# Patient Record
Sex: MALE | Race: WHITE | Employment: OTHER | URBAN - METROPOLITAN AREA
[De-identification: names, ages, dates, MRNs, and addresses within clinical notes are randomized per-mention and may not be internally consistent; named-entity substitution may affect disease eponyms.]

---

## 2018-03-26 ENCOUNTER — HOSPITAL ENCOUNTER (OUTPATIENT)
Age: 78
Setting detail: THERAPIES SERIES
Discharge: HOME | End: 2018-03-26
Payer: MEDICARE

## 2018-04-03 ENCOUNTER — HOSPITAL ENCOUNTER (OUTPATIENT)
Age: 78
Setting detail: THERAPIES SERIES
Discharge: HOME | End: 2018-04-03

## 2018-04-03 PROCEDURE — 9900000038 HC CARDIAC REHAB PHASE 3 - MONTHLY

## 2018-04-08 ENCOUNTER — APPOINTMENT (OUTPATIENT)
Dept: GENERAL RADIOLOGY | Age: 78
End: 2018-04-08
Payer: MEDICARE

## 2018-04-08 ENCOUNTER — HOSPITAL ENCOUNTER (EMERGENCY)
Age: 78
Discharge: HOME | End: 2018-04-08
Attending: EMERGENCY MEDICINE
Payer: MEDICARE

## 2018-04-08 VITALS
WEIGHT: 167 LBS | DIASTOLIC BLOOD PRESSURE: 63 MMHG | HEIGHT: 67 IN | SYSTOLIC BLOOD PRESSURE: 116 MMHG | OXYGEN SATURATION: 94 % | HEART RATE: 93 BPM | BODY MASS INDEX: 26.21 KG/M2 | RESPIRATION RATE: 22 BRPM | TEMPERATURE: 98.1 F

## 2018-04-08 DIAGNOSIS — J11.1 INFLUENZA WITH RESPIRATORY MANIFESTATION OTHER THAN PNEUMONIA: Primary | ICD-10-CM

## 2018-04-08 LAB
ALBUMIN SERPL-MCNC: 4.2 G/DL (ref 3.5–5.2)
ALP BLD-CCNC: 36 U/L (ref 40–129)
ALT SERPL-CCNC: 51 U/L (ref 0–40)
ANION GAP SERPL CALCULATED.3IONS-SCNC: 14 MMOL/L (ref 7–16)
AST SERPL-CCNC: 57 U/L (ref 0–39)
BASOPHILS ABSOLUTE: 0 E9/L (ref 0–0.2)
BASOPHILS RELATIVE PERCENT: 0 % (ref 0–2)
BILIRUB SERPL-MCNC: 0.5 MG/DL (ref 0–1.2)
BILIRUBIN URINE: NEGATIVE
BLOOD, URINE: NEGATIVE
BUN BLDV-MCNC: 21 MG/DL (ref 8–23)
CALCIUM SERPL-MCNC: 8.6 MG/DL (ref 8.6–10.2)
CHLORIDE BLD-SCNC: 100 MMOL/L (ref 98–107)
CLARITY: CLEAR
CO2: 21 MMOL/L (ref 22–29)
COLOR: YELLOW
CREAT SERPL-MCNC: 1.2 MG/DL (ref 0.7–1.2)
EOSINOPHILS ABSOLUTE: 0.01 E9/L (ref 0.05–0.5)
EOSINOPHILS RELATIVE PERCENT: 0.2 % (ref 0–6)
GFR AFRICAN AMERICAN: >60
GFR NON-AFRICAN AMERICAN: 59 ML/MIN/1.73
GLUCOSE BLD-MCNC: 176 MG/DL (ref 74–109)
GLUCOSE URINE: NEGATIVE MG/DL
HCT VFR BLD CALC: 42.2 % (ref 37–54)
HEMOGLOBIN: 13.8 G/DL (ref 12.5–16.5)
IMMATURE GRANULOCYTES #: 0.01 E9/L
IMMATURE GRANULOCYTES %: 0.2 % (ref 0–5)
INFLUENZA A BY PCR: DETECTED
INFLUENZA B BY PCR: NOT DETECTED
KETONES, URINE: NEGATIVE MG/DL
LACTIC ACID: 1.6 MMOL/L (ref 0.5–2.2)
LEUKOCYTE ESTERASE, URINE: NEGATIVE
LIPASE: 292 U/L (ref 13–60)
LYMPHOCYTES ABSOLUTE: 1.23 E9/L (ref 1.5–4)
LYMPHOCYTES RELATIVE PERCENT: 27 % (ref 20–42)
MCH RBC QN AUTO: 29.9 PG (ref 26–35)
MCHC RBC AUTO-ENTMCNC: 32.7 % (ref 32–34.5)
MCV RBC AUTO: 91.3 FL (ref 80–99.9)
MONOCYTES ABSOLUTE: 0.75 E9/L (ref 0.1–0.95)
MONOCYTES RELATIVE PERCENT: 16.5 % (ref 2–12)
NEUTROPHILS ABSOLUTE: 2.55 E9/L (ref 1.8–7.3)
NEUTROPHILS RELATIVE PERCENT: 56.1 % (ref 43–80)
NITRITE, URINE: NEGATIVE
PDW BLD-RTO: 14 FL (ref 11.5–15)
PH UA: 5 (ref 5–9)
PLATELET # BLD: 216 E9/L (ref 130–450)
PMV BLD AUTO: 9.5 FL (ref 7–12)
POTASSIUM SERPL-SCNC: 3.7 MMOL/L (ref 3.5–5)
PROTEIN UA: NEGATIVE MG/DL
RBC # BLD: 4.62 E12/L (ref 3.8–5.8)
SODIUM BLD-SCNC: 135 MMOL/L (ref 132–146)
SPECIFIC GRAVITY UA: >=1.03 (ref 1–1.03)
TOTAL PROTEIN: 6.7 G/DL (ref 6.4–8.3)
UROBILINOGEN, URINE: 0.2 E.U./DL
WBC # BLD: 4.6 E9/L (ref 4.5–11.5)

## 2018-04-08 PROCEDURE — 87040 BLOOD CULTURE FOR BACTERIA: CPT

## 2018-04-08 PROCEDURE — 94664 DEMO&/EVAL PT USE INHALER: CPT

## 2018-04-08 PROCEDURE — 81003 URINALYSIS AUTO W/O SCOPE: CPT

## 2018-04-08 PROCEDURE — 87088 URINE BACTERIA CULTURE: CPT

## 2018-04-08 PROCEDURE — 99285 EMERGENCY DEPT VISIT HI MDM: CPT

## 2018-04-08 PROCEDURE — 80053 COMPREHEN METABOLIC PANEL: CPT

## 2018-04-08 PROCEDURE — 83605 ASSAY OF LACTIC ACID: CPT

## 2018-04-08 PROCEDURE — 87502 INFLUENZA DNA AMP PROBE: CPT

## 2018-04-08 PROCEDURE — 71046 X-RAY EXAM CHEST 2 VIEWS: CPT

## 2018-04-08 PROCEDURE — 83690 ASSAY OF LIPASE: CPT

## 2018-04-08 PROCEDURE — 6370000000 HC RX 637 (ALT 250 FOR IP): Performed by: EMERGENCY MEDICINE

## 2018-04-08 PROCEDURE — 85025 COMPLETE CBC W/AUTO DIFF WBC: CPT

## 2018-04-08 RX ORDER — IPRATROPIUM BROMIDE AND ALBUTEROL SULFATE 2.5; .5 MG/3ML; MG/3ML
1 SOLUTION RESPIRATORY (INHALATION) ONCE
Status: COMPLETED | OUTPATIENT
Start: 2018-04-08 | End: 2018-04-08

## 2018-04-08 RX ORDER — OSELTAMIVIR PHOSPHATE 75 MG/1
75 CAPSULE ORAL 2 TIMES DAILY
Qty: 10 CAPSULE | Refills: 0 | Status: SHIPPED | OUTPATIENT
Start: 2018-04-08 | End: 2018-04-13

## 2018-04-08 RX ADMIN — IPRATROPIUM BROMIDE AND ALBUTEROL SULFATE 1 AMPULE: .5; 3 SOLUTION RESPIRATORY (INHALATION) at 07:41

## 2018-04-08 ASSESSMENT — PAIN DESCRIPTION - LOCATION: LOCATION: ABDOMEN;BACK

## 2018-04-08 ASSESSMENT — PAIN SCALES - GENERAL: PAINLEVEL_OUTOF10: 8

## 2018-04-08 ASSESSMENT — PAIN DESCRIPTION - PAIN TYPE: TYPE: ACUTE PAIN

## 2018-04-10 LAB — URINE CULTURE, ROUTINE: NORMAL

## 2018-04-13 LAB
BLOOD CULTURE, ROUTINE: NORMAL
CULTURE, BLOOD 2: NORMAL

## 2018-05-07 ENCOUNTER — HOSPITAL ENCOUNTER (OUTPATIENT)
Age: 78
Discharge: HOME | End: 2018-05-09
Payer: MEDICARE

## 2018-05-07 ENCOUNTER — HOSPITAL ENCOUNTER (OUTPATIENT)
Age: 78
Setting detail: THERAPIES SERIES
Discharge: HOME | End: 2018-05-07

## 2018-05-07 ENCOUNTER — OFFICE VISIT (OUTPATIENT)
Dept: PRIMARY CARE CLINIC | Age: 78
End: 2018-05-07
Payer: MEDICARE

## 2018-05-07 VITALS
HEIGHT: 67 IN | OXYGEN SATURATION: 98 % | HEART RATE: 65 BPM | BODY MASS INDEX: 26.68 KG/M2 | SYSTOLIC BLOOD PRESSURE: 120 MMHG | DIASTOLIC BLOOD PRESSURE: 72 MMHG | TEMPERATURE: 97.2 F | WEIGHT: 170 LBS

## 2018-05-07 DIAGNOSIS — I10 ESSENTIAL HYPERTENSION: ICD-10-CM

## 2018-05-07 DIAGNOSIS — E78.2 MIXED HYPERLIPIDEMIA: ICD-10-CM

## 2018-05-07 DIAGNOSIS — E11.9 TYPE 2 DIABETES MELLITUS WITHOUT COMPLICATION, WITHOUT LONG-TERM CURRENT USE OF INSULIN (HCC): ICD-10-CM

## 2018-05-07 DIAGNOSIS — Z12.5 SCREENING PSA (PROSTATE SPECIFIC ANTIGEN): Primary | ICD-10-CM

## 2018-05-07 DIAGNOSIS — K21.9 GASTROESOPHAGEAL REFLUX DISEASE, ESOPHAGITIS PRESENCE NOT SPECIFIED: ICD-10-CM

## 2018-05-07 DIAGNOSIS — Z12.5 SCREENING PSA (PROSTATE SPECIFIC ANTIGEN): ICD-10-CM

## 2018-05-07 DIAGNOSIS — Z12.11 ENCOUNTER FOR FIT (FECAL IMMUNOCHEMICAL TEST) SCREENING: ICD-10-CM

## 2018-05-07 LAB
ALBUMIN SERPL-MCNC: 4 G/DL (ref 3.5–5.2)
ALP BLD-CCNC: 30 U/L (ref 40–129)
ALT SERPL-CCNC: 21 U/L (ref 0–40)
ANION GAP SERPL CALCULATED.3IONS-SCNC: 15 MMOL/L (ref 7–16)
AST SERPL-CCNC: 25 U/L (ref 0–39)
BASOPHILS ABSOLUTE: 0.04 E9/L (ref 0–0.2)
BASOPHILS RELATIVE PERCENT: 0.6 % (ref 0–2)
BILIRUB SERPL-MCNC: 0.6 MG/DL (ref 0–1.2)
BUN BLDV-MCNC: 15 MG/DL (ref 8–23)
CALCIUM SERPL-MCNC: 8.9 MG/DL (ref 8.6–10.2)
CHLORIDE BLD-SCNC: 103 MMOL/L (ref 98–107)
CHOLESTEROL, TOTAL: 109 MG/DL (ref 0–199)
CO2: 22 MMOL/L (ref 22–29)
CREAT SERPL-MCNC: 1.1 MG/DL (ref 0.7–1.2)
EOSINOPHILS ABSOLUTE: 0.21 E9/L (ref 0.05–0.5)
EOSINOPHILS RELATIVE PERCENT: 3.4 % (ref 0–6)
GFR AFRICAN AMERICAN: >60
GFR NON-AFRICAN AMERICAN: >60 ML/MIN/1.73
GLUCOSE BLD-MCNC: 180 MG/DL (ref 74–109)
HBA1C MFR BLD: 7.8 % (ref 4.8–5.9)
HCT VFR BLD CALC: 41.4 % (ref 37–54)
HDLC SERPL-MCNC: 25 MG/DL
HEMOGLOBIN: 13.1 G/DL (ref 12.5–16.5)
IMMATURE GRANULOCYTES #: 0.02 E9/L
IMMATURE GRANULOCYTES %: 0.3 % (ref 0–5)
LDL CHOLESTEROL CALCULATED: 63 MG/DL (ref 0–99)
LYMPHOCYTES ABSOLUTE: 1.14 E9/L (ref 1.5–4)
LYMPHOCYTES RELATIVE PERCENT: 18.2 % (ref 20–42)
MCH RBC QN AUTO: 29.6 PG (ref 26–35)
MCHC RBC AUTO-ENTMCNC: 31.6 % (ref 32–34.5)
MCV RBC AUTO: 93.7 FL (ref 80–99.9)
MONOCYTES ABSOLUTE: 0.54 E9/L (ref 0.1–0.95)
MONOCYTES RELATIVE PERCENT: 8.6 % (ref 2–12)
NEUTROPHILS ABSOLUTE: 4.31 E9/L (ref 1.8–7.3)
NEUTROPHILS RELATIVE PERCENT: 68.9 % (ref 43–80)
PDW BLD-RTO: 14.5 FL (ref 11.5–15)
PLATELET # BLD: 360 E9/L (ref 130–450)
PMV BLD AUTO: 9.4 FL (ref 7–12)
POTASSIUM SERPL-SCNC: 4.8 MMOL/L (ref 3.5–5)
PROSTATE SPECIFIC ANTIGEN: 4.42 NG/ML (ref 0–4)
RBC # BLD: 4.42 E12/L (ref 3.8–5.8)
SODIUM BLD-SCNC: 140 MMOL/L (ref 132–146)
TOTAL PROTEIN: 6.6 G/DL (ref 6.4–8.3)
TRIGL SERPL-MCNC: 107 MG/DL (ref 0–149)
VLDLC SERPL CALC-MCNC: 21 MG/DL
WBC # BLD: 6.3 E9/L (ref 4.5–11.5)

## 2018-05-07 PROCEDURE — 85025 COMPLETE CBC W/AUTO DIFF WBC: CPT

## 2018-05-07 PROCEDURE — G8598 ASA/ANTIPLAT THER USED: HCPCS | Performed by: PHYSICIAN ASSISTANT

## 2018-05-07 PROCEDURE — G0103 PSA SCREENING: HCPCS

## 2018-05-07 PROCEDURE — G8427 DOCREV CUR MEDS BY ELIG CLIN: HCPCS | Performed by: PHYSICIAN ASSISTANT

## 2018-05-07 PROCEDURE — G8417 CALC BMI ABV UP PARAM F/U: HCPCS | Performed by: PHYSICIAN ASSISTANT

## 2018-05-07 PROCEDURE — 99214 OFFICE O/P EST MOD 30 MIN: CPT | Performed by: PHYSICIAN ASSISTANT

## 2018-05-07 PROCEDURE — 9900000038 HC CARDIAC REHAB PHASE 3 - MONTHLY

## 2018-05-07 PROCEDURE — 80061 LIPID PANEL: CPT

## 2018-05-07 PROCEDURE — 83036 HEMOGLOBIN GLYCOSYLATED A1C: CPT

## 2018-05-07 PROCEDURE — 80053 COMPREHEN METABOLIC PANEL: CPT

## 2018-05-07 PROCEDURE — 1123F ACP DISCUSS/DSCN MKR DOCD: CPT | Performed by: PHYSICIAN ASSISTANT

## 2018-05-07 PROCEDURE — 4040F PNEUMOC VAC/ADMIN/RCVD: CPT | Performed by: PHYSICIAN ASSISTANT

## 2018-05-07 PROCEDURE — 1036F TOBACCO NON-USER: CPT | Performed by: PHYSICIAN ASSISTANT

## 2018-05-08 DIAGNOSIS — R97.20 PSA ELEVATION: ICD-10-CM

## 2018-05-08 DIAGNOSIS — Z12.11 ENCOUNTER FOR FIT (FECAL IMMUNOCHEMICAL TEST) SCREENING: ICD-10-CM

## 2018-05-08 DIAGNOSIS — E11.9 TYPE 2 DIABETES MELLITUS WITHOUT COMPLICATION, WITHOUT LONG-TERM CURRENT USE OF INSULIN (HCC): Primary | ICD-10-CM

## 2018-05-08 LAB
CONTROL: ABNORMAL
HEMOCCULT STL QL: POSITIVE

## 2018-05-08 PROCEDURE — 82274 ASSAY TEST FOR BLOOD FECAL: CPT | Performed by: PHYSICIAN ASSISTANT

## 2018-05-09 DIAGNOSIS — R19.5 POSITIVE FIT (FECAL IMMUNOCHEMICAL TEST): Primary | ICD-10-CM

## 2018-06-04 ENCOUNTER — HOSPITAL ENCOUNTER (OUTPATIENT)
Age: 78
Discharge: HOME | End: 2018-06-04

## 2018-06-04 PROCEDURE — 9900000038 HC CARDIAC REHAB PHASE 3 - MONTHLY

## 2018-06-05 DIAGNOSIS — I10 ESSENTIAL HYPERTENSION: ICD-10-CM

## 2018-06-05 RX ORDER — LOSARTAN POTASSIUM 100 MG/1
100 TABLET ORAL DAILY
Qty: 90 TABLET | Refills: 3 | Status: SHIPPED | OUTPATIENT
Start: 2018-06-05 | End: 2019-03-06 | Stop reason: SDUPTHER

## 2018-06-07 ENCOUNTER — TELEPHONE (OUTPATIENT)
Dept: SURGERY | Age: 78
End: 2018-06-07

## 2018-06-08 ENCOUNTER — HOSPITAL ENCOUNTER (OUTPATIENT)
Age: 78
Discharge: HOME | End: 2018-06-10
Payer: MEDICARE

## 2018-06-08 ENCOUNTER — TELEPHONE (OUTPATIENT)
Dept: SURGERY | Age: 78
End: 2018-06-08

## 2018-06-08 LAB — PROSTATE SPECIFIC ANTIGEN: 3.95 NG/ML (ref 0–4)

## 2018-06-08 PROCEDURE — 84153 ASSAY OF PSA TOTAL: CPT

## 2018-06-19 ENCOUNTER — OFFICE VISIT (OUTPATIENT)
Dept: CARDIOLOGY CLINIC | Age: 78
End: 2018-06-19
Payer: MEDICARE

## 2018-06-19 VITALS
HEART RATE: 73 BPM | SYSTOLIC BLOOD PRESSURE: 136 MMHG | DIASTOLIC BLOOD PRESSURE: 78 MMHG | WEIGHT: 167.7 LBS | RESPIRATION RATE: 16 BRPM | HEIGHT: 67 IN | BODY MASS INDEX: 26.32 KG/M2

## 2018-06-19 DIAGNOSIS — I25.119 CORONARY ARTERY DISEASE INVOLVING NATIVE CORONARY ARTERY OF NATIVE HEART WITH ANGINA PECTORIS (HCC): Primary | ICD-10-CM

## 2018-06-19 PROCEDURE — 1123F ACP DISCUSS/DSCN MKR DOCD: CPT | Performed by: INTERNAL MEDICINE

## 2018-06-19 PROCEDURE — 99213 OFFICE O/P EST LOW 20 MIN: CPT | Performed by: INTERNAL MEDICINE

## 2018-06-19 PROCEDURE — 93000 ELECTROCARDIOGRAM COMPLETE: CPT | Performed by: INTERNAL MEDICINE

## 2018-06-19 PROCEDURE — G8598 ASA/ANTIPLAT THER USED: HCPCS | Performed by: INTERNAL MEDICINE

## 2018-06-19 PROCEDURE — 4040F PNEUMOC VAC/ADMIN/RCVD: CPT | Performed by: INTERNAL MEDICINE

## 2018-06-19 PROCEDURE — G8417 CALC BMI ABV UP PARAM F/U: HCPCS | Performed by: INTERNAL MEDICINE

## 2018-06-19 PROCEDURE — 1036F TOBACCO NON-USER: CPT | Performed by: INTERNAL MEDICINE

## 2018-06-19 PROCEDURE — G8427 DOCREV CUR MEDS BY ELIG CLIN: HCPCS | Performed by: INTERNAL MEDICINE

## 2018-07-02 ENCOUNTER — HOSPITAL ENCOUNTER (OUTPATIENT)
Age: 78
Discharge: HOME | End: 2018-07-02

## 2018-07-02 PROCEDURE — 9900000038 HC CARDIAC REHAB PHASE 3 - MONTHLY

## 2018-07-18 ENCOUNTER — OFFICE VISIT (OUTPATIENT)
Dept: SURGERY | Age: 78
End: 2018-07-18

## 2018-07-18 VITALS
DIASTOLIC BLOOD PRESSURE: 82 MMHG | OXYGEN SATURATION: 98 % | BODY MASS INDEX: 26.21 KG/M2 | HEIGHT: 67 IN | SYSTOLIC BLOOD PRESSURE: 128 MMHG | RESPIRATION RATE: 14 BRPM | WEIGHT: 167 LBS | HEART RATE: 88 BPM | TEMPERATURE: 97.9 F

## 2018-07-18 DIAGNOSIS — Z86.010 HX OF COLONIC POLYP: Primary | ICD-10-CM

## 2018-07-18 DIAGNOSIS — Z91.89 AT RISK FOR COLON CANCER: ICD-10-CM

## 2018-07-18 PROCEDURE — 99999 PR OFFICE/OUTPT VISIT,PROCEDURE ONLY: CPT | Performed by: SURGERY

## 2018-07-18 ASSESSMENT — ENCOUNTER SYMPTOMS
DIARRHEA: 0
BLOOD IN STOOL: 0
CONSTIPATION: 0
VOMITING: 0
EYES NEGATIVE: 1
NAUSEA: 0
COLOR CHANGE: 0
SHORTNESS OF BREATH: 0
BACK PAIN: 0
WHEEZING: 0
CHOKING: 0
COUGH: 0
ANAL BLEEDING: 0
ABDOMINAL PAIN: 0
ABDOMINAL DISTENTION: 0
CHEST TIGHTNESS: 0

## 2018-07-30 ENCOUNTER — APPOINTMENT (OUTPATIENT)
Dept: CT IMAGING | Age: 78
End: 2018-07-30
Payer: MEDICARE

## 2018-07-30 ENCOUNTER — APPOINTMENT (OUTPATIENT)
Dept: GENERAL RADIOLOGY | Age: 78
End: 2018-07-30
Payer: MEDICARE

## 2018-07-30 ENCOUNTER — HOSPITAL ENCOUNTER (EMERGENCY)
Age: 78
Discharge: ANOTHER HOSPITAL | End: 2018-07-31
Attending: EMERGENCY MEDICINE
Payer: MEDICARE

## 2018-07-30 DIAGNOSIS — A41.9 SEPSIS, DUE TO UNSPECIFIED ORGANISM: ICD-10-CM

## 2018-07-30 DIAGNOSIS — K86.89 PANCREATIC DUCT STONES: Primary | ICD-10-CM

## 2018-07-30 DIAGNOSIS — K85.80 OTHER ACUTE PANCREATITIS, UNSPECIFIED COMPLICATION STATUS: ICD-10-CM

## 2018-07-30 LAB
ALBUMIN SERPL-MCNC: 3.6 G/DL (ref 3.5–5.2)
ALP BLD-CCNC: 33 U/L (ref 40–129)
ALT SERPL-CCNC: 16 U/L (ref 0–40)
ANION GAP SERPL CALCULATED.3IONS-SCNC: 18 MMOL/L (ref 7–16)
AST SERPL-CCNC: 20 U/L (ref 0–39)
BACTERIA: ABNORMAL /HPF
BASOPHILS ABSOLUTE: 0.03 E9/L (ref 0–0.2)
BASOPHILS RELATIVE PERCENT: 0.2 % (ref 0–2)
BILIRUB SERPL-MCNC: 1.1 MG/DL (ref 0–1.2)
BILIRUBIN URINE: ABNORMAL
BLOOD, URINE: ABNORMAL
BUN BLDV-MCNC: 30 MG/DL (ref 8–23)
CALCIUM SERPL-MCNC: 7.4 MG/DL (ref 8.6–10.2)
CASTS: ABNORMAL /LPF
CHLORIDE BLD-SCNC: 103 MMOL/L (ref 98–107)
CLARITY: ABNORMAL
CO2: 17 MMOL/L (ref 22–29)
COLOR: YELLOW
CREAT SERPL-MCNC: 1.5 MG/DL (ref 0.7–1.2)
EOSINOPHILS ABSOLUTE: 0.02 E9/L (ref 0.05–0.5)
EOSINOPHILS RELATIVE PERCENT: 0.1 % (ref 0–6)
GFR AFRICAN AMERICAN: 55
GFR NON-AFRICAN AMERICAN: 45 ML/MIN/1.73
GLUCOSE BLD-MCNC: 182 MG/DL (ref 74–109)
GLUCOSE URINE: NEGATIVE MG/DL
HCT VFR BLD CALC: 45.4 % (ref 37–54)
HEMOGLOBIN: 14.9 G/DL (ref 12.5–16.5)
IMMATURE GRANULOCYTES #: 0.06 E9/L
IMMATURE GRANULOCYTES %: 0.4 % (ref 0–5)
KETONES, URINE: NEGATIVE MG/DL
LACTIC ACID: 2.3 MMOL/L (ref 0.5–2.2)
LEUKOCYTE ESTERASE, URINE: NEGATIVE
LIPASE: 155 U/L (ref 13–60)
LYMPHOCYTES ABSOLUTE: 0.79 E9/L (ref 1.5–4)
LYMPHOCYTES RELATIVE PERCENT: 5.8 % (ref 20–42)
MCH RBC QN AUTO: 29.3 PG (ref 26–35)
MCHC RBC AUTO-ENTMCNC: 32.8 % (ref 32–34.5)
MCV RBC AUTO: 89.2 FL (ref 80–99.9)
MONOCYTES ABSOLUTE: 0.86 E9/L (ref 0.1–0.95)
MONOCYTES RELATIVE PERCENT: 6.3 % (ref 2–12)
NEUTROPHILS ABSOLUTE: 11.92 E9/L (ref 1.8–7.3)
NEUTROPHILS RELATIVE PERCENT: 87.2 % (ref 43–80)
NITRITE, URINE: NEGATIVE
PDW BLD-RTO: 15.8 FL (ref 11.5–15)
PH UA: 5.5 (ref 5–9)
PLATELET # BLD: 262 E9/L (ref 130–450)
PMV BLD AUTO: 10 FL (ref 7–12)
POTASSIUM SERPL-SCNC: 4.1 MMOL/L (ref 3.5–5)
PROTEIN UA: 100 MG/DL
RBC # BLD: 5.09 E12/L (ref 3.8–5.8)
RBC UA: ABNORMAL /HPF (ref 0–2)
SODIUM BLD-SCNC: 138 MMOL/L (ref 132–146)
SPECIFIC GRAVITY UA: >=1.03 (ref 1–1.03)
TOTAL PROTEIN: 6.4 G/DL (ref 6.4–8.3)
UROBILINOGEN, URINE: 0.2 E.U./DL
WBC # BLD: 13.7 E9/L (ref 4.5–11.5)
WBC UA: ABNORMAL /HPF (ref 0–5)

## 2018-07-30 PROCEDURE — 81001 URINALYSIS AUTO W/SCOPE: CPT

## 2018-07-30 PROCEDURE — 96375 TX/PRO/DX INJ NEW DRUG ADDON: CPT

## 2018-07-30 PROCEDURE — 6360000002 HC RX W HCPCS: Performed by: EMERGENCY MEDICINE

## 2018-07-30 PROCEDURE — 99284 EMERGENCY DEPT VISIT MOD MDM: CPT

## 2018-07-30 PROCEDURE — 74176 CT ABD & PELVIS W/O CONTRAST: CPT

## 2018-07-30 PROCEDURE — 85025 COMPLETE CBC W/AUTO DIFF WBC: CPT

## 2018-07-30 PROCEDURE — 83605 ASSAY OF LACTIC ACID: CPT

## 2018-07-30 PROCEDURE — 74018 RADEX ABDOMEN 1 VIEW: CPT

## 2018-07-30 PROCEDURE — 2580000003 HC RX 258: Performed by: EMERGENCY MEDICINE

## 2018-07-30 PROCEDURE — 96374 THER/PROPH/DIAG INJ IV PUSH: CPT

## 2018-07-30 PROCEDURE — 83690 ASSAY OF LIPASE: CPT

## 2018-07-30 PROCEDURE — 80053 COMPREHEN METABOLIC PANEL: CPT

## 2018-07-30 RX ORDER — SODIUM CHLORIDE 9 MG/ML
INJECTION, SOLUTION INTRAVENOUS CONTINUOUS
Status: DISCONTINUED | OUTPATIENT
Start: 2018-07-30 | End: 2018-07-31 | Stop reason: HOSPADM

## 2018-07-30 RX ORDER — ONDANSETRON 2 MG/ML
4 INJECTION INTRAMUSCULAR; INTRAVENOUS ONCE
Status: COMPLETED | OUTPATIENT
Start: 2018-07-30 | End: 2018-07-30

## 2018-07-30 RX ORDER — 0.9 % SODIUM CHLORIDE 0.9 %
500 INTRAVENOUS SOLUTION INTRAVENOUS ONCE
Status: COMPLETED | OUTPATIENT
Start: 2018-07-30 | End: 2018-07-31

## 2018-07-30 RX ORDER — 0.9 % SODIUM CHLORIDE 0.9 %
1000 INTRAVENOUS SOLUTION INTRAVENOUS ONCE
Status: COMPLETED | OUTPATIENT
Start: 2018-07-30 | End: 2018-07-30

## 2018-07-30 RX ADMIN — HYDROMORPHONE HYDROCHLORIDE 0.5 MG: 1 INJECTION, SOLUTION INTRAMUSCULAR; INTRAVENOUS; SUBCUTANEOUS at 18:45

## 2018-07-30 RX ADMIN — SODIUM CHLORIDE 1000 ML: 9 INJECTION, SOLUTION INTRAVENOUS at 18:14

## 2018-07-30 RX ADMIN — SODIUM CHLORIDE 500 ML: 9 INJECTION, SOLUTION INTRAVENOUS at 22:41

## 2018-07-30 RX ADMIN — ONDANSETRON HYDROCHLORIDE 4 MG: 2 INJECTION, SOLUTION INTRAMUSCULAR; INTRAVENOUS at 18:45

## 2018-07-30 ASSESSMENT — PAIN DESCRIPTION - LOCATION
LOCATION: ABDOMEN
LOCATION: ABDOMEN

## 2018-07-30 ASSESSMENT — PAIN DESCRIPTION - PAIN TYPE
TYPE: ACUTE PAIN
TYPE: ACUTE PAIN

## 2018-07-30 ASSESSMENT — PAIN SCALES - GENERAL
PAINLEVEL_OUTOF10: 8
PAINLEVEL_OUTOF10: 10
PAINLEVEL_OUTOF10: 2

## 2018-07-30 ASSESSMENT — PAIN DESCRIPTION - PROGRESSION: CLINICAL_PROGRESSION: GRADUALLY IMPROVING

## 2018-07-30 ASSESSMENT — PAIN DESCRIPTION - FREQUENCY: FREQUENCY: CONTINUOUS

## 2018-07-30 NOTE — ED PROVIDER NOTES
and time. He appears well-developed and well-nourished. HENT:   Head: Normocephalic and atraumatic. Eyes: Conjunctivae are normal.   Neck: Normal range of motion. Neck supple. Cardiovascular: Normal rate, regular rhythm and normal heart sounds. No murmur heard. Pulmonary/Chest: Effort normal and breath sounds normal. No respiratory distress. He has no wheezes. He has no rales. Abdominal: Soft. Bowel sounds are normal. He exhibits distension. There is tenderness. There is no rebound and no guarding. Genitourinary:   Genitourinary Comments: Right-sided pain along the inguinal canal. No bulging cervical contusions were noted with bearing down. No varicocele was noted on palpation of the testicles. No transverse lie of the testicles. Cremasteric reflexes are intact bilaterally. Musculoskeletal: He exhibits no edema, tenderness or deformity. Neurological: He is alert and oriented to person, place, and time. No cranial nerve deficit. Coordination normal.   Skin: Skin is warm and dry. Nursing note and vitals reviewed. Procedures    MDM  Number of Diagnoses or Management Options  Other acute pancreatitis, unspecified complication status:   Pancreatic duct stones:   Sepsis, due to unspecified organism [facility]):   Diagnosis management comments: 81-year-old male presenting with nausea, vomiting, abdominal pain and distention starting on Sunday and progressively worsening. Patient was found to have pancreatic duct stones with dilatation of the pancreatic duct and what appears to be acute pancreatitis secondary to this. Patient was hemodynamically stable course of his emergency room stay and responded well to antiemetics and parenteral analgesia. Did the patient's stones, ERCPs indicated. However, we do not currently have ERCP capabilities of this facility the patient would benefit from transfer to a tertiary care center where ERCPs available.  Patient will be transferred to [facility] 7/30/2018  5:08 PM  ED Bed Assignment:  01/01    The nursing notes within the ED encounter and vital signs as below have been reviewed. Patient Vitals for the past 24 hrs:   BP Temp Temp src Pulse Resp SpO2 Height   07/30/18 2115 (!) 163/88 - - - - - -   07/30/18 2100 (!) 168/84 - - - - - -   07/30/18 2045 (!) 160/91 - - - - - -   07/30/18 2030 (!) 156/84 - - - - - -   07/30/18 2015 (!) 171/89 - - - - - -   07/30/18 1956 (!) 164/80 - - 94 14 98 % -   07/30/18 1716 (!) 166/93 98.2 °F (36.8 °C) Oral 99 18 96 % 5' 8\" (1.727 m)       Oxygen Saturation Interpretation: Normal      ------------------------------------------ PROGRESS NOTES ------------------------------------------  Re-evaluation(s):  Time: 1946. Patients symptoms has improved  Repeat physical examination is not changed  CT scan noted. There appear to be 2 stones in the pancreatic duct, saying some pink-red dilatation as well as pancreatitis. This was discussed with the patient. GI consult pending    1952: Discussed case with GI. Patient likely requires ERCP. However, there are no initial providers to provide this service here. Patient will require higher level care. Discusses patient recommended they be transferred to a tertiary facility. Per patient preference, [address] will be contacted for transfer and likely ERCP. Time: 0030. Patients symptoms are worsening  Repeat physical examination is not changed  Patient has recurring pain. Dilaudid has been redosed. Patient is pending transportation to [facility] [place]. I have spoken with the patient and discussed todays results, in addition to providing specific details for the plan of care and counseling regarding the diagnosis and prognosis. Their questions are answered at this time and they are agreeable with the plan. I have discussed the risks and benefits of transfer and they wish to proceed with the transfer.       --------------------------------- ADDITIONAL PROVIDER NOTES ---------------------------------  Consultations:  Spoke with Dr. Wali Tamayo (Med[facility], [facility]). Discussed case. They will admit this patient. .    Reason for transfer: Pancreatic stones w/ obstruction; pancreatitis. This patient's ED course included: a personal history and physicial examination, re-evaluation prior to disposition, multiple bedside re-evaluations, IV medications and complex medical decision making and emergency management    This patient has remained hemodynamically stable during their ED course. Clinical Impression  1. Pancreatic duct stones    2. Other acute pancreatitis, unspecified complication status    3. Sepsis, due to unspecified organism [facility])          Disposition  Patient's disposition: Transfer to [facility]. Transferred by: EMS. Patient's condition is stable.          Jordan Garrett  Resident  07/31/18 0127

## 2018-07-31 VITALS
SYSTOLIC BLOOD PRESSURE: 162 MMHG | RESPIRATION RATE: 16 BRPM | HEART RATE: 86 BPM | OXYGEN SATURATION: 94 % | TEMPERATURE: 98.5 F | DIASTOLIC BLOOD PRESSURE: 73 MMHG | HEIGHT: 68 IN

## 2018-07-31 PROCEDURE — 96376 TX/PRO/DX INJ SAME DRUG ADON: CPT

## 2018-07-31 PROCEDURE — 6360000002 HC RX W HCPCS: Performed by: EMERGENCY MEDICINE

## 2018-07-31 PROCEDURE — 2580000003 HC RX 258: Performed by: EMERGENCY MEDICINE

## 2018-07-31 RX ADMIN — SODIUM CHLORIDE: 9 INJECTION, SOLUTION INTRAVENOUS at 00:36

## 2018-07-31 RX ADMIN — HYDROMORPHONE HYDROCHLORIDE 0.5 MG: 1 INJECTION, SOLUTION INTRAMUSCULAR; INTRAVENOUS; SUBCUTANEOUS at 00:36

## 2018-07-31 ASSESSMENT — ENCOUNTER SYMPTOMS
SHORTNESS OF BREATH: 0
COUGH: 0
EYE DISCHARGE: 0
SINUS PRESSURE: 0
SORE THROAT: 0
VOMITING: 1
WHEEZING: 0
EYE REDNESS: 0
DIARRHEA: 0
EYE PAIN: 0
BACK PAIN: 0
ABDOMINAL PAIN: 1
NAUSEA: 1

## 2018-07-31 ASSESSMENT — PAIN SCALES - GENERAL: PAINLEVEL_OUTOF10: 3

## 2018-07-31 NOTE — PROGRESS NOTES
2158 Access center called pt going to [facility] room 706 accepting DrReny Is Dr. Juan Collazo, nurse to nurse [phone]

## 2018-07-31 NOTE — PROGRESS NOTES
2209 Access center called and said their ETA was 2hrs. approx 0009. At approx 0100 we called [facility] and were told that it would be another 90min. Approx. 0130.  0235 called community [facility] and was told that someone said patient was already gone. Don't know where they got this info patient was never off our board. [facility] will be here now at approx. [phone] [facility] [place] [facility]

## 2018-07-31 NOTE — ED NOTES
Nurse to nurse report called to [facility]. [phone]  Pt going to room 706.           Adrianna Calhoun RN  07/30/18 8665

## 2018-08-01 ENCOUNTER — HOSPITAL ENCOUNTER (OUTPATIENT)
Age: 78
Discharge: HOME | End: 2018-08-01

## 2018-08-06 ENCOUNTER — HOSPITAL ENCOUNTER (OUTPATIENT)
Age: 78
Discharge: HOME | End: 2018-08-08
Payer: MEDICARE

## 2018-08-06 ENCOUNTER — OFFICE VISIT (OUTPATIENT)
Dept: PRIMARY CARE CLINIC | Age: 78
End: 2018-08-06
Payer: MEDICARE

## 2018-08-06 VITALS
SYSTOLIC BLOOD PRESSURE: 142 MMHG | HEART RATE: 91 BPM | TEMPERATURE: 98.1 F | WEIGHT: 166 LBS | BODY MASS INDEX: 25.24 KG/M2 | OXYGEN SATURATION: 97 % | DIASTOLIC BLOOD PRESSURE: 86 MMHG

## 2018-08-06 DIAGNOSIS — K85.90 ACUTE ON CHRONIC PANCREATITIS (HCC): ICD-10-CM

## 2018-08-06 DIAGNOSIS — K86.1 ACUTE ON CHRONIC PANCREATITIS (HCC): ICD-10-CM

## 2018-08-06 DIAGNOSIS — K86.1 CHRONIC PANCREATITIS, UNSPECIFIED PANCREATITIS TYPE (HCC): ICD-10-CM

## 2018-08-06 DIAGNOSIS — K86.89 PANCREATIC DUCT STONES: ICD-10-CM

## 2018-08-06 DIAGNOSIS — E11.9 TYPE 2 DIABETES MELLITUS WITHOUT COMPLICATION, WITHOUT LONG-TERM CURRENT USE OF INSULIN (HCC): ICD-10-CM

## 2018-08-06 DIAGNOSIS — K86.89 PANCREATIC DUCT STONES: Primary | ICD-10-CM

## 2018-08-06 LAB
ALBUMIN SERPL-MCNC: 3 G/DL (ref 3.5–5.2)
ALP BLD-CCNC: 65 U/L (ref 40–129)
ALT SERPL-CCNC: 27 U/L (ref 0–40)
ANION GAP SERPL CALCULATED.3IONS-SCNC: 21 MMOL/L (ref 7–16)
AST SERPL-CCNC: 29 U/L (ref 0–39)
BASOPHILS ABSOLUTE: 0.02 E9/L (ref 0–0.2)
BASOPHILS RELATIVE PERCENT: 0.2 % (ref 0–2)
BILIRUB SERPL-MCNC: 1 MG/DL (ref 0–1.2)
BUN BLDV-MCNC: 20 MG/DL (ref 8–23)
CALCIUM SERPL-MCNC: 8.9 MG/DL (ref 8.6–10.2)
CHLORIDE BLD-SCNC: 100 MMOL/L (ref 98–107)
CO2: 19 MMOL/L (ref 22–29)
CREAT SERPL-MCNC: 1 MG/DL (ref 0.7–1.2)
EOSINOPHILS ABSOLUTE: 0.11 E9/L (ref 0.05–0.5)
EOSINOPHILS RELATIVE PERCENT: 0.9 % (ref 0–6)
GFR AFRICAN AMERICAN: >60
GFR NON-AFRICAN AMERICAN: >60 ML/MIN/1.73
GLUCOSE BLD-MCNC: 145 MG/DL
GLUCOSE BLD-MCNC: 152 MG/DL (ref 74–109)
HBA1C MFR BLD: 7.1 % (ref 4–5.6)
HCT VFR BLD CALC: 38.6 % (ref 37–54)
HEMOGLOBIN: 12.6 G/DL (ref 12.5–16.5)
IMMATURE GRANULOCYTES #: 0.08 E9/L
IMMATURE GRANULOCYTES %: 0.7 % (ref 0–5)
LIPASE: 112 U/L (ref 13–60)
LYMPHOCYTES ABSOLUTE: 0.79 E9/L (ref 1.5–4)
LYMPHOCYTES RELATIVE PERCENT: 6.7 % (ref 20–42)
MCH RBC QN AUTO: 29.6 PG (ref 26–35)
MCHC RBC AUTO-ENTMCNC: 32.6 % (ref 32–34.5)
MCV RBC AUTO: 90.8 FL (ref 80–99.9)
MONOCYTES ABSOLUTE: 0.73 E9/L (ref 0.1–0.95)
MONOCYTES RELATIVE PERCENT: 6.2 % (ref 2–12)
NEUTROPHILS ABSOLUTE: 10.11 E9/L (ref 1.8–7.3)
NEUTROPHILS RELATIVE PERCENT: 85.3 % (ref 43–80)
PDW BLD-RTO: 15.9 FL (ref 11.5–15)
PLATELET # BLD: 391 E9/L (ref 130–450)
PMV BLD AUTO: 10.2 FL (ref 7–12)
POTASSIUM SERPL-SCNC: 4.3 MMOL/L (ref 3.5–5)
RBC # BLD: 4.25 E12/L (ref 3.8–5.8)
SODIUM BLD-SCNC: 140 MMOL/L (ref 132–146)
TOTAL PROTEIN: 6.2 G/DL (ref 6.4–8.3)
WBC # BLD: 11.8 E9/L (ref 4.5–11.5)

## 2018-08-06 PROCEDURE — G8417 CALC BMI ABV UP PARAM F/U: HCPCS | Performed by: PHYSICIAN ASSISTANT

## 2018-08-06 PROCEDURE — G8598 ASA/ANTIPLAT THER USED: HCPCS | Performed by: PHYSICIAN ASSISTANT

## 2018-08-06 PROCEDURE — 1111F DSCHRG MED/CURRENT MED MERGE: CPT | Performed by: PHYSICIAN ASSISTANT

## 2018-08-06 PROCEDURE — 80053 COMPREHEN METABOLIC PANEL: CPT

## 2018-08-06 PROCEDURE — 83036 HEMOGLOBIN GLYCOSYLATED A1C: CPT

## 2018-08-06 PROCEDURE — 85025 COMPLETE CBC W/AUTO DIFF WBC: CPT

## 2018-08-06 PROCEDURE — 99214 OFFICE O/P EST MOD 30 MIN: CPT | Performed by: PHYSICIAN ASSISTANT

## 2018-08-06 PROCEDURE — G8427 DOCREV CUR MEDS BY ELIG CLIN: HCPCS | Performed by: PHYSICIAN ASSISTANT

## 2018-08-06 PROCEDURE — 1036F TOBACCO NON-USER: CPT | Performed by: PHYSICIAN ASSISTANT

## 2018-08-06 PROCEDURE — 83690 ASSAY OF LIPASE: CPT

## 2018-08-06 PROCEDURE — 1101F PT FALLS ASSESS-DOCD LE1/YR: CPT | Performed by: PHYSICIAN ASSISTANT

## 2018-08-06 PROCEDURE — 1123F ACP DISCUSS/DSCN MKR DOCD: CPT | Performed by: PHYSICIAN ASSISTANT

## 2018-08-06 PROCEDURE — 82962 GLUCOSE BLOOD TEST: CPT | Performed by: PHYSICIAN ASSISTANT

## 2018-08-06 PROCEDURE — 4040F PNEUMOC VAC/ADMIN/RCVD: CPT | Performed by: PHYSICIAN ASSISTANT

## 2018-08-10 ENCOUNTER — OFFICE VISIT (OUTPATIENT)
Dept: HEMATOLOGY | Age: 78
End: 2018-08-10
Payer: MEDICARE

## 2018-08-10 VITALS
SYSTOLIC BLOOD PRESSURE: 141 MMHG | BODY MASS INDEX: 24.63 KG/M2 | DIASTOLIC BLOOD PRESSURE: 75 MMHG | HEART RATE: 87 BPM | HEIGHT: 67 IN | WEIGHT: 156.9 LBS | TEMPERATURE: 97.6 F | OXYGEN SATURATION: 98 %

## 2018-08-10 DIAGNOSIS — K80.10 CALCULUS OF GALLBLADDER WITH CHRONIC CHOLECYSTITIS WITHOUT OBSTRUCTION: ICD-10-CM

## 2018-08-10 DIAGNOSIS — K86.1 CHRONIC CALCIFIC PANCREATITIS (HCC): Primary | ICD-10-CM

## 2018-08-10 PROCEDURE — G8427 DOCREV CUR MEDS BY ELIG CLIN: HCPCS | Performed by: TRANSPLANT SURGERY

## 2018-08-10 PROCEDURE — 1123F ACP DISCUSS/DSCN MKR DOCD: CPT | Performed by: TRANSPLANT SURGERY

## 2018-08-10 PROCEDURE — G8420 CALC BMI NORM PARAMETERS: HCPCS | Performed by: TRANSPLANT SURGERY

## 2018-08-10 PROCEDURE — G8598 ASA/ANTIPLAT THER USED: HCPCS | Performed by: TRANSPLANT SURGERY

## 2018-08-10 PROCEDURE — 1036F TOBACCO NON-USER: CPT | Performed by: TRANSPLANT SURGERY

## 2018-08-10 PROCEDURE — 99205 OFFICE O/P NEW HI 60 MIN: CPT | Performed by: TRANSPLANT SURGERY

## 2018-08-10 PROCEDURE — 4040F PNEUMOC VAC/ADMIN/RCVD: CPT | Performed by: TRANSPLANT SURGERY

## 2018-08-10 PROCEDURE — 1101F PT FALLS ASSESS-DOCD LE1/YR: CPT | Performed by: TRANSPLANT SURGERY

## 2018-08-10 ASSESSMENT — ENCOUNTER SYMPTOMS
SHORTNESS OF BREATH: 0
ABDOMINAL PAIN: 1
CONSTIPATION: 0
HEMOPTYSIS: 0
BLOOD IN STOOL: 0
HEARTBURN: 0
VOMITING: 0
EYE DISCHARGE: 0
NAUSEA: 0
PHOTOPHOBIA: 0
SPUTUM PRODUCTION: 0
ORTHOPNEA: 0
EYE PAIN: 0
DOUBLE VISION: 0
DIARRHEA: 0
BACK PAIN: 0

## 2018-08-13 ENCOUNTER — TELEPHONE (OUTPATIENT)
Dept: HEMATOLOGY | Age: 78
End: 2018-08-13

## 2018-08-13 DIAGNOSIS — K85.00 IDIOPATHIC ACUTE PANCREATITIS WITHOUT INFECTION OR NECROSIS: Primary | ICD-10-CM

## 2018-08-13 NOTE — TELEPHONE ENCOUNTER
CALLED PATIENTS [employer] THE VOICE AUTOMATED MACHINE STATED THAT NO PRIOR AUTH IS NEEDED SINCE THIS IS THE PATIENTS MEDICARE SUPPLEMENT. I THEN CALLED SCHEDULING AND SPOKE WITH PRITESH. WE SCHEDULED THE PATIENT FOR 08/15/18 AT [facility]. PT TO ARRIVE AT 7:30 AM AND TEST TO START AT 8AM. PT TO BE NPO (NOTHING TO EAT OR DRINK) STARTING AT 4 AM. OK FOR PATIENT TO TAKE MEDS WITH SMALL AMOUNT OF WATER. PT IS AWARE HE HAS TO HOLD HIS METFORMIN THE DAY OF HIS CT SCAN AND TWO DAYS AFTER. PT KNOWS WHERE TO PARK AND WHERE TO REGISTER. HE VERBALLY UNDERSTOOD.  HE IS ALSO AWARE HE HAS A FOLLOW UP APPT WITH DR Dieter [address] ON 08/16/18 AT 2:30  Electronically signed by Clifton Dyson MA on 8/13/2018 at 1:39 PM

## 2018-08-15 ENCOUNTER — TELEPHONE (OUTPATIENT)
Dept: HEMATOLOGY | Age: 78
End: 2018-08-15

## 2018-08-15 ENCOUNTER — HOSPITAL ENCOUNTER (OUTPATIENT)
Dept: CT IMAGING | Age: 78
Discharge: HOME | End: 2018-08-17
Payer: MEDICARE

## 2018-08-15 DIAGNOSIS — K85.00 IDIOPATHIC ACUTE PANCREATITIS WITHOUT INFECTION OR NECROSIS: ICD-10-CM

## 2018-08-15 PROCEDURE — 6360000004 HC RX CONTRAST MEDICATION: Performed by: RADIOLOGY

## 2018-08-15 PROCEDURE — 74170 CT ABD WO CNTRST FLWD CNTRST: CPT

## 2018-08-15 PROCEDURE — 2580000003 HC RX 258: Performed by: RADIOLOGY

## 2018-08-15 RX ORDER — SODIUM CHLORIDE 0.9 % (FLUSH) 0.9 %
10 SYRINGE (ML) INJECTION
Status: COMPLETED | OUTPATIENT
Start: 2018-08-15 | End: 2018-08-15

## 2018-08-15 RX ADMIN — IOPAMIDOL 100 ML: 755 INJECTION, SOLUTION INTRAVENOUS at 07:50

## 2018-08-15 RX ADMIN — Medication 10 ML: at 07:50

## 2018-08-15 NOTE — TELEPHONE ENCOUNTER
CALLED AND LEFT MESSAGE ON PATIENTS [employer] TO REMIND HIM OF HIS APPT WITH DR Keyonna Arthur ON 08/16/18 AT 2:30 PM  Electronically signed by Wali Parker MA on 8/15/2018 at 8:33 AM

## 2018-08-16 ENCOUNTER — OFFICE VISIT (OUTPATIENT)
Dept: HEMATOLOGY | Age: 78
End: 2018-08-16
Payer: MEDICARE

## 2018-08-16 VITALS
BODY MASS INDEX: 24.11 KG/M2 | OXYGEN SATURATION: 97 % | WEIGHT: 153.6 LBS | SYSTOLIC BLOOD PRESSURE: 127 MMHG | TEMPERATURE: 98.7 F | DIASTOLIC BLOOD PRESSURE: 86 MMHG | HEART RATE: 75 BPM | HEIGHT: 67 IN

## 2018-08-16 DIAGNOSIS — K85.90 ACUTE ON CHRONIC PANCREATITIS (HCC): Primary | ICD-10-CM

## 2018-08-16 DIAGNOSIS — K86.1 ACUTE ON CHRONIC PANCREATITIS (HCC): Primary | ICD-10-CM

## 2018-08-16 PROCEDURE — 1123F ACP DISCUSS/DSCN MKR DOCD: CPT | Performed by: TRANSPLANT SURGERY

## 2018-08-16 PROCEDURE — 4040F PNEUMOC VAC/ADMIN/RCVD: CPT | Performed by: TRANSPLANT SURGERY

## 2018-08-16 PROCEDURE — 1101F PT FALLS ASSESS-DOCD LE1/YR: CPT | Performed by: TRANSPLANT SURGERY

## 2018-08-16 PROCEDURE — 99214 OFFICE O/P EST MOD 30 MIN: CPT | Performed by: TRANSPLANT SURGERY

## 2018-08-16 PROCEDURE — G8427 DOCREV CUR MEDS BY ELIG CLIN: HCPCS | Performed by: TRANSPLANT SURGERY

## 2018-08-16 PROCEDURE — G8598 ASA/ANTIPLAT THER USED: HCPCS | Performed by: TRANSPLANT SURGERY

## 2018-08-16 PROCEDURE — 1036F TOBACCO NON-USER: CPT | Performed by: TRANSPLANT SURGERY

## 2018-08-16 PROCEDURE — G8420 CALC BMI NORM PARAMETERS: HCPCS | Performed by: TRANSPLANT SURGERY

## 2018-08-17 ENCOUNTER — TELEPHONE (OUTPATIENT)
Dept: HEMATOLOGY | Age: 78
End: 2018-08-17

## 2018-08-24 RX ORDER — RANITIDINE 150 MG/1
150 TABLET ORAL NIGHTLY
COMMUNITY
End: 2021-05-25 | Stop reason: ALTCHOICE

## 2018-08-24 NOTE — PROGRESS NOTES
you can expect a call from registration    [x] You can expect a call the business day prior to procedure to notify you if your arrival time changes    [x] If you receive a survey after surgery we would greatly appreciate your comments    [] Parent/guardian of a minor must accompany their child and remain on the premises  the entire time they are under our care     [] Pediatric patients may bring favorite toy, blanket or comfort item with them    [] A caregiver or family member must remain with the patient during their stay if they are mentally handicapped, have dementia, disoriented or unable to use a call light or would be a safety concern if left unattended    [x] Please notify surgeon if you develop any illness between now and time of surgery (cold, cough, sore throat, fever, nausea, vomiting) or any signs of infections  including skin, wounds, and dental.    [] Other instructions    EDUCATIONAL MATERIALS PROVIDED:    [] PAT Preoperative Education Packet/Booklet     [] Medication List    [] Fluoroscopy Information Pamphlet    [] Transfusion bracelet applied with instructions    [] Joint replacement video reviewed    [] Shower with soap, lather and rinse well, and use CHG wipes provided the evening before surgery as instructed

## 2018-09-05 NOTE — H&P
Hepatobiliary and Pancreatic Surgery Progress Note     CC: acute necrotic collection secondary to acute on chronic pancreatitis     Subjective: Patient doing better states that his pain has resolved and he overall is feeling better. He hada triphasic CT scan performed. He had an unsuccessful ERCP secondary to an edematous papilla.       OBJECTIVE       Physical    VITALS:  /86 (Site: Right Arm, Position: Sitting, Cuff Size: Small Adult)   Pulse 75   Temp 98.7 °F (37.1 °C) (Oral)   Ht 5' 7\" (1.702 m)   Wt 153 lb 9.6 oz (69.7 kg)   SpO2 97%   BMI 24.06 kg/m²      General appearance: appears in no acute distress  Lungs:CTABL  Heart: RRR  Abdomen: soft, nondistended, nontympanic, no guarding, no peritoneal signs, normoactive bowel sounds  Extremities:no peripheral edema     ASSESSMENT: Acute necrotic collection s/p acute on chronic calcific pancreatitis     PLAN:    - we reviewed his images together today in the office  - he has a very large acute necrotic fluid collection in his pancreatic head. No vascular complications  - will plan to remove his gallbladder  - Informed Consent: Patient and family were made aware of the risks, benefits, alternatives, complications, and expected outcomes of a laparoscopic robotic cholecystectomy possible open possible cholangiogram and wishes to proceed with surgery. - he does have a pancreatic duct stone as well as a CBD stone which will require an ERCP at some point     25 Minutes of which greater than 50% was spent counseling or coordinating his care.     Thank you for the consultation and allowing me to take part in Mr. Resendez's care.        Alayna Urrutia 8/16/2018 8:31 PM

## 2018-09-06 ENCOUNTER — HOSPITAL ENCOUNTER (OUTPATIENT)
Age: 78
Setting detail: OUTPATIENT SURGERY
Discharge: HOME | End: 2018-09-06
Attending: TRANSPLANT SURGERY | Admitting: TRANSPLANT SURGERY
Payer: MEDICARE

## 2018-09-06 ENCOUNTER — ANESTHESIA EVENT (OUTPATIENT)
Dept: OPERATING ROOM | Age: 78
End: 2018-09-06
Payer: MEDICARE

## 2018-09-06 ENCOUNTER — ANESTHESIA (OUTPATIENT)
Dept: OPERATING ROOM | Age: 78
End: 2018-09-06
Payer: MEDICARE

## 2018-09-06 VITALS
BODY MASS INDEX: 23.86 KG/M2 | HEIGHT: 67 IN | OXYGEN SATURATION: 98 % | RESPIRATION RATE: 16 BRPM | HEART RATE: 58 BPM | WEIGHT: 152 LBS | DIASTOLIC BLOOD PRESSURE: 72 MMHG | TEMPERATURE: 98 F | SYSTOLIC BLOOD PRESSURE: 155 MMHG

## 2018-09-06 VITALS
DIASTOLIC BLOOD PRESSURE: 73 MMHG | SYSTOLIC BLOOD PRESSURE: 153 MMHG | TEMPERATURE: 96.6 F | OXYGEN SATURATION: 100 % | RESPIRATION RATE: 4 BRPM

## 2018-09-06 DIAGNOSIS — K85.10 GALLSTONE PANCREATITIS: Primary | ICD-10-CM

## 2018-09-06 LAB
ANION GAP SERPL CALCULATED.3IONS-SCNC: 12 MMOL/L (ref 7–16)
BUN BLDV-MCNC: 30 MG/DL (ref 8–23)
CALCIUM SERPL-MCNC: 8.8 MG/DL (ref 8.6–10.2)
CHLORIDE BLD-SCNC: 104 MMOL/L (ref 98–107)
CO2: 22 MMOL/L (ref 22–29)
CREAT SERPL-MCNC: 1.1 MG/DL (ref 0.7–1.2)
GFR AFRICAN AMERICAN: >60
GFR NON-AFRICAN AMERICAN: >60 ML/MIN/1.73
GLUCOSE BLD-MCNC: 129 MG/DL (ref 74–109)
HCT VFR BLD CALC: 39 % (ref 37–54)
HEMOGLOBIN: 12.8 G/DL (ref 12.5–16.5)
MCH RBC QN AUTO: 30.1 PG (ref 26–35)
MCHC RBC AUTO-ENTMCNC: 32.8 % (ref 32–34.5)
MCV RBC AUTO: 91.8 FL (ref 80–99.9)
METER GLUCOSE: 131 MG/DL (ref 70–110)
PDW BLD-RTO: 15.2 FL (ref 11.5–15)
PLATELET # BLD: 239 E9/L (ref 130–450)
PMV BLD AUTO: 9.4 FL (ref 7–12)
POTASSIUM SERPL-SCNC: 4.2 MMOL/L (ref 3.5–5)
RBC # BLD: 4.25 E12/L (ref 3.8–5.8)
SODIUM BLD-SCNC: 138 MMOL/L (ref 132–146)
WBC # BLD: 4.2 E9/L (ref 4.5–11.5)

## 2018-09-06 PROCEDURE — 47562 LAPAROSCOPIC CHOLECYSTECTOMY: CPT | Performed by: TRANSPLANT SURGERY

## 2018-09-06 PROCEDURE — 2500000003 HC RX 250 WO HCPCS: Performed by: NURSE ANESTHETIST, CERTIFIED REGISTERED

## 2018-09-06 PROCEDURE — 3700000000 HC ANESTHESIA ATTENDED CARE: Performed by: TRANSPLANT SURGERY

## 2018-09-06 PROCEDURE — 6370000000 HC RX 637 (ALT 250 FOR IP): Performed by: ANESTHESIOLOGY

## 2018-09-06 PROCEDURE — S2900 ROBOTIC SURGICAL SYSTEM: HCPCS | Performed by: TRANSPLANT SURGERY

## 2018-09-06 PROCEDURE — 2580000003 HC RX 258: Performed by: NURSE ANESTHETIST, CERTIFIED REGISTERED

## 2018-09-06 PROCEDURE — 88304 TISSUE EXAM BY PATHOLOGIST: CPT

## 2018-09-06 PROCEDURE — 80048 BASIC METABOLIC PNL TOTAL CA: CPT

## 2018-09-06 PROCEDURE — 85027 COMPLETE CBC AUTOMATED: CPT

## 2018-09-06 PROCEDURE — 82962 GLUCOSE BLOOD TEST: CPT

## 2018-09-06 PROCEDURE — 3700000001 HC ADD 15 MINUTES (ANESTHESIA): Performed by: TRANSPLANT SURGERY

## 2018-09-06 PROCEDURE — 7100000010 HC PHASE II RECOVERY - FIRST 15 MIN: Performed by: TRANSPLANT SURGERY

## 2018-09-06 PROCEDURE — 7100000000 HC PACU RECOVERY - FIRST 15 MIN: Performed by: TRANSPLANT SURGERY

## 2018-09-06 PROCEDURE — 2709999900 HC NON-CHARGEABLE SUPPLY: Performed by: TRANSPLANT SURGERY

## 2018-09-06 PROCEDURE — 7100000011 HC PHASE II RECOVERY - ADDTL 15 MIN: Performed by: TRANSPLANT SURGERY

## 2018-09-06 PROCEDURE — 36415 COLL VENOUS BLD VENIPUNCTURE: CPT

## 2018-09-06 PROCEDURE — 2500000003 HC RX 250 WO HCPCS: Performed by: TRANSPLANT SURGERY

## 2018-09-06 PROCEDURE — 3600000009 HC SURGERY ROBOT BASE: Performed by: TRANSPLANT SURGERY

## 2018-09-06 PROCEDURE — 3600000019 HC SURGERY ROBOT ADDTL 15MIN: Performed by: TRANSPLANT SURGERY

## 2018-09-06 PROCEDURE — 6360000002 HC RX W HCPCS: Performed by: TRANSPLANT SURGERY

## 2018-09-06 PROCEDURE — 2780000010 HC IMPLANT OTHER: Performed by: TRANSPLANT SURGERY

## 2018-09-06 PROCEDURE — C1773 RET DEV, INSERTABLE: HCPCS | Performed by: TRANSPLANT SURGERY

## 2018-09-06 PROCEDURE — 7100000001 HC PACU RECOVERY - ADDTL 15 MIN: Performed by: TRANSPLANT SURGERY

## 2018-09-06 PROCEDURE — 6360000002 HC RX W HCPCS: Performed by: NURSE ANESTHETIST, CERTIFIED REGISTERED

## 2018-09-06 RX ORDER — ONDANSETRON 2 MG/ML
4 INJECTION INTRAMUSCULAR; INTRAVENOUS
Status: DISCONTINUED | OUTPATIENT
Start: 2018-09-06 | End: 2018-09-06 | Stop reason: HOSPADM

## 2018-09-06 RX ORDER — AMOXICILLIN 250 MG
2 CAPSULE ORAL 2 TIMES DAILY
Qty: 28 TABLET | Refills: 0 | Status: SHIPPED | OUTPATIENT
Start: 2018-09-06 | End: 2018-09-13

## 2018-09-06 RX ORDER — OXYCODONE HYDROCHLORIDE 5 MG/1
5 TABLET ORAL ONCE
Status: COMPLETED | OUTPATIENT
Start: 2018-09-06 | End: 2018-09-06

## 2018-09-06 RX ORDER — PROPOFOL 10 MG/ML
INJECTION, EMULSION INTRAVENOUS CONTINUOUS PRN
Status: DISCONTINUED | OUTPATIENT
Start: 2018-09-06 | End: 2018-09-06

## 2018-09-06 RX ORDER — BUPIVACAINE HYDROCHLORIDE 5 MG/ML
INJECTION, SOLUTION EPIDURAL; INTRACAUDAL PRN
Status: DISCONTINUED | OUTPATIENT
Start: 2018-09-06 | End: 2018-09-06 | Stop reason: HOSPADM

## 2018-09-06 RX ORDER — OXYCODONE HYDROCHLORIDE 5 MG/1
5 TABLET ORAL EVERY 4 HOURS PRN
Qty: 56 TABLET | Refills: 0 | Status: SHIPPED | OUTPATIENT
Start: 2018-09-06 | End: 2018-09-13 | Stop reason: CLARIF

## 2018-09-06 RX ORDER — IBUPROFEN 800 MG/1
800 TABLET ORAL EVERY 8 HOURS
Qty: 120 TABLET | Refills: 3 | Status: SHIPPED | OUTPATIENT
Start: 2018-09-06 | End: 2019-05-30

## 2018-09-06 RX ORDER — PROPOFOL 10 MG/ML
INJECTION, EMULSION INTRAVENOUS PRN
Status: DISCONTINUED | OUTPATIENT
Start: 2018-09-06 | End: 2018-09-06 | Stop reason: SDUPTHER

## 2018-09-06 RX ORDER — SODIUM CHLORIDE 9 MG/ML
INJECTION, SOLUTION INTRAVENOUS CONTINUOUS PRN
Status: DISCONTINUED | OUTPATIENT
Start: 2018-09-06 | End: 2018-09-06 | Stop reason: SDUPTHER

## 2018-09-06 RX ORDER — SODIUM CHLORIDE 0.9 % (FLUSH) 0.9 %
10 SYRINGE (ML) INJECTION EVERY 12 HOURS SCHEDULED
Status: DISCONTINUED | OUTPATIENT
Start: 2018-09-06 | End: 2018-09-06 | Stop reason: HOSPADM

## 2018-09-06 RX ORDER — ROCURONIUM BROMIDE 10 MG/ML
INJECTION, SOLUTION INTRAVENOUS PRN
Status: DISCONTINUED | OUTPATIENT
Start: 2018-09-06 | End: 2018-09-06 | Stop reason: SDUPTHER

## 2018-09-06 RX ORDER — ACETAMINOPHEN 500 MG
500 TABLET ORAL EVERY 6 HOURS
Qty: 120 TABLET | Refills: 3 | Status: SHIPPED | OUTPATIENT
Start: 2018-09-06 | End: 2018-12-21 | Stop reason: ALTCHOICE

## 2018-09-06 RX ORDER — GLYCOPYRROLATE 0.2 MG/ML
INJECTION INTRAMUSCULAR; INTRAVENOUS PRN
Status: DISCONTINUED | OUTPATIENT
Start: 2018-09-06 | End: 2018-09-06 | Stop reason: SDUPTHER

## 2018-09-06 RX ORDER — ONDANSETRON 2 MG/ML
INJECTION INTRAMUSCULAR; INTRAVENOUS PRN
Status: DISCONTINUED | OUTPATIENT
Start: 2018-09-06 | End: 2018-09-06 | Stop reason: SDUPTHER

## 2018-09-06 RX ORDER — INDOCYANINE GREEN AND WATER 25 MG
5 KIT INJECTION
Status: COMPLETED | OUTPATIENT
Start: 2018-09-06 | End: 2018-09-06

## 2018-09-06 RX ORDER — DEXAMETHASONE SODIUM PHOSPHATE 4 MG/ML
INJECTION, SOLUTION INTRA-ARTICULAR; INTRALESIONAL; INTRAMUSCULAR; INTRAVENOUS; SOFT TISSUE PRN
Status: DISCONTINUED | OUTPATIENT
Start: 2018-09-06 | End: 2018-09-06 | Stop reason: SDUPTHER

## 2018-09-06 RX ORDER — LIDOCAINE HYDROCHLORIDE 20 MG/ML
INJECTION, SOLUTION INFILTRATION; PERINEURAL PRN
Status: DISCONTINUED | OUTPATIENT
Start: 2018-09-06 | End: 2018-09-06 | Stop reason: SDUPTHER

## 2018-09-06 RX ORDER — FENTANYL CITRATE 50 UG/ML
INJECTION, SOLUTION INTRAMUSCULAR; INTRAVENOUS PRN
Status: DISCONTINUED | OUTPATIENT
Start: 2018-09-06 | End: 2018-09-06 | Stop reason: SDUPTHER

## 2018-09-06 RX ORDER — SODIUM CHLORIDE 0.9 % (FLUSH) 0.9 %
10 SYRINGE (ML) INJECTION PRN
Status: DISCONTINUED | OUTPATIENT
Start: 2018-09-06 | End: 2018-09-06 | Stop reason: HOSPADM

## 2018-09-06 RX ADMIN — NEOSTIGMINE METHYLSULFATE 3 MG: 0.5 INJECTION, SOLUTION INTRAMUSCULAR; INTRAVENOUS; SUBCUTANEOUS at 10:38

## 2018-09-06 RX ADMIN — ONDANSETRON HYDROCHLORIDE 4 MG: 2 INJECTION, SOLUTION INTRAMUSCULAR; INTRAVENOUS at 09:55

## 2018-09-06 RX ADMIN — INDOCYANINE GREEN AND WATER 5 MG: KIT at 09:01

## 2018-09-06 RX ADMIN — DEXAMETHASONE SODIUM PHOSPHATE 8 MG: 4 INJECTION, SOLUTION INTRAMUSCULAR; INTRAVENOUS at 09:55

## 2018-09-06 RX ADMIN — FENTANYL CITRATE 100 MCG: 50 INJECTION, SOLUTION INTRAMUSCULAR; INTRAVENOUS at 10:45

## 2018-09-06 RX ADMIN — SODIUM CHLORIDE: 9 INJECTION, SOLUTION INTRAVENOUS at 09:30

## 2018-09-06 RX ADMIN — Medication 2 G: at 09:30

## 2018-09-06 RX ADMIN — PROPOFOL 150 MG: 10 INJECTION, EMULSION INTRAVENOUS at 09:48

## 2018-09-06 RX ADMIN — OXYCODONE HYDROCHLORIDE 5 MG: 5 TABLET ORAL at 12:21

## 2018-09-06 RX ADMIN — SODIUM CHLORIDE: 9 INJECTION, SOLUTION INTRAVENOUS at 10:02

## 2018-09-06 RX ADMIN — FENTANYL CITRATE 100 MCG: 50 INJECTION, SOLUTION INTRAMUSCULAR; INTRAVENOUS at 09:48

## 2018-09-06 RX ADMIN — LIDOCAINE HYDROCHLORIDE 60 MG: 20 INJECTION, SOLUTION INFILTRATION; PERINEURAL at 09:48

## 2018-09-06 RX ADMIN — ROCURONIUM BROMIDE 40 MG: 10 SOLUTION INTRAVENOUS at 09:49

## 2018-09-06 RX ADMIN — Medication 0.6 MG: at 10:38

## 2018-09-06 RX ADMIN — FENTANYL CITRATE 50 MCG: 50 INJECTION, SOLUTION INTRAMUSCULAR; INTRAVENOUS at 10:32

## 2018-09-06 ASSESSMENT — PULMONARY FUNCTION TESTS
PIF_VALUE: 17
PIF_VALUE: 1
PIF_VALUE: 11
PIF_VALUE: 25
PIF_VALUE: 27
PIF_VALUE: 10
PIF_VALUE: 2
PIF_VALUE: 24
PIF_VALUE: 16
PIF_VALUE: 6
PIF_VALUE: 14
PIF_VALUE: 30
PIF_VALUE: 1
PIF_VALUE: 11
PIF_VALUE: 25
PIF_VALUE: 22
PIF_VALUE: 26
PIF_VALUE: 11
PIF_VALUE: 26
PIF_VALUE: 26
PIF_VALUE: 17
PIF_VALUE: 27
PIF_VALUE: 1
PIF_VALUE: 25
PIF_VALUE: 23
PIF_VALUE: 1
PIF_VALUE: 13
PIF_VALUE: 10
PIF_VALUE: 25
PIF_VALUE: 25
PIF_VALUE: 11
PIF_VALUE: 18
PIF_VALUE: 1
PIF_VALUE: 23
PIF_VALUE: 22
PIF_VALUE: 1
PIF_VALUE: 25
PIF_VALUE: 23
PIF_VALUE: 25
PIF_VALUE: 22
PIF_VALUE: 1
PIF_VALUE: 2
PIF_VALUE: 26
PIF_VALUE: 18
PIF_VALUE: 17
PIF_VALUE: 26
PIF_VALUE: 19
PIF_VALUE: 17
PIF_VALUE: 25
PIF_VALUE: 10
PIF_VALUE: 18
PIF_VALUE: 24
PIF_VALUE: 18
PIF_VALUE: 1
PIF_VALUE: 25
PIF_VALUE: 24
PIF_VALUE: 10
PIF_VALUE: 25
PIF_VALUE: 18
PIF_VALUE: 26
PIF_VALUE: 25
PIF_VALUE: 21
PIF_VALUE: 1
PIF_VALUE: 25
PIF_VALUE: 1
PIF_VALUE: 26
PIF_VALUE: 26
PIF_VALUE: 11

## 2018-09-06 ASSESSMENT — PAIN SCALES - GENERAL
PAINLEVEL_OUTOF10: 2
PAINLEVEL_OUTOF10: 2
PAINLEVEL_OUTOF10: 5
PAINLEVEL_OUTOF10: 0
PAINLEVEL_OUTOF10: 2

## 2018-09-06 ASSESSMENT — PAIN DESCRIPTION - ONSET
ONSET: ON-GOING

## 2018-09-06 ASSESSMENT — PAIN DESCRIPTION - DESCRIPTORS
DESCRIPTORS: ACHING

## 2018-09-06 ASSESSMENT — PAIN DESCRIPTION - LOCATION
LOCATION: ABDOMEN

## 2018-09-06 ASSESSMENT — PAIN - FUNCTIONAL ASSESSMENT: PAIN_FUNCTIONAL_ASSESSMENT: 0-10

## 2018-09-06 ASSESSMENT — PAIN DESCRIPTION - ORIENTATION
ORIENTATION: RIGHT;LEFT;ANTERIOR
ORIENTATION: ANTERIOR;LEFT;RIGHT
ORIENTATION: RIGHT;LEFT;ANTERIOR

## 2018-09-06 ASSESSMENT — PAIN DESCRIPTION - FREQUENCY
FREQUENCY: CONTINUOUS

## 2018-09-06 ASSESSMENT — PAIN DESCRIPTION - PAIN TYPE
TYPE: SURGICAL PAIN

## 2018-09-06 NOTE — OP NOTE
[address] [place] [address]  Hepatobiliary and Pancreatic Surgery      DATE OF PROCEDURE: 9/6/2018    SURGEON: KHRIS Andre: none    PREOPERATIVE DIAGNOSIS: Gallstone Pancreatitis with pancreatic duct stone    POSTOPERATIVE DIAGNOSIS: same    OPERATION: Laparoscopic Robotic Assisted Cholecystectomy     ANESTHESIA: General endotracheal.    ESTIMATED BLOOD LOSS: Less than 10 mL. COMPLICATIONS: None. FLUIDS: Crystalloid. SPECIMEN: Gallbladder. DESCRIPTION OF PROCEDURE: This is a 69 y. o.male whom developed gallstone pancreatitis with subsequent acute necrotic collections along with a pancreatic duct stone. After being explained the risks, benefits, and alternatives of the procedure, he agreed to proceed.     He was taken to the operating room and placed supine on the operating room table, administered general anesthesia and intubated. Once the airway was secured and the patient was adequately sedated a time-out was performed to confirm the surgical site and the patient's name.     We initially made a supraumbilial incision and inserted the Veress needle. Confirmed to be in place we insufflated to 15mmHg mercury. The camera was inserted and an 8mm trocar was inserted at the umbilicus, a 7.8GQ trocar to the right of the umbilicus a 4th 5mm trocar in the right lateral abdomen.  An 8-mm incision in the left side of the abomen and a robotic port was inserted.  We then inserted a camera throught the umbilical port and docked the robot. The fluid collections could be seen elevating the duodenum and the omentum.  A prograsp graspers were used grasp the gallbladder and retract cephalad identifying the infundibulum and we dissected the peritoneal off the infundibulum identifying the cystic duct. The cystic duct and artery were skeletonized using scissors.  The critical view was obtained.  The Hem-o-Lock Weck clip applier was used to place 2 clips distally on the patient side and 1 proximally on

## 2018-09-13 ENCOUNTER — OFFICE VISIT (OUTPATIENT)
Dept: HEMATOLOGY | Age: 78
End: 2018-09-13
Payer: MEDICARE

## 2018-09-13 VITALS
HEIGHT: 67 IN | TEMPERATURE: 97.9 F | SYSTOLIC BLOOD PRESSURE: 134 MMHG | DIASTOLIC BLOOD PRESSURE: 94 MMHG | HEART RATE: 85 BPM | WEIGHT: 151.8 LBS | BODY MASS INDEX: 23.83 KG/M2 | OXYGEN SATURATION: 96 %

## 2018-09-13 DIAGNOSIS — Z90.49 STATUS POST LAPAROSCOPIC CHOLECYSTECTOMY: ICD-10-CM

## 2018-09-13 DIAGNOSIS — K86.3 PANCREATIC PSEUDOCYST: ICD-10-CM

## 2018-09-13 DIAGNOSIS — K86.89 PANCREATIC DUCT CALCULUS: Primary | ICD-10-CM

## 2018-09-13 PROCEDURE — 99024 POSTOP FOLLOW-UP VISIT: CPT | Performed by: TRANSPLANT SURGERY

## 2018-09-13 PROCEDURE — 99211 OFF/OP EST MAY X REQ PHY/QHP: CPT | Performed by: TRANSPLANT SURGERY

## 2018-09-13 NOTE — LETTER
[facility] Pancreatic Surgery  [address] [facility] [address] [place] [address]  Phone: [phone]  Fax: [phone]    Eze Menendez MD        September 13, 2018     Patient: Anival Dyson   YOB: 1940   Date of Visit: 9/13/2018       To Whom It May Concern:     Jaymie Dickson was seen in our office today for a follow up from a surgical procedure that was performed on 09/06/2018. Jackie Doss, patients son was present on both of the dates listed above. If you have any questions or concerns, please don't hesitate to call.     Sincerely,        Eze Menendez MD

## 2018-09-17 RX ORDER — SIMVASTATIN 40 MG
TABLET ORAL
Qty: 90 TABLET | Refills: 1 | Status: SHIPPED | OUTPATIENT
Start: 2018-09-17 | End: 2019-01-30 | Stop reason: SDUPTHER

## 2018-09-18 ENCOUNTER — HOSPITAL ENCOUNTER (OUTPATIENT)
Dept: CARDIAC REHAB | Age: 78
Discharge: HOME | End: 2018-09-18

## 2018-09-18 VITALS — HEIGHT: 67 IN | WEIGHT: 148 LBS | BODY MASS INDEX: 23.23 KG/M2

## 2018-09-18 NOTE — PROGRESS NOTES
Outpatient Dietitian Assessment  9/18/2018    Maria Luisa Rodriguez 69 y.o. male    PCP:   Shaun Brewster PA-C    Assessment:  Patient seen for nutritional counseling due to unintentional weight loss. Patient reports appetite is fair; Patient is drinking one Ensure or Glucerna daily. Usual body weight: 167 pounds in July 2018 per pt; weight now 148 pounds; patient lost 19 pounds over 2 months; Pertinent past medical history/surgical history: chronic severe pancreatitis , s/p cholecystectomy, pancreatic duct stone with pancreatic pseudocysts. Plan is for treatment at [facility] for stone removal. Brief 24 hour dietary recall done and is as followed:   Breakfast: 6:00 am, at home, 1 regular size bowl Wheaties with 2% milk ( no fruit, usually has fruit, usually drinks either 1% or 2% milk), 1 cup decaf coffee with milk  AM snack: none  Lunch: 12:30 pm, at home, 1 ira sandwich with low calorie ham with lettuce, tomato and provolone cheese, and 1 cup Hot Tea ( no condiments)  PM snack: none  Dinner: 5:30 pm, at home, 1/2 steak and 1 cup mashed potatoes with butter, and 1/2 cup sugar free pudding and 8 ounces sugarless ice tea  Evening snack: 1/2 cup corn chips    Ht Readings from Last 1 Encounters:   09/18/18 5' 7\" (1.702 m)     Wt Readings from Last 3 Encounters:   09/18/18 148 lb (67.1 kg)   09/13/18 151 lb 12.8 oz (68.9 kg)   09/06/18 152 lb (68.9 kg)     Body mass index is 23.18 kg/m². Diagnosis:  Unintended weight loss related to insufficient PO intake ( reported decreased PO intake since procedure) as evident by 19 pound weight loss over 2 months. Intervention:  Meet estimated needs. Gain 1 to 2 pounds weight by next review. Adequate hydration. Increased knowledge. Improved compliance. Follow individualized diet. Education provided: Medical Nutrition Therapy High Protein/High calorie diet plan. Monitoring/Evaluation:  Dietitian will monitor weight and food records as available.      The goals set by this patient are to eat 5 times a day and increase oral nutrition supplement ( ONS) to bid per pt preference and gain 1-2 pounds by next review. The patient will be followed on October 8, 2018. Mira Payton, MA, RD, LD

## 2018-10-03 ENCOUNTER — HOSPITAL ENCOUNTER (OUTPATIENT)
Age: 78
Discharge: HOME | End: 2018-10-03

## 2018-10-03 PROCEDURE — 9900000038 HC CARDIAC REHAB PHASE 3 - MONTHLY

## 2018-10-08 DIAGNOSIS — E11.9 TYPE 2 DIABETES MELLITUS WITHOUT COMPLICATION, WITHOUT LONG-TERM CURRENT USE OF INSULIN (HCC): ICD-10-CM

## 2018-10-11 ENCOUNTER — OFFICE VISIT (OUTPATIENT)
Dept: HEMATOLOGY | Age: 78
End: 2018-10-11
Payer: MEDICARE

## 2018-10-11 VITALS
HEIGHT: 67 IN | WEIGHT: 151.9 LBS | BODY MASS INDEX: 23.84 KG/M2 | SYSTOLIC BLOOD PRESSURE: 153 MMHG | HEART RATE: 66 BPM | DIASTOLIC BLOOD PRESSURE: 76 MMHG | TEMPERATURE: 98.1 F | OXYGEN SATURATION: 98 %

## 2018-10-11 DIAGNOSIS — K86.89 PANCREATIC DUCT CALCULUS: Primary | ICD-10-CM

## 2018-10-11 DIAGNOSIS — K85.10 GALLSTONE PANCREATITIS: ICD-10-CM

## 2018-10-11 PROCEDURE — 4040F PNEUMOC VAC/ADMIN/RCVD: CPT | Performed by: TRANSPLANT SURGERY

## 2018-10-11 PROCEDURE — 1036F TOBACCO NON-USER: CPT | Performed by: TRANSPLANT SURGERY

## 2018-10-11 PROCEDURE — G8427 DOCREV CUR MEDS BY ELIG CLIN: HCPCS | Performed by: TRANSPLANT SURGERY

## 2018-10-11 PROCEDURE — 1123F ACP DISCUSS/DSCN MKR DOCD: CPT | Performed by: TRANSPLANT SURGERY

## 2018-10-11 PROCEDURE — G8420 CALC BMI NORM PARAMETERS: HCPCS | Performed by: TRANSPLANT SURGERY

## 2018-10-11 PROCEDURE — 99214 OFFICE O/P EST MOD 30 MIN: CPT | Performed by: TRANSPLANT SURGERY

## 2018-10-11 PROCEDURE — 1101F PT FALLS ASSESS-DOCD LE1/YR: CPT | Performed by: TRANSPLANT SURGERY

## 2018-10-11 PROCEDURE — G8484 FLU IMMUNIZE NO ADMIN: HCPCS | Performed by: TRANSPLANT SURGERY

## 2018-10-11 PROCEDURE — 99212 OFFICE O/P EST SF 10 MIN: CPT | Performed by: TRANSPLANT SURGERY

## 2018-10-11 PROCEDURE — G8598 ASA/ANTIPLAT THER USED: HCPCS | Performed by: TRANSPLANT SURGERY

## 2018-10-11 NOTE — PROGRESS NOTES
Hepatobiliary and Pancreatic Surgery Progress Note    CC: chronic calcific pancreatitis with pancreatic duct obstruction s/p ESWL    Subjective: Patient doing well, states that he had some discomfort after his lithotripsy. He states that pizza and pasta have caused some discomfort. He is still taking his Creon    OBJECTIVE      Physical    VITALS:  BP (!) 153/76 (Site: Right Upper Arm, Position: Sitting, Cuff Size: Medium Adult)   Pulse 66   Temp 98.1 °F (36.7 °C) (Oral)   Ht 5' 7\" (1.702 m)   Wt 151 lb 14.4 oz (68.9 kg)   SpO2 98%   BMI 23.79 kg/m²     General appearance: appears in no acute distress  Lungs:CTABL  Heart: RRR  Abdomen: soft, nondistended, nontympanic, no guarding, no peritoneal signs, normoactive bowel sounds  Extremities:no peripheral edema    ASSESSMENT: Chronic calcific pancreatitis with pancreatic duct obstruction s/p ESWL    PLAN:    - he states that he is scheduled to follow up with Dr Lizeth Garcia  - he is scheduled for a repeat ERCP, if unsuccessful, he will need a repeat ESWL  - if the ESWL is unsuccessful then he will require operative intervention    Thank you for the consultation and allowing me to take part in Mr. Resendez's care.       Nadiya Junior 10/11/2018 1:07 PM

## 2018-10-25 ENCOUNTER — APPOINTMENT (OUTPATIENT)
Dept: CT IMAGING | Age: 78
DRG: 393 | End: 2018-10-25
Payer: MEDICARE

## 2018-10-25 ENCOUNTER — HOSPITAL ENCOUNTER (INPATIENT)
Age: 78
LOS: 2 days | Discharge: HOME | DRG: 393 | End: 2018-10-27
Attending: EMERGENCY MEDICINE | Admitting: INTERNAL MEDICINE
Payer: MEDICARE

## 2018-10-25 DIAGNOSIS — K85.90 ACUTE PANCREATITIS WITHOUT INFECTION OR NECROSIS, UNSPECIFIED PANCREATITIS TYPE: Primary | ICD-10-CM

## 2018-10-25 DIAGNOSIS — R11.2 NON-INTRACTABLE VOMITING WITH NAUSEA, UNSPECIFIED VOMITING TYPE: ICD-10-CM

## 2018-10-25 PROBLEM — Q44.6 CYSTIC DISEASE OF LIVER: Chronic | Status: ACTIVE | Noted: 2018-10-25

## 2018-10-25 PROBLEM — K76.0 HEPATIC STEATOSIS: Chronic | Status: ACTIVE | Noted: 2018-10-25

## 2018-10-25 PROBLEM — K76.0 HEPATIC STEATOSIS: Status: ACTIVE | Noted: 2018-10-25

## 2018-10-25 PROBLEM — N28.1 BILATERAL RENAL CYSTS: Chronic | Status: ACTIVE | Noted: 2018-10-25

## 2018-10-25 PROBLEM — K86.89 PANCREATIC DUCT STONES: Status: ACTIVE | Noted: 2018-10-25

## 2018-10-25 PROBLEM — K80.51 BILE DUCT STONE WITH OBSTRUCT: Status: ACTIVE | Noted: 2018-10-25

## 2018-10-25 PROBLEM — Z98.890 HISTORY OF ERCP: Status: ACTIVE | Noted: 2018-10-25

## 2018-10-25 PROBLEM — N28.1 BILATERAL RENAL CYSTS: Status: ACTIVE | Noted: 2018-10-25

## 2018-10-25 PROBLEM — K86.1 ACUTE ON CHRONIC PANCREATITIS (HCC): Status: ACTIVE | Noted: 2018-10-25

## 2018-10-25 PROBLEM — Q44.6 CYSTIC DISEASE OF LIVER: Status: ACTIVE | Noted: 2018-10-25

## 2018-10-25 LAB
ALBUMIN SERPL-MCNC: 4 G/DL (ref 3.5–5.2)
ALP BLD-CCNC: 37 U/L (ref 40–129)
ALT SERPL-CCNC: 18 U/L (ref 0–40)
ANION GAP SERPL CALCULATED.3IONS-SCNC: 14 MMOL/L (ref 7–16)
AST SERPL-CCNC: 17 U/L (ref 0–39)
BASOPHILS ABSOLUTE: 0 E9/L (ref 0–0.2)
BASOPHILS RELATIVE PERCENT: 0 % (ref 0–2)
BILIRUB SERPL-MCNC: 0.9 MG/DL (ref 0–1.2)
BUN BLDV-MCNC: 32 MG/DL (ref 8–23)
CALCIUM SERPL-MCNC: 9.3 MG/DL (ref 8.6–10.2)
CHLORIDE BLD-SCNC: 102 MMOL/L (ref 98–107)
CO2: 23 MMOL/L (ref 22–29)
CREAT SERPL-MCNC: 1 MG/DL (ref 0.7–1.2)
EKG ATRIAL RATE: 79 BPM
EKG P AXIS: 50 DEGREES
EKG P-R INTERVAL: 178 MS
EKG Q-T INTERVAL: 368 MS
EKG QRS DURATION: 82 MS
EKG QTC CALCULATION (BAZETT): 421 MS
EKG R AXIS: 54 DEGREES
EKG T AXIS: 50 DEGREES
EKG VENTRICULAR RATE: 79 BPM
EOSINOPHILS ABSOLUTE: 0 E9/L (ref 0.05–0.5)
EOSINOPHILS RELATIVE PERCENT: 0 % (ref 0–6)
GFR AFRICAN AMERICAN: >60
GFR NON-AFRICAN AMERICAN: >60 ML/MIN/1.73
GLUCOSE BLD-MCNC: 218 MG/DL (ref 74–109)
HCT VFR BLD CALC: 43.8 % (ref 37–54)
HEMOGLOBIN: 14.4 G/DL (ref 12.5–16.5)
LACTIC ACID: 1.6 MMOL/L (ref 0.5–2.2)
LIPASE: 728 U/L (ref 13–60)
LYMPHOCYTES ABSOLUTE: 0.11 E9/L (ref 1.5–4)
LYMPHOCYTES RELATIVE PERCENT: 0.9 % (ref 20–42)
MCH RBC QN AUTO: 30.9 PG (ref 26–35)
MCHC RBC AUTO-ENTMCNC: 32.9 % (ref 32–34.5)
MCV RBC AUTO: 94 FL (ref 80–99.9)
METER GLUCOSE: 131 MG/DL (ref 70–110)
MONOCYTES ABSOLUTE: 0.33 E9/L (ref 0.1–0.95)
MONOCYTES RELATIVE PERCENT: 2.6 % (ref 2–12)
NEUTROPHILS ABSOLUTE: 10.67 E9/L (ref 1.8–7.3)
NEUTROPHILS RELATIVE PERCENT: 96.5 % (ref 43–80)
NUCLEATED RED BLOOD CELLS: 0 /100 WBC
PDW BLD-RTO: 14.8 FL (ref 11.5–15)
PLATELET # BLD: 260 E9/L (ref 130–450)
PMV BLD AUTO: 9.3 FL (ref 7–12)
POTASSIUM SERPL-SCNC: 4.3 MMOL/L (ref 3.5–5)
RBC # BLD: 4.66 E12/L (ref 3.8–5.8)
RBC # BLD: NORMAL 10*6/UL
SODIUM BLD-SCNC: 139 MMOL/L (ref 132–146)
TOTAL PROTEIN: 6.6 G/DL (ref 6.4–8.3)
TROPONIN: <0.01 NG/ML (ref 0–0.03)
WBC # BLD: 11 E9/L (ref 4.5–11.5)

## 2018-10-25 PROCEDURE — 6360000002 HC RX W HCPCS: Performed by: EMERGENCY MEDICINE

## 2018-10-25 PROCEDURE — 6370000000 HC RX 637 (ALT 250 FOR IP): Performed by: STUDENT IN AN ORGANIZED HEALTH CARE EDUCATION/TRAINING PROGRAM

## 2018-10-25 PROCEDURE — 2580000003 HC RX 258: Performed by: STUDENT IN AN ORGANIZED HEALTH CARE EDUCATION/TRAINING PROGRAM

## 2018-10-25 PROCEDURE — 85025 COMPLETE CBC W/AUTO DIFF WBC: CPT

## 2018-10-25 PROCEDURE — 83605 ASSAY OF LACTIC ACID: CPT

## 2018-10-25 PROCEDURE — 74177 CT ABD & PELVIS W/CONTRAST: CPT

## 2018-10-25 PROCEDURE — 96374 THER/PROPH/DIAG INJ IV PUSH: CPT

## 2018-10-25 PROCEDURE — 2500000003 HC RX 250 WO HCPCS: Performed by: STUDENT IN AN ORGANIZED HEALTH CARE EDUCATION/TRAINING PROGRAM

## 2018-10-25 PROCEDURE — 80053 COMPREHEN METABOLIC PANEL: CPT

## 2018-10-25 PROCEDURE — 2580000003 HC RX 258: Performed by: PHYSICIAN ASSISTANT

## 2018-10-25 PROCEDURE — 2580000003 HC RX 258: Performed by: TRANSPLANT SURGERY

## 2018-10-25 PROCEDURE — 83690 ASSAY OF LIPASE: CPT

## 2018-10-25 PROCEDURE — 36415 COLL VENOUS BLD VENIPUNCTURE: CPT

## 2018-10-25 PROCEDURE — 6360000004 HC RX CONTRAST MEDICATION: Performed by: RADIOLOGY

## 2018-10-25 PROCEDURE — 93005 ELECTROCARDIOGRAM TRACING: CPT | Performed by: PHYSICIAN ASSISTANT

## 2018-10-25 PROCEDURE — S0028 INJECTION, FAMOTIDINE, 20 MG: HCPCS | Performed by: STUDENT IN AN ORGANIZED HEALTH CARE EDUCATION/TRAINING PROGRAM

## 2018-10-25 PROCEDURE — 99285 EMERGENCY DEPT VISIT HI MDM: CPT

## 2018-10-25 PROCEDURE — 99223 1ST HOSP IP/OBS HIGH 75: CPT | Performed by: INTERNAL MEDICINE

## 2018-10-25 PROCEDURE — 6360000002 HC RX W HCPCS: Performed by: PHYSICIAN ASSISTANT

## 2018-10-25 PROCEDURE — 82962 GLUCOSE BLOOD TEST: CPT

## 2018-10-25 PROCEDURE — 99222 1ST HOSP IP/OBS MODERATE 55: CPT | Performed by: TRANSPLANT SURGERY

## 2018-10-25 PROCEDURE — 84484 ASSAY OF TROPONIN QUANT: CPT

## 2018-10-25 PROCEDURE — 1200000000 HC SEMI PRIVATE

## 2018-10-25 PROCEDURE — 6360000002 HC RX W HCPCS: Performed by: STUDENT IN AN ORGANIZED HEALTH CARE EDUCATION/TRAINING PROGRAM

## 2018-10-25 RX ORDER — SODIUM CHLORIDE 0.9 % (FLUSH) 0.9 %
10 SYRINGE (ML) INJECTION PRN
Status: DISCONTINUED | OUTPATIENT
Start: 2018-10-25 | End: 2018-10-27 | Stop reason: HOSPADM

## 2018-10-25 RX ORDER — SODIUM CHLORIDE 0.9 % (FLUSH) 0.9 %
10 SYRINGE (ML) INJECTION EVERY 12 HOURS SCHEDULED
Status: DISCONTINUED | OUTPATIENT
Start: 2018-10-25 | End: 2018-10-27 | Stop reason: HOSPADM

## 2018-10-25 RX ORDER — ONDANSETRON 2 MG/ML
4 INJECTION INTRAMUSCULAR; INTRAVENOUS EVERY 6 HOURS PRN
Status: DISCONTINUED | OUTPATIENT
Start: 2018-10-25 | End: 2018-10-27 | Stop reason: HOSPADM

## 2018-10-25 RX ORDER — ONDANSETRON 4 MG/1
4 TABLET, ORALLY DISINTEGRATING ORAL ONCE
Status: COMPLETED | OUTPATIENT
Start: 2018-10-25 | End: 2018-10-25

## 2018-10-25 RX ORDER — MORPHINE SULFATE 2 MG/ML
2 INJECTION, SOLUTION INTRAMUSCULAR; INTRAVENOUS EVERY 4 HOURS PRN
Status: DISCONTINUED | OUTPATIENT
Start: 2018-10-25 | End: 2018-10-27 | Stop reason: HOSPADM

## 2018-10-25 RX ORDER — MORPHINE SULFATE 2 MG/ML
4 INJECTION, SOLUTION INTRAMUSCULAR; INTRAVENOUS ONCE
Status: COMPLETED | OUTPATIENT
Start: 2018-10-25 | End: 2018-10-25

## 2018-10-25 RX ORDER — DEXTROSE MONOHYDRATE 50 MG/ML
100 INJECTION, SOLUTION INTRAVENOUS PRN
Status: DISCONTINUED | OUTPATIENT
Start: 2018-10-25 | End: 2018-10-27 | Stop reason: HOSPADM

## 2018-10-25 RX ORDER — SODIUM CHLORIDE, SODIUM LACTATE, POTASSIUM CHLORIDE, CALCIUM CHLORIDE 600; 310; 30; 20 MG/100ML; MG/100ML; MG/100ML; MG/100ML
INJECTION, SOLUTION INTRAVENOUS CONTINUOUS
Status: DISCONTINUED | OUTPATIENT
Start: 2018-10-25 | End: 2018-10-27 | Stop reason: HOSPADM

## 2018-10-25 RX ORDER — ONDANSETRON 2 MG/ML
4 INJECTION INTRAMUSCULAR; INTRAVENOUS ONCE
Status: COMPLETED | OUTPATIENT
Start: 2018-10-25 | End: 2018-10-25

## 2018-10-25 RX ORDER — DEXTROSE MONOHYDRATE 25 G/50ML
12.5 INJECTION, SOLUTION INTRAVENOUS PRN
Status: DISCONTINUED | OUTPATIENT
Start: 2018-10-25 | End: 2018-10-27 | Stop reason: HOSPADM

## 2018-10-25 RX ORDER — 0.9 % SODIUM CHLORIDE 0.9 %
1000 INTRAVENOUS SOLUTION INTRAVENOUS ONCE
Status: COMPLETED | OUTPATIENT
Start: 2018-10-25 | End: 2018-10-25

## 2018-10-25 RX ORDER — SODIUM CHLORIDE 9 MG/ML
INJECTION, SOLUTION INTRAVENOUS CONTINUOUS
Status: DISCONTINUED | OUTPATIENT
Start: 2018-10-25 | End: 2018-10-25 | Stop reason: ALTCHOICE

## 2018-10-25 RX ORDER — LOSARTAN POTASSIUM 50 MG/1
100 TABLET ORAL DAILY
Status: DISCONTINUED | OUTPATIENT
Start: 2018-10-25 | End: 2018-10-27 | Stop reason: HOSPADM

## 2018-10-25 RX ORDER — NICOTINE POLACRILEX 4 MG
15 LOZENGE BUCCAL PRN
Status: DISCONTINUED | OUTPATIENT
Start: 2018-10-25 | End: 2018-10-27 | Stop reason: HOSPADM

## 2018-10-25 RX ORDER — ISOSORBIDE MONONITRATE 30 MG/1
30 TABLET, EXTENDED RELEASE ORAL DAILY
Status: DISCONTINUED | OUTPATIENT
Start: 2018-10-25 | End: 2018-10-27 | Stop reason: HOSPADM

## 2018-10-25 RX ADMIN — SODIUM CHLORIDE: 9 INJECTION, SOLUTION INTRAVENOUS at 20:13

## 2018-10-25 RX ADMIN — SODIUM CHLORIDE, POTASSIUM CHLORIDE, SODIUM LACTATE AND CALCIUM CHLORIDE: 600; 310; 30; 20 INJECTION, SOLUTION INTRAVENOUS at 22:24

## 2018-10-25 RX ADMIN — ONDANSETRON 4 MG: 2 INJECTION INTRAMUSCULAR; INTRAVENOUS at 18:05

## 2018-10-25 RX ADMIN — ENOXAPARIN SODIUM 40 MG: 40 INJECTION SUBCUTANEOUS at 20:07

## 2018-10-25 RX ADMIN — ONDANSETRON HYDROCHLORIDE 4 MG: 2 SOLUTION INTRAMUSCULAR; INTRAVENOUS at 22:29

## 2018-10-25 RX ADMIN — ONDANSETRON 4 MG: 4 TABLET, ORALLY DISINTEGRATING ORAL at 13:13

## 2018-10-25 RX ADMIN — MORPHINE SULFATE 2 MG: 2 INJECTION, SOLUTION INTRAMUSCULAR; INTRAVENOUS at 22:31

## 2018-10-25 RX ADMIN — MORPHINE SULFATE 4 MG: 2 INJECTION, SOLUTION INTRAMUSCULAR; INTRAVENOUS at 18:05

## 2018-10-25 RX ADMIN — FAMOTIDINE 20 MG: 10 INJECTION, SOLUTION INTRAVENOUS at 20:09

## 2018-10-25 RX ADMIN — LOSARTAN POTASSIUM 100 MG: 50 TABLET, FILM COATED ORAL at 20:07

## 2018-10-25 RX ADMIN — IOPAMIDOL 110 ML: 755 INJECTION, SOLUTION INTRAVENOUS at 14:30

## 2018-10-25 RX ADMIN — SODIUM CHLORIDE 1000 ML: 9 INJECTION, SOLUTION INTRAVENOUS at 13:12

## 2018-10-25 RX ADMIN — MORPHINE SULFATE 4 MG: 2 INJECTION, SOLUTION INTRAMUSCULAR; INTRAVENOUS at 13:15

## 2018-10-25 RX ADMIN — Medication 10 ML: at 20:07

## 2018-10-25 RX ADMIN — ISOSORBIDE MONONITRATE 30 MG: 30 TABLET, EXTENDED RELEASE ORAL at 20:07

## 2018-10-25 ASSESSMENT — PAIN SCALES - GENERAL
PAINLEVEL_OUTOF10: 7
PAINLEVEL_OUTOF10: 6
PAINLEVEL_OUTOF10: 6
PAINLEVEL_OUTOF10: 3
PAINLEVEL_OUTOF10: 0
PAINLEVEL_OUTOF10: 7
PAINLEVEL_OUTOF10: 7

## 2018-10-25 ASSESSMENT — PAIN DESCRIPTION - PAIN TYPE
TYPE: ACUTE PAIN

## 2018-10-25 ASSESSMENT — ENCOUNTER SYMPTOMS
BACK PAIN: 1
DIARRHEA: 0
EYE DISCHARGE: 0
PHOTOPHOBIA: 0
BLOOD IN STOOL: 0
SHORTNESS OF BREATH: 0
CONSTIPATION: 0
VOMITING: 0
EYE PAIN: 0
NAUSEA: 0
ABDOMINAL PAIN: 1

## 2018-10-25 ASSESSMENT — PAIN DESCRIPTION - PROGRESSION: CLINICAL_PROGRESSION: GRADUALLY WORSENING

## 2018-10-25 ASSESSMENT — PAIN DESCRIPTION - DESCRIPTORS
DESCRIPTORS: CRAMPING

## 2018-10-25 ASSESSMENT — PAIN DESCRIPTION - LOCATION
LOCATION: ABDOMEN

## 2018-10-25 ASSESSMENT — PAIN DESCRIPTION - ORIENTATION
ORIENTATION: RIGHT;LEFT;MID;LOWER
ORIENTATION: RIGHT;LEFT;MID;UPPER
ORIENTATION: RIGHT;LEFT;MID;LOWER

## 2018-10-25 ASSESSMENT — PAIN DESCRIPTION - FREQUENCY
FREQUENCY: CONTINUOUS

## 2018-10-25 ASSESSMENT — PAIN DESCRIPTION - ONSET: ONSET: ON-GOING

## 2018-10-25 NOTE — ED NOTES
Faxed SBAR to floor, spoke with floor, they received fax. Pt ready to go.      Shima Sandra RN  10/25/18 5508

## 2018-10-25 NOTE — PROGRESS NOTES
Database complete. Medications reconciled. Care plans and education initiated. Cardiologist is Dr. Chidi Polanco. Urologist is Dr. Adriana Enriquez. 30 lb weight loss in past 6 months.

## 2018-10-25 NOTE — H&P
pancreatic duct stent. Extensive inflammatory type changes and fluid collections present at pancreatic head and neck region in particular. Status post interval cholecystectomy. Some suspected persistent or residual choledocholithiasis, but correlation with endoscopic/surgical observations recommended. Somewhat prominent colonic gas and stool warranting clinical correlation for ileus, constipation, etc. Some apparent foreign material noted in distal small bowel at RLQ region, see series 2 images 76-82, potentially recent postprocedural in origin but with clinical correlation necessary in this regard. No significant pleural effusion or pulmonary consolidation at lung bases. No margoth pneumoperitoneum, extensive ascites, or large defined intra-abdominal abscess cavity seen. Numerous additional abnormal findings as detailed in prior reports including CAD, large hiatal hernia, diffuse hepatic steatosis, probable benign hepatic and bilateral renal cysts, aortoiliac atherosclerosis with infrarenal saccular abdominal aortic aneurysm, distal colonic diverticulosis without obvious associated acute diverticulitis, prostatomegaly, lumbar spondylosis changes. 1. Status post cholecystectomy and apparent pancreatic duct stent placement. 2. Extensive pancreatitis findings. Clinical/laboratory correlation suggested. Specialty referral/follow-up suggested. 3. Multiple additional findings as noted above and previously.  **ALERT: THIS IS AN ABNORMAL REPORT**       EKG:   Normal sinus rhythm  Normal ECG    ASSESSMENT:      Principal Problem:    Acute on chronic pancreatitis (HCC)  Active Problems:    Hypertension    Hyperlipidemia    GERD (gastroesophageal reflux disease)    Type 2 diabetes mellitus without complication, without long-term current use of insulin (HCC)    Bile duct stone with obstruct    Pancreatitis, unspecified pancreatitis type    Hepatic steatosis    Bilateral renal cysts    Cystic disease of liver    History of

## 2018-10-26 PROBLEM — E44.0 MODERATE PROTEIN-CALORIE MALNUTRITION (HCC): Chronic | Status: ACTIVE | Noted: 2018-10-26

## 2018-10-26 LAB
ALBUMIN SERPL-MCNC: 3.2 G/DL (ref 3.5–5.2)
ALP BLD-CCNC: 32 U/L (ref 40–129)
ALT SERPL-CCNC: 14 U/L (ref 0–40)
ANION GAP SERPL CALCULATED.3IONS-SCNC: 12 MMOL/L (ref 7–16)
AST SERPL-CCNC: 15 U/L (ref 0–39)
BASOPHILS ABSOLUTE: 0.02 E9/L (ref 0–0.2)
BASOPHILS RELATIVE PERCENT: 0.2 % (ref 0–2)
BILIRUB SERPL-MCNC: 1 MG/DL (ref 0–1.2)
BUN BLDV-MCNC: 23 MG/DL (ref 8–23)
CALCIUM SERPL-MCNC: 8.4 MG/DL (ref 8.6–10.2)
CHLORIDE BLD-SCNC: 105 MMOL/L (ref 98–107)
CO2: 24 MMOL/L (ref 22–29)
CREAT SERPL-MCNC: 1.1 MG/DL (ref 0.7–1.2)
EOSINOPHILS ABSOLUTE: 0.08 E9/L (ref 0.05–0.5)
EOSINOPHILS RELATIVE PERCENT: 1 % (ref 0–6)
GFR AFRICAN AMERICAN: >60
GFR NON-AFRICAN AMERICAN: >60 ML/MIN/1.73
GLUCOSE BLD-MCNC: 142 MG/DL (ref 74–109)
HCT VFR BLD CALC: 40.6 % (ref 37–54)
HEMOGLOBIN: 13 G/DL (ref 12.5–16.5)
IMMATURE GRANULOCYTES #: 0.03 E9/L
IMMATURE GRANULOCYTES %: 0.4 % (ref 0–5)
LIPASE: 132 U/L (ref 13–60)
LYMPHOCYTES ABSOLUTE: 0.9 E9/L (ref 1.5–4)
LYMPHOCYTES RELATIVE PERCENT: 10.9 % (ref 20–42)
MCH RBC QN AUTO: 31.2 PG (ref 26–35)
MCHC RBC AUTO-ENTMCNC: 32 % (ref 32–34.5)
MCV RBC AUTO: 97.4 FL (ref 80–99.9)
METER GLUCOSE: 102 MG/DL (ref 70–110)
METER GLUCOSE: 107 MG/DL (ref 70–110)
METER GLUCOSE: 112 MG/DL (ref 70–110)
METER GLUCOSE: 136 MG/DL (ref 70–110)
METER GLUCOSE: 180 MG/DL (ref 70–110)
MONOCYTES ABSOLUTE: 0.61 E9/L (ref 0.1–0.95)
MONOCYTES RELATIVE PERCENT: 7.4 % (ref 2–12)
NEUTROPHILS ABSOLUTE: 6.6 E9/L (ref 1.8–7.3)
NEUTROPHILS RELATIVE PERCENT: 80.1 % (ref 43–80)
PDW BLD-RTO: 15.4 FL (ref 11.5–15)
PLATELET # BLD: 234 E9/L (ref 130–450)
PMV BLD AUTO: 10.1 FL (ref 7–12)
POTASSIUM REFLEX MAGNESIUM: 4.1 MMOL/L (ref 3.5–5)
RBC # BLD: 4.17 E12/L (ref 3.8–5.8)
SODIUM BLD-SCNC: 141 MMOL/L (ref 132–146)
TOTAL PROTEIN: 5.7 G/DL (ref 6.4–8.3)
WBC # BLD: 8.2 E9/L (ref 4.5–11.5)

## 2018-10-26 PROCEDURE — 1200000000 HC SEMI PRIVATE

## 2018-10-26 PROCEDURE — 6370000000 HC RX 637 (ALT 250 FOR IP): Performed by: STUDENT IN AN ORGANIZED HEALTH CARE EDUCATION/TRAINING PROGRAM

## 2018-10-26 PROCEDURE — 2580000003 HC RX 258: Performed by: STUDENT IN AN ORGANIZED HEALTH CARE EDUCATION/TRAINING PROGRAM

## 2018-10-26 PROCEDURE — 80053 COMPREHEN METABOLIC PANEL: CPT

## 2018-10-26 PROCEDURE — S0028 INJECTION, FAMOTIDINE, 20 MG: HCPCS | Performed by: STUDENT IN AN ORGANIZED HEALTH CARE EDUCATION/TRAINING PROGRAM

## 2018-10-26 PROCEDURE — 83690 ASSAY OF LIPASE: CPT

## 2018-10-26 PROCEDURE — 85025 COMPLETE CBC W/AUTO DIFF WBC: CPT

## 2018-10-26 PROCEDURE — 99232 SBSQ HOSP IP/OBS MODERATE 35: CPT | Performed by: INTERNAL MEDICINE

## 2018-10-26 PROCEDURE — 36415 COLL VENOUS BLD VENIPUNCTURE: CPT

## 2018-10-26 PROCEDURE — 6360000002 HC RX W HCPCS: Performed by: STUDENT IN AN ORGANIZED HEALTH CARE EDUCATION/TRAINING PROGRAM

## 2018-10-26 PROCEDURE — 2500000003 HC RX 250 WO HCPCS: Performed by: STUDENT IN AN ORGANIZED HEALTH CARE EDUCATION/TRAINING PROGRAM

## 2018-10-26 PROCEDURE — 82962 GLUCOSE BLOOD TEST: CPT

## 2018-10-26 PROCEDURE — 2580000003 HC RX 258: Performed by: TRANSPLANT SURGERY

## 2018-10-26 PROCEDURE — 99232 SBSQ HOSP IP/OBS MODERATE 35: CPT | Performed by: SURGERY

## 2018-10-26 PROCEDURE — 6370000000 HC RX 637 (ALT 250 FOR IP): Performed by: SURGERY

## 2018-10-26 RX ORDER — DOCUSATE SODIUM 100 MG/1
100 CAPSULE, LIQUID FILLED ORAL DAILY
Status: DISCONTINUED | OUTPATIENT
Start: 2018-10-26 | End: 2018-10-27 | Stop reason: HOSPADM

## 2018-10-26 RX ORDER — HYDROCODONE BITARTRATE AND ACETAMINOPHEN 5; 325 MG/1; MG/1
1 TABLET ORAL EVERY 4 HOURS PRN
Status: DISCONTINUED | OUTPATIENT
Start: 2018-10-26 | End: 2018-10-27 | Stop reason: HOSPADM

## 2018-10-26 RX ORDER — BISACODYL 10 MG
10 SUPPOSITORY, RECTAL RECTAL ONCE
Status: DISCONTINUED | OUTPATIENT
Start: 2018-10-26 | End: 2018-10-27 | Stop reason: HOSPADM

## 2018-10-26 RX ORDER — OXYCODONE HYDROCHLORIDE AND ACETAMINOPHEN 5; 325 MG/1; MG/1
1 TABLET ORAL EVERY 6 HOURS PRN
Status: DISCONTINUED | OUTPATIENT
Start: 2018-10-26 | End: 2018-10-26

## 2018-10-26 RX ORDER — POLYETHYLENE GLYCOL 3350 17 G/17G
17 POWDER, FOR SOLUTION ORAL DAILY
Status: DISCONTINUED | OUTPATIENT
Start: 2018-10-26 | End: 2018-10-27 | Stop reason: HOSPADM

## 2018-10-26 RX ADMIN — Medication 10 ML: at 20:20

## 2018-10-26 RX ADMIN — MORPHINE SULFATE 2 MG: 2 INJECTION, SOLUTION INTRAMUSCULAR; INTRAVENOUS at 04:18

## 2018-10-26 RX ADMIN — FAMOTIDINE 20 MG: 10 INJECTION, SOLUTION INTRAVENOUS at 20:20

## 2018-10-26 RX ADMIN — SODIUM CHLORIDE, POTASSIUM CHLORIDE, SODIUM LACTATE AND CALCIUM CHLORIDE: 600; 310; 30; 20 INJECTION, SOLUTION INTRAVENOUS at 12:13

## 2018-10-26 RX ADMIN — SODIUM CHLORIDE, POTASSIUM CHLORIDE, SODIUM LACTATE AND CALCIUM CHLORIDE: 600; 310; 30; 20 INJECTION, SOLUTION INTRAVENOUS at 04:22

## 2018-10-26 RX ADMIN — FAMOTIDINE 20 MG: 10 INJECTION, SOLUTION INTRAVENOUS at 09:12

## 2018-10-26 RX ADMIN — PANCRELIPASE 2 CAPSULE: 60000; 12000; 38000 CAPSULE, DELAYED RELEASE PELLETS ORAL at 17:36

## 2018-10-26 RX ADMIN — INSULIN LISPRO 1 UNITS: 100 INJECTION, SOLUTION INTRAVENOUS; SUBCUTANEOUS at 12:30

## 2018-10-26 RX ADMIN — OXYCODONE AND ACETAMINOPHEN 1 TABLET: 5; 325 TABLET ORAL at 12:27

## 2018-10-26 RX ADMIN — ENOXAPARIN SODIUM 40 MG: 40 INJECTION SUBCUTANEOUS at 09:12

## 2018-10-26 RX ADMIN — LOSARTAN POTASSIUM 100 MG: 50 TABLET, FILM COATED ORAL at 09:12

## 2018-10-26 RX ADMIN — ONDANSETRON HYDROCHLORIDE 4 MG: 2 SOLUTION INTRAMUSCULAR; INTRAVENOUS at 12:27

## 2018-10-26 RX ADMIN — ISOSORBIDE MONONITRATE 30 MG: 30 TABLET, EXTENDED RELEASE ORAL at 09:12

## 2018-10-26 ASSESSMENT — PAIN SCALES - GENERAL
PAINLEVEL_OUTOF10: 0
PAINLEVEL_OUTOF10: 0
PAINLEVEL_OUTOF10: 4
PAINLEVEL_OUTOF10: 4

## 2018-10-26 ASSESSMENT — PAIN DESCRIPTION - ONSET: ONSET: ON-GOING

## 2018-10-26 ASSESSMENT — PAIN DESCRIPTION - FREQUENCY: FREQUENCY: CONTINUOUS

## 2018-10-26 ASSESSMENT — PAIN DESCRIPTION - LOCATION: LOCATION: THROAT

## 2018-10-26 ASSESSMENT — PAIN DESCRIPTION - PAIN TYPE: TYPE: ACUTE PAIN

## 2018-10-26 ASSESSMENT — PAIN DESCRIPTION - DESCRIPTORS: DESCRIPTORS: ACHING;BURNING;DISCOMFORT

## 2018-10-26 NOTE — PROGRESS NOTES
RESIDENT PHYSICIAN NOTE    Hospitalist Progress Note    Admitting Date and Time: 10/25/2018 12:45 PM  Admit Dx: Pancreatitis, unspecified pancreatitis type [K85.90]  Pancreatitis, unspecified pancreatitis type [K85.90]  Acute on chronic pancreatitis ([address] [K85.90, K86.1]  Pancreatitis, unspecified pancreatitis type [K85.90]    Subjective:    Pt feels better today. Tolerating liquids. Would like to advance diet. Only pain is with bending over. Per RN: no complaints. ROS: denies fever, chills, cp, sob, n/v, HA unless stated above.      isosorbide mononitrate  30 mg Oral Daily    losartan  100 mg Oral Daily    sodium chloride flush  10 mL Intravenous 2 times per day    enoxaparin  40 mg Subcutaneous Daily    famotidine (PEPCID) injection  20 mg Intravenous BID    insulin lispro  0-6 Units Subcutaneous TID WC    insulin lispro  0-3 Units Subcutaneous Nightly       sodium chloride flush 10 mL PRN   magnesium hydroxide 30 mL Daily PRN   ondansetron 4 mg Q6H PRN   morphine 2 mg Q4H PRN   glucose 15 g PRN   dextrose 12.5 g PRN   glucagon (rDNA) 1 mg PRN   dextrose 100 mL/hr PRN        Objective:    /66   Pulse 68   Temp 98.8 °F (37.1 °C) (Oral)   Resp 18   Ht 5' 7\" (1.702 m)   Wt 154 lb (69.9 kg)   SpO2 97%   BMI 24.12 kg/m²   General Appearance: alert and oriented to person, place and time, well developed and well- nourished, in no acute distress  Skin: warm and dry, no rash or erythema  Head: normocephalic and atraumatic  Eyes: pupils equal, round, and reactive to light,  conjunctivae normal  Neck: supple and non-tender without mass, no thyromegaly or thyroid nodules, no cervical lymphadenopathy  Pulmonary/Chest: clear to auscultation bilaterally- no wheezes, rales or rhonchi, normal air movement, no respiratory distress  Cardiovascular: normal rate, regular rhythm, normal S1 and S2, no murmurs, rubs, clicks, or gallops, distal pulses intact, no carotid bruits  Abdomen: distended, tender over

## 2018-10-26 NOTE — PLAN OF CARE
Problem: Falls - Risk of:  Goal: Will remain free from falls  Will remain free from falls   Outcome: Met This Shift      Problem: Pain:  Goal: Pain level will decrease  Pain level will decrease     Outcome: Met This Shift

## 2018-10-27 VITALS
SYSTOLIC BLOOD PRESSURE: 134 MMHG | BODY MASS INDEX: 25.18 KG/M2 | HEART RATE: 63 BPM | TEMPERATURE: 97.9 F | OXYGEN SATURATION: 96 % | DIASTOLIC BLOOD PRESSURE: 63 MMHG | HEIGHT: 67 IN | RESPIRATION RATE: 14 BRPM | WEIGHT: 160.4 LBS

## 2018-10-27 LAB
ALBUMIN SERPL-MCNC: 2.9 G/DL (ref 3.5–5.2)
ALP BLD-CCNC: 32 U/L (ref 40–129)
ALT SERPL-CCNC: 11 U/L (ref 0–40)
ANION GAP SERPL CALCULATED.3IONS-SCNC: 10 MMOL/L (ref 7–16)
AST SERPL-CCNC: 17 U/L (ref 0–39)
BASOPHILS ABSOLUTE: 0.03 E9/L (ref 0–0.2)
BASOPHILS RELATIVE PERCENT: 0.4 % (ref 0–2)
BILIRUB SERPL-MCNC: 0.8 MG/DL (ref 0–1.2)
BUN BLDV-MCNC: 15 MG/DL (ref 8–23)
CALCIUM SERPL-MCNC: 8.3 MG/DL (ref 8.6–10.2)
CHLORIDE BLD-SCNC: 102 MMOL/L (ref 98–107)
CO2: 25 MMOL/L (ref 22–29)
CREAT SERPL-MCNC: 1 MG/DL (ref 0.7–1.2)
EOSINOPHILS ABSOLUTE: 0.14 E9/L (ref 0.05–0.5)
EOSINOPHILS RELATIVE PERCENT: 2.1 % (ref 0–6)
GFR AFRICAN AMERICAN: >60
GFR NON-AFRICAN AMERICAN: >60 ML/MIN/1.73
GLUCOSE BLD-MCNC: 98 MG/DL (ref 74–109)
HCT VFR BLD CALC: 35.6 % (ref 37–54)
HEMOGLOBIN: 11.5 G/DL (ref 12.5–16.5)
IMMATURE GRANULOCYTES #: 0.02 E9/L
IMMATURE GRANULOCYTES %: 0.3 % (ref 0–5)
LIPASE: 35 U/L (ref 13–60)
LYMPHOCYTES ABSOLUTE: 0.81 E9/L (ref 1.5–4)
LYMPHOCYTES RELATIVE PERCENT: 12 % (ref 20–42)
MCH RBC QN AUTO: 31.4 PG (ref 26–35)
MCHC RBC AUTO-ENTMCNC: 32.3 % (ref 32–34.5)
MCV RBC AUTO: 97.3 FL (ref 80–99.9)
METER GLUCOSE: 128 MG/DL (ref 70–110)
METER GLUCOSE: 187 MG/DL (ref 70–110)
MONOCYTES ABSOLUTE: 0.58 E9/L (ref 0.1–0.95)
MONOCYTES RELATIVE PERCENT: 8.6 % (ref 2–12)
NEUTROPHILS ABSOLUTE: 5.15 E9/L (ref 1.8–7.3)
NEUTROPHILS RELATIVE PERCENT: 76.6 % (ref 43–80)
PDW BLD-RTO: 14.9 FL (ref 11.5–15)
PLATELET # BLD: 176 E9/L (ref 130–450)
PMV BLD AUTO: 10 FL (ref 7–12)
POTASSIUM REFLEX MAGNESIUM: 4 MMOL/L (ref 3.5–5)
RBC # BLD: 3.66 E12/L (ref 3.8–5.8)
SODIUM BLD-SCNC: 137 MMOL/L (ref 132–146)
TOTAL PROTEIN: 5.2 G/DL (ref 6.4–8.3)
WBC # BLD: 6.7 E9/L (ref 4.5–11.5)

## 2018-10-27 PROCEDURE — 6370000000 HC RX 637 (ALT 250 FOR IP): Performed by: SURGERY

## 2018-10-27 PROCEDURE — 82962 GLUCOSE BLOOD TEST: CPT

## 2018-10-27 PROCEDURE — 80053 COMPREHEN METABOLIC PANEL: CPT

## 2018-10-27 PROCEDURE — 6360000002 HC RX W HCPCS: Performed by: STUDENT IN AN ORGANIZED HEALTH CARE EDUCATION/TRAINING PROGRAM

## 2018-10-27 PROCEDURE — 2500000003 HC RX 250 WO HCPCS: Performed by: STUDENT IN AN ORGANIZED HEALTH CARE EDUCATION/TRAINING PROGRAM

## 2018-10-27 PROCEDURE — 85025 COMPLETE CBC W/AUTO DIFF WBC: CPT

## 2018-10-27 PROCEDURE — 83690 ASSAY OF LIPASE: CPT

## 2018-10-27 PROCEDURE — 6370000000 HC RX 637 (ALT 250 FOR IP): Performed by: STUDENT IN AN ORGANIZED HEALTH CARE EDUCATION/TRAINING PROGRAM

## 2018-10-27 PROCEDURE — 36415 COLL VENOUS BLD VENIPUNCTURE: CPT

## 2018-10-27 PROCEDURE — 2580000003 HC RX 258: Performed by: STUDENT IN AN ORGANIZED HEALTH CARE EDUCATION/TRAINING PROGRAM

## 2018-10-27 PROCEDURE — 99232 SBSQ HOSP IP/OBS MODERATE 35: CPT | Performed by: SURGERY

## 2018-10-27 PROCEDURE — 2580000003 HC RX 258: Performed by: TRANSPLANT SURGERY

## 2018-10-27 PROCEDURE — 99239 HOSP IP/OBS DSCHRG MGMT >30: CPT | Performed by: INTERNAL MEDICINE

## 2018-10-27 PROCEDURE — S0028 INJECTION, FAMOTIDINE, 20 MG: HCPCS | Performed by: STUDENT IN AN ORGANIZED HEALTH CARE EDUCATION/TRAINING PROGRAM

## 2018-10-27 RX ADMIN — PANCRELIPASE 1 CAPSULE: 60000; 12000; 38000 CAPSULE, DELAYED RELEASE PELLETS ORAL at 11:21

## 2018-10-27 RX ADMIN — ENOXAPARIN SODIUM 40 MG: 40 INJECTION SUBCUTANEOUS at 09:00

## 2018-10-27 RX ADMIN — FAMOTIDINE 20 MG: 10 INJECTION, SOLUTION INTRAVENOUS at 09:04

## 2018-10-27 RX ADMIN — INSULIN LISPRO 1 UNITS: 100 INJECTION, SOLUTION INTRAVENOUS; SUBCUTANEOUS at 11:32

## 2018-10-27 RX ADMIN — DOCUSATE SODIUM 100 MG: 100 CAPSULE, LIQUID FILLED ORAL at 09:00

## 2018-10-27 RX ADMIN — LOSARTAN POTASSIUM 100 MG: 50 TABLET, FILM COATED ORAL at 09:00

## 2018-10-27 RX ADMIN — PANCRELIPASE 1 CAPSULE: 60000; 12000; 38000 CAPSULE, DELAYED RELEASE PELLETS ORAL at 09:01

## 2018-10-27 RX ADMIN — Medication 10 ML: at 06:16

## 2018-10-27 RX ADMIN — POLYETHYLENE GLYCOL 3350 17 G: 17 POWDER, FOR SOLUTION ORAL at 06:22

## 2018-10-27 RX ADMIN — ISOSORBIDE MONONITRATE 30 MG: 30 TABLET, EXTENDED RELEASE ORAL at 09:00

## 2018-10-27 RX ADMIN — SODIUM CHLORIDE, POTASSIUM CHLORIDE, SODIUM LACTATE AND CALCIUM CHLORIDE: 600; 310; 30; 20 INJECTION, SOLUTION INTRAVENOUS at 06:17

## 2018-10-27 ASSESSMENT — PAIN SCALES - GENERAL
PAINLEVEL_OUTOF10: 0

## 2018-10-28 ENCOUNTER — CARE COORDINATION (OUTPATIENT)
Dept: CASE MANAGEMENT | Age: 78
End: 2018-10-28

## 2018-10-28 DIAGNOSIS — K85.90 PANCREATITIS, UNSPECIFIED PANCREATITIS TYPE: Primary | Chronic | ICD-10-CM

## 2018-10-29 ENCOUNTER — OFFICE VISIT (OUTPATIENT)
Dept: PRIMARY CARE CLINIC | Age: 78
End: 2018-10-29
Payer: MEDICARE

## 2018-10-29 VITALS
WEIGHT: 155 LBS | TEMPERATURE: 97.3 F | SYSTOLIC BLOOD PRESSURE: 138 MMHG | DIASTOLIC BLOOD PRESSURE: 88 MMHG | BODY MASS INDEX: 24.28 KG/M2 | HEART RATE: 78 BPM | OXYGEN SATURATION: 97 %

## 2018-10-29 DIAGNOSIS — K86.1 ACUTE ON CHRONIC PANCREATITIS (HCC): ICD-10-CM

## 2018-10-29 DIAGNOSIS — K85.90 ACUTE ON CHRONIC PANCREATITIS (HCC): ICD-10-CM

## 2018-10-29 DIAGNOSIS — E11.9 TYPE 2 DIABETES MELLITUS WITHOUT COMPLICATION, WITHOUT LONG-TERM CURRENT USE OF INSULIN (HCC): Chronic | ICD-10-CM

## 2018-10-29 DIAGNOSIS — K85.90 ACUTE PANCREATITIS WITHOUT INFECTION OR NECROSIS, UNSPECIFIED PANCREATITIS TYPE: Primary | ICD-10-CM

## 2018-10-29 LAB — HBA1C MFR BLD: 6.1 %

## 2018-10-29 PROCEDURE — 1111F DSCHRG MED/CURRENT MED MERGE: CPT | Performed by: PHYSICIAN ASSISTANT

## 2018-10-29 PROCEDURE — 99495 TRANSJ CARE MGMT MOD F2F 14D: CPT | Performed by: PHYSICIAN ASSISTANT

## 2018-10-29 PROCEDURE — 83036 HEMOGLOBIN GLYCOSYLATED A1C: CPT | Performed by: PHYSICIAN ASSISTANT

## 2018-10-29 ASSESSMENT — PATIENT HEALTH QUESTIONNAIRE - PHQ9
SUM OF ALL RESPONSES TO PHQ9 QUESTIONS 1 & 2: 0
SUM OF ALL RESPONSES TO PHQ QUESTIONS 1-9: 0
1. LITTLE INTEREST OR PLEASURE IN DOING THINGS: 0
SUM OF ALL RESPONSES TO PHQ QUESTIONS 1-9: 0
2. FEELING DOWN, DEPRESSED OR HOPELESS: 0

## 2018-10-29 NOTE — PROGRESS NOTES
Exam:  General Appearance: alert and oriented to person, place and time, well-developed and well-nourished, in no acute distress  Skin: warm and dry, no rash or erythema  Head: normocephalic and atraumatic  Eyes: pupils equal, round, and reactive to light, extraocular eye movements intact, conjunctivae normal  ENT: tympanic membrane, external ear and ear canal normal bilaterally, oropharynx clear and moist with normal mucous membranes  Neck: neck supple and with no tenderness, no thyromegaly or thyroid nodules, no cervical lymphadenopathy   Pulmonary/Chest: clear to auscultation bilaterally- no wheezes, rales or rhonchi, normal air movement, no respiratory distress  Abdomen: soft, non-distended, there is some mild ttp in the epigastrium, otherwise nontender throughout. Cardiovascular: normal rate, normal S1 and S2, no gallops, intact distal pulses, no carotid bruits and no JVD  Extremities: no cyanosis, no clubbing and no edema  Neurologic: gait and coordination normal and speech normal    Assessment/Plan:  1. Acute Pancreatitis without infection or necrosis  2. Acute on chornic pancreatitis  For above, pt is stbale at this time, but will likely need another ESLW to see if stone can be lithotripsied otherwise pt may need open surgery. Will await Dr. Refugio Siemens and Dr. Claudio Dorsey recommendations. To FU with them as advised to do so. He will. To in meantime watch diet, but to add protien, continue the Glucerna shakes and I would say take at least BID right now, I want to see him back in 2 months for a check on his nutrition satus and likely after his second ESLW, he will do so. T2DM, A1c is 6.1 % he is doing well on the Metformin now and off the Januvia(since his pancreatitis bouts). Cont metformin same.      Medical Decision Making: moderate complexity

## 2018-11-02 ENCOUNTER — CARE COORDINATION (OUTPATIENT)
Dept: CARE COORDINATION | Age: 78
End: 2018-11-02

## 2018-11-03 ENCOUNTER — HOSPITAL ENCOUNTER (OUTPATIENT)
Age: 78
Discharge: HOME | End: 2018-11-03

## 2018-11-03 PROCEDURE — 9900000038 HC CARDIAC REHAB PHASE 3 - MONTHLY

## 2018-11-05 ENCOUNTER — CARE COORDINATION (OUTPATIENT)
Dept: CASE MANAGEMENT | Age: 78
End: 2018-11-05

## 2018-11-05 NOTE — CARE COORDINATION
[facility] Transitions Follow Up Call    2018    Patient: Blanche Snell  Patient : 1940   MRN: [id number]  Reason for Admission: acute pancreatitis   Discharge Date: 10/27/18 RARS: Readmission Risk Score: 11 CMRS 6     Spoke with: [facility] Transitions Subsequent and Final Call    Subsequent and Final Calls  Do you have any ongoing symptoms?:  No  Have your medications changed?:  No  Do you have any questions related to your medications?:  No  Do you currently have any active services?:  No  Do you have any needs or concerns that I can assist you with?:  No  Identified Barriers:  None  Care Transitions Interventions  No Identified Needs  Other Interventions:        Spoke with Deborah Carranza for follow up care transition call. He reports that he is doing \"ok\". He reports he had diarrhea following eating eggs for a couple of days but after cutting out the eggs the diarrhea has resolved. He stated he is making milkshakes with whey protein. His weight last week was 152lbs and today was 147lbs. He is scheduled to see Dr. Ran Lyles 18 and then he will see Dr. Jennifer Butler at [facility] for stent removal after that. Deborahjames Downingkeegan denies any needs, questions, or concerns at this time.      Follow Up  Future Appointments  Date Time Provider Tami Casas   2018 1:15 PM Abram Barron MD [facility]       Angela Jay RN

## 2018-11-08 ENCOUNTER — OFFICE VISIT (OUTPATIENT)
Dept: HEMATOLOGY | Age: 78
End: 2018-11-08
Payer: MEDICARE

## 2018-11-08 VITALS
RESPIRATION RATE: 14 BRPM | HEART RATE: 79 BPM | BODY MASS INDEX: 23.56 KG/M2 | DIASTOLIC BLOOD PRESSURE: 82 MMHG | WEIGHT: 150.1 LBS | SYSTOLIC BLOOD PRESSURE: 108 MMHG | TEMPERATURE: 97.9 F | HEIGHT: 67 IN

## 2018-11-08 DIAGNOSIS — K86.1 ACUTE ON CHRONIC PANCREATITIS (HCC): Primary | ICD-10-CM

## 2018-11-08 DIAGNOSIS — K85.90 ACUTE ON CHRONIC PANCREATITIS (HCC): Primary | ICD-10-CM

## 2018-11-08 PROCEDURE — 4040F PNEUMOC VAC/ADMIN/RCVD: CPT | Performed by: TRANSPLANT SURGERY

## 2018-11-08 PROCEDURE — 1111F DSCHRG MED/CURRENT MED MERGE: CPT | Performed by: TRANSPLANT SURGERY

## 2018-11-08 PROCEDURE — 99212 OFFICE O/P EST SF 10 MIN: CPT | Performed by: TRANSPLANT SURGERY

## 2018-11-08 PROCEDURE — 1101F PT FALLS ASSESS-DOCD LE1/YR: CPT | Performed by: TRANSPLANT SURGERY

## 2018-11-08 PROCEDURE — G8484 FLU IMMUNIZE NO ADMIN: HCPCS | Performed by: TRANSPLANT SURGERY

## 2018-11-08 PROCEDURE — G8420 CALC BMI NORM PARAMETERS: HCPCS | Performed by: TRANSPLANT SURGERY

## 2018-11-08 PROCEDURE — 1036F TOBACCO NON-USER: CPT | Performed by: TRANSPLANT SURGERY

## 2018-11-08 PROCEDURE — G8598 ASA/ANTIPLAT THER USED: HCPCS | Performed by: TRANSPLANT SURGERY

## 2018-11-08 PROCEDURE — 1123F ACP DISCUSS/DSCN MKR DOCD: CPT | Performed by: TRANSPLANT SURGERY

## 2018-11-08 PROCEDURE — G8427 DOCREV CUR MEDS BY ELIG CLIN: HCPCS | Performed by: TRANSPLANT SURGERY

## 2018-11-09 ENCOUNTER — CARE COORDINATION (OUTPATIENT)
Dept: CARE COORDINATION | Age: 78
End: 2018-11-09

## 2018-11-19 RX ORDER — FENOFIBRATE 134 MG/1
CAPSULE ORAL
Qty: 90 CAPSULE | Refills: 1 | Status: SHIPPED | OUTPATIENT
Start: 2018-11-19 | End: 2019-03-25 | Stop reason: SDUPTHER

## 2018-11-19 RX ORDER — ISOSORBIDE MONONITRATE 30 MG/1
TABLET, EXTENDED RELEASE ORAL
Qty: 90 TABLET | Refills: 1 | Status: SHIPPED | OUTPATIENT
Start: 2018-11-19 | End: 2019-04-08 | Stop reason: SDUPTHER

## 2018-12-04 ENCOUNTER — HOSPITAL ENCOUNTER (OUTPATIENT)
Age: 78
Discharge: HOME | End: 2018-12-04

## 2018-12-04 PROCEDURE — 9900000038 HC CARDIAC REHAB PHASE 3 - MONTHLY

## 2018-12-21 ENCOUNTER — APPOINTMENT (OUTPATIENT)
Dept: GENERAL RADIOLOGY | Age: 78
DRG: 871 | End: 2018-12-21
Payer: MEDICARE

## 2018-12-21 ENCOUNTER — APPOINTMENT (OUTPATIENT)
Dept: CT IMAGING | Age: 78
DRG: 871 | End: 2018-12-21
Payer: MEDICARE

## 2018-12-21 ENCOUNTER — HOSPITAL ENCOUNTER (INPATIENT)
Age: 78
LOS: 2 days | Discharge: HOME | DRG: 871 | End: 2018-12-24
Attending: EMERGENCY MEDICINE | Admitting: INTERNAL MEDICINE
Payer: MEDICARE

## 2018-12-21 DIAGNOSIS — K85.90 ACUTE PANCREATITIS, UNSPECIFIED COMPLICATION STATUS, UNSPECIFIED PANCREATITIS TYPE: Primary | ICD-10-CM

## 2018-12-21 LAB
ALBUMIN SERPL-MCNC: 3.9 G/DL (ref 3.5–5.2)
ALP BLD-CCNC: 54 U/L (ref 40–129)
ALT SERPL-CCNC: 63 U/L (ref 0–40)
ANION GAP SERPL CALCULATED.3IONS-SCNC: 15 MMOL/L (ref 7–16)
AST SERPL-CCNC: 66 U/L (ref 0–39)
BASOPHILS ABSOLUTE: 0.01 E9/L (ref 0–0.2)
BASOPHILS RELATIVE PERCENT: 0.2 % (ref 0–2)
BILIRUB SERPL-MCNC: 0.7 MG/DL (ref 0–1.2)
BILIRUBIN DIRECT: <0.2 MG/DL (ref 0–0.3)
BILIRUBIN URINE: NEGATIVE
BILIRUBIN, INDIRECT: ABNORMAL MG/DL (ref 0–1)
BLOOD, URINE: NEGATIVE
BUN BLDV-MCNC: 28 MG/DL (ref 8–23)
CALCIUM SERPL-MCNC: 8.7 MG/DL (ref 8.6–10.2)
CHLORIDE BLD-SCNC: 100 MMOL/L (ref 98–107)
CLARITY: CLEAR
CO2: 21 MMOL/L (ref 22–29)
COLOR: YELLOW
CREAT SERPL-MCNC: 1.3 MG/DL (ref 0.7–1.2)
EOSINOPHILS ABSOLUTE: 0.02 E9/L (ref 0.05–0.5)
EOSINOPHILS RELATIVE PERCENT: 0.4 % (ref 0–6)
GFR AFRICAN AMERICAN: >60
GFR NON-AFRICAN AMERICAN: 53 ML/MIN/1.73
GLUCOSE BLD-MCNC: 203 MG/DL (ref 74–99)
GLUCOSE URINE: NEGATIVE MG/DL
HCT VFR BLD CALC: 40.4 % (ref 37–54)
HEMOGLOBIN: 13.3 G/DL (ref 12.5–16.5)
IMMATURE GRANULOCYTES #: 0.01 E9/L
IMMATURE GRANULOCYTES %: 0.2 % (ref 0–5)
KETONES, URINE: NEGATIVE MG/DL
LACTIC ACID: 1.4 MMOL/L (ref 0.5–2.2)
LEUKOCYTE ESTERASE, URINE: NEGATIVE
LIPASE: 475 U/L (ref 13–60)
LYMPHOCYTES ABSOLUTE: 0.13 E9/L (ref 1.5–4)
LYMPHOCYTES RELATIVE PERCENT: 2.6 % (ref 20–42)
MCH RBC QN AUTO: 31.1 PG (ref 26–35)
MCHC RBC AUTO-ENTMCNC: 32.9 % (ref 32–34.5)
MCV RBC AUTO: 94.4 FL (ref 80–99.9)
MONOCYTES ABSOLUTE: 0.24 E9/L (ref 0.1–0.95)
MONOCYTES RELATIVE PERCENT: 4.7 % (ref 2–12)
NEUTROPHILS ABSOLUTE: 4.66 E9/L (ref 1.8–7.3)
NEUTROPHILS RELATIVE PERCENT: 91.9 % (ref 43–80)
NITRITE, URINE: NEGATIVE
PDW BLD-RTO: 14.4 FL (ref 11.5–15)
PH UA: 6 (ref 5–9)
PLATELET # BLD: 156 E9/L (ref 130–450)
PMV BLD AUTO: 9.9 FL (ref 7–12)
POTASSIUM SERPL-SCNC: 4.7 MMOL/L (ref 3.5–5)
PROTEIN UA: NEGATIVE MG/DL
RBC # BLD: 4.28 E12/L (ref 3.8–5.8)
RBC # BLD: NORMAL 10*6/UL
SODIUM BLD-SCNC: 136 MMOL/L (ref 132–146)
SPECIFIC GRAVITY UA: 1.01 (ref 1–1.03)
TOTAL PROTEIN: 6.1 G/DL (ref 6.4–8.3)
UROBILINOGEN, URINE: 0.2 E.U./DL
WBC # BLD: 5.1 E9/L (ref 4.5–11.5)

## 2018-12-21 PROCEDURE — 71045 X-RAY EXAM CHEST 1 VIEW: CPT

## 2018-12-21 PROCEDURE — 2580000003 HC RX 258: Performed by: EMERGENCY MEDICINE

## 2018-12-21 PROCEDURE — 83605 ASSAY OF LACTIC ACID: CPT

## 2018-12-21 PROCEDURE — 74177 CT ABD & PELVIS W/CONTRAST: CPT

## 2018-12-21 PROCEDURE — 6370000000 HC RX 637 (ALT 250 FOR IP): Performed by: EMERGENCY MEDICINE

## 2018-12-21 PROCEDURE — 87040 BLOOD CULTURE FOR BACTERIA: CPT

## 2018-12-21 PROCEDURE — 80048 BASIC METABOLIC PNL TOTAL CA: CPT

## 2018-12-21 PROCEDURE — 81003 URINALYSIS AUTO W/O SCOPE: CPT

## 2018-12-21 PROCEDURE — G0378 HOSPITAL OBSERVATION PER HR: HCPCS

## 2018-12-21 PROCEDURE — 87186 SC STD MICRODIL/AGAR DIL: CPT

## 2018-12-21 PROCEDURE — 87077 CULTURE AEROBIC IDENTIFY: CPT

## 2018-12-21 PROCEDURE — 99285 EMERGENCY DEPT VISIT HI MDM: CPT

## 2018-12-21 PROCEDURE — 85025 COMPLETE CBC W/AUTO DIFF WBC: CPT

## 2018-12-21 PROCEDURE — 80076 HEPATIC FUNCTION PANEL: CPT

## 2018-12-21 PROCEDURE — 6360000004 HC RX CONTRAST MEDICATION: Performed by: RADIOLOGY

## 2018-12-21 PROCEDURE — 83690 ASSAY OF LIPASE: CPT

## 2018-12-21 PROCEDURE — 87088 URINE BACTERIA CULTURE: CPT

## 2018-12-21 RX ORDER — SODIUM CHLORIDE, SODIUM LACTATE, POTASSIUM CHLORIDE, AND CALCIUM CHLORIDE .6; .31; .03; .02 G/100ML; G/100ML; G/100ML; G/100ML
1000 INJECTION, SOLUTION INTRAVENOUS ONCE
Status: COMPLETED | OUTPATIENT
Start: 2018-12-21 | End: 2018-12-22

## 2018-12-21 RX ORDER — ACETAMINOPHEN 325 MG/1
650 TABLET ORAL ONCE
Status: COMPLETED | OUTPATIENT
Start: 2018-12-21 | End: 2018-12-21

## 2018-12-21 RX ORDER — ACETAMINOPHEN 650 MG/1
650 SUPPOSITORY RECTAL ONCE
Status: DISCONTINUED | OUTPATIENT
Start: 2018-12-21 | End: 2018-12-21

## 2018-12-21 RX ADMIN — IOPAMIDOL 110 ML: 755 INJECTION, SOLUTION INTRAVENOUS at 19:33

## 2018-12-21 RX ADMIN — ACETAMINOPHEN 650 MG: 325 TABLET ORAL at 18:36

## 2018-12-21 RX ADMIN — SODIUM CHLORIDE, POTASSIUM CHLORIDE, SODIUM LACTATE AND CALCIUM CHLORIDE 1000 ML: 600; 310; 30; 20 INJECTION, SOLUTION INTRAVENOUS at 23:00

## 2018-12-21 ASSESSMENT — ENCOUNTER SYMPTOMS
DIARRHEA: 0
SORE THROAT: 0
SINUS PRESSURE: 0
WHEEZING: 0
VOMITING: 1
EYE REDNESS: 0
BACK PAIN: 0
ABDOMINAL PAIN: 1
EYE PAIN: 0
CONSTIPATION: 0
COUGH: 0
NAUSEA: 1
BELCHING: 0
SHORTNESS OF BREATH: 0
EYE DISCHARGE: 0

## 2018-12-21 ASSESSMENT — PAIN SCALES - GENERAL
PAINLEVEL_OUTOF10: 1
PAINLEVEL_OUTOF10: 0

## 2018-12-21 ASSESSMENT — PAIN DESCRIPTION - PROGRESSION: CLINICAL_PROGRESSION: GRADUALLY IMPROVING

## 2018-12-21 NOTE — ED PROVIDER NOTES
Negative for adenopathy. All other systems reviewed and are negative. Physical Exam   Constitutional: He appears well-developed and well-nourished. He appears distressed. HENT:   Head: Normocephalic and atraumatic. Eyes: Pupils are equal, round, and reactive to light. EOM are normal.   Neck: Normal range of motion. Neck supple. Cardiovascular: Normal rate and regular rhythm. Pulmonary/Chest: Effort normal and breath sounds normal.   Abdominal: Soft. Normal appearance. There is hepatomegaly. There is tenderness in the right upper quadrant. Procedures    MDM  --------------------------------------------- PAST HISTORY ---------------------------------------------  Past Medical History:  has a past medical history of Biliary stone; CAD (coronary artery disease); Carotid artery stenosis; Esophageal stricture; GERD (gastroesophageal reflux disease); Hyperlipidemia; Hypertension; Pancreatic stent migration; Pancreatic stones; and Type 2 diabetes mellitus without complication ([facility] [place] [address] Past Surgical History:  has a past surgical history that includes Diagnostic Cardiac Cath Lab Procedure (2002); Coronary artery bypass graft (2002); hernia repair (2003); and pr lap,cholecystectomy (N/A, 9/6/2018). Social History:  reports that he quit smoking about 20 years ago. His smoking use included Cigars. He started smoking about 59 years ago. He smoked 0.00 packs per day for 0.00 years. He has never used smokeless tobacco. He reports that he does not drink alcohol or use drugs. Family History: family history includes Cancer in his maternal grandfather; Heart Disease in his father and mother; High Blood Pressure in his father and mother. The patients home medications have been reviewed.     Allergies: Pcn [penicillins] and Percocet [oxycodone-acetaminophen]    -------------------------------------------------- RESULTS -------------------------------------------------    LABS:  Results for orders placed or performed during the hospital encounter of 12/21/18   CBC Auto Differential   Result Value Ref Range    WBC 5.1 4.5 - 11.5 E9/L    RBC 4.28 3.80 - 5.80 E12/L    Hemoglobin 13.3 12.5 - 16.5 g/dL    Hematocrit 40.4 37.0 - 54.0 %    MCV 94.4 80.0 - 99.9 fL    MCH 31.1 26.0 - 35.0 pg    MCHC 32.9 32.0 - 34.5 %    RDW 14.4 11.5 - 15.0 fL    Platelets 793 770 - 188 E9/L    MPV 9.9 7.0 - 12.0 fL    Neutrophils % 91.9 (H) 43.0 - 80.0 %    Immature Granulocytes % 0.2 0.0 - 5.0 %    Lymphocytes % 2.6 (L) 20.0 - 42.0 %    Monocytes % 4.7 2.0 - 12.0 %    Eosinophils % 0.4 0.0 - 6.0 %    Basophils % 0.2 0.0 - 2.0 %    Neutrophils # 4.66 1.80 - 7.30 E9/L    Immature Granulocytes # 0.01 E9/L    Lymphocytes # 0.13 (L) 1.50 - 4.00 E9/L    Monocytes # 0.24 0.10 - 0.95 E9/L    Eosinophils # 0.02 (L) 0.05 - 0.50 E9/L    Basophils # 0.01 0.00 - 0.20 E9/L    RBC Morphology Normal    Hepatic Function Panel   Result Value Ref Range    Total Protein 6.1 (L) 6.4 - 8.3 g/dL    Alb 3.9 3.5 - 5.2 g/dL    Alkaline Phosphatase 54 40 - 129 U/L    ALT 63 (H) 0 - 40 U/L    AST 66 (H) 0 - 39 U/L    Total Bilirubin 0.7 0.0 - 1.2 mg/dL    Bilirubin, Direct <0.2 0.0 - 0.3 mg/dL    Bilirubin, Indirect see below 0.0 - 1.0 mg/dL   Basic Metabolic Panel   Result Value Ref Range    Sodium 136 132 - 146 mmol/L    Potassium 4.7 3.5 - 5.0 mmol/L    Chloride 100 98 - 107 mmol/L    CO2 21 (L) 22 - 29 mmol/L    Anion Gap 15 7 - 16 mmol/L    Glucose 203 (H) 74 - 99 mg/dL    BUN 28 (H) 8 - 23 mg/dL    CREATININE 1.3 (H) 0.7 - 1.2 mg/dL    GFR Non-African American 53 >=60 mL/min/1.73    GFR African American >60     Calcium 8.7 8.6 - 10.2 mg/dL   Lactic Acid, Plasma   Result Value Ref Range    Lactic Acid 1.4 0.5 - 2.2 mmol/L   Lipase   Result Value Ref Range    Lipase 475 (H) 13 - 60 U/L   Urinalysis   Result Value Ref Range    Color, UA Yellow Straw/Yellow    Clarity, UA Clear Clear    Glucose, Ur Negative Negative mg/dL    Bilirubin Urine Negative Negative admission.    --------------------------------- ADDITIONAL PROVIDER NOTES ---------------------------------  This patient's ED course included: a personal history and physicial examination, re-evaluation prior to disposition, multiple bedside re-evaluations, IV medications, cardiac monitoring and continuous pulse oximetry    This patient has remained hemodynamically stable during their ED course. Diagnosis:  1. Acute pancreatitis, unspecified complication status, unspecified pancreatitis type        Disposition:  Patient's disposition: Admit to med/surg floor  Patient's condition is stable.              Rambo Beltran DO  Resident  12/21/18 1160

## 2018-12-22 ENCOUNTER — APPOINTMENT (OUTPATIENT)
Dept: GENERAL RADIOLOGY | Age: 78
DRG: 871 | End: 2018-12-22
Payer: MEDICARE

## 2018-12-22 PROBLEM — K86.89 PANCREATIC DUCT STONES: Chronic | Status: ACTIVE | Noted: 2018-10-25

## 2018-12-22 PROBLEM — R78.81 BACTEREMIA: Status: ACTIVE | Noted: 2018-12-22

## 2018-12-22 LAB
ALBUMIN SERPL-MCNC: 3.5 G/DL (ref 3.5–5.2)
ALP BLD-CCNC: 47 U/L (ref 40–129)
ALT SERPL-CCNC: 48 U/L (ref 0–40)
ANION GAP SERPL CALCULATED.3IONS-SCNC: 13 MMOL/L (ref 7–16)
AST SERPL-CCNC: 41 U/L (ref 0–39)
BASOPHILS ABSOLUTE: 0.01 E9/L (ref 0–0.2)
BASOPHILS RELATIVE PERCENT: 0.2 % (ref 0–2)
BILIRUB SERPL-MCNC: 0.7 MG/DL (ref 0–1.2)
BUN BLDV-MCNC: 21 MG/DL (ref 8–23)
CALCIUM SERPL-MCNC: 8.3 MG/DL (ref 8.6–10.2)
CHLORIDE BLD-SCNC: 100 MMOL/L (ref 98–107)
CO2: 22 MMOL/L (ref 22–29)
CREAT SERPL-MCNC: 1.1 MG/DL (ref 0.7–1.2)
EOSINOPHILS ABSOLUTE: 0 E9/L (ref 0.05–0.5)
EOSINOPHILS RELATIVE PERCENT: 0 % (ref 0–6)
GFR AFRICAN AMERICAN: >60
GFR NON-AFRICAN AMERICAN: >60 ML/MIN/1.73
GLUCOSE BLD-MCNC: 151 MG/DL (ref 74–99)
HCT VFR BLD CALC: 38.2 % (ref 37–54)
HEMOGLOBIN: 12.8 G/DL (ref 12.5–16.5)
IMMATURE GRANULOCYTES #: 0.01 E9/L
IMMATURE GRANULOCYTES %: 0.2 % (ref 0–5)
LACTIC ACID: 3.2 MMOL/L (ref 0.5–2.2)
LIPASE: 408 U/L (ref 13–60)
LYMPHOCYTES ABSOLUTE: 0.18 E9/L (ref 1.5–4)
LYMPHOCYTES RELATIVE PERCENT: 3.9 % (ref 20–42)
MCH RBC QN AUTO: 31.3 PG (ref 26–35)
MCHC RBC AUTO-ENTMCNC: 33.5 % (ref 32–34.5)
MCV RBC AUTO: 93.4 FL (ref 80–99.9)
METER GLUCOSE: 115 MG/DL (ref 74–99)
METER GLUCOSE: 124 MG/DL (ref 74–99)
METER GLUCOSE: 133 MG/DL (ref 74–99)
METER GLUCOSE: 136 MG/DL (ref 74–99)
METER GLUCOSE: 152 MG/DL (ref 74–99)
MONOCYTES ABSOLUTE: 0.31 E9/L (ref 0.1–0.95)
MONOCYTES RELATIVE PERCENT: 6.8 % (ref 2–12)
NEUTROPHILS ABSOLUTE: 4.08 E9/L (ref 1.8–7.3)
NEUTROPHILS RELATIVE PERCENT: 88.9 % (ref 43–80)
PDW BLD-RTO: 14.4 FL (ref 11.5–15)
PLATELET # BLD: 140 E9/L (ref 130–450)
PMV BLD AUTO: 9.5 FL (ref 7–12)
POTASSIUM REFLEX MAGNESIUM: 3.6 MMOL/L (ref 3.5–5)
RBC # BLD: 4.09 E12/L (ref 3.8–5.8)
SODIUM BLD-SCNC: 135 MMOL/L (ref 132–146)
TOTAL PROTEIN: 5.5 G/DL (ref 6.4–8.3)
WBC # BLD: 4.6 E9/L (ref 4.5–11.5)

## 2018-12-22 PROCEDURE — 6370000000 HC RX 637 (ALT 250 FOR IP): Performed by: INTERNAL MEDICINE

## 2018-12-22 PROCEDURE — 2580000003 HC RX 258: Performed by: INTERNAL MEDICINE

## 2018-12-22 PROCEDURE — 2500000003 HC RX 250 WO HCPCS: Performed by: INTERNAL MEDICINE

## 2018-12-22 PROCEDURE — 2700000000 HC OXYGEN THERAPY PER DAY

## 2018-12-22 PROCEDURE — 6370000000 HC RX 637 (ALT 250 FOR IP): Performed by: EMERGENCY MEDICINE

## 2018-12-22 PROCEDURE — 83605 ASSAY OF LACTIC ACID: CPT

## 2018-12-22 PROCEDURE — 99222 1ST HOSP IP/OBS MODERATE 55: CPT | Performed by: SURGERY

## 2018-12-22 PROCEDURE — 87040 BLOOD CULTURE FOR BACTERIA: CPT

## 2018-12-22 PROCEDURE — 71045 X-RAY EXAM CHEST 1 VIEW: CPT

## 2018-12-22 PROCEDURE — 96372 THER/PROPH/DIAG INJ SC/IM: CPT

## 2018-12-22 PROCEDURE — 96374 THER/PROPH/DIAG INJ IV PUSH: CPT

## 2018-12-22 PROCEDURE — 6360000002 HC RX W HCPCS: Performed by: EMERGENCY MEDICINE

## 2018-12-22 PROCEDURE — 96361 HYDRATE IV INFUSION ADD-ON: CPT

## 2018-12-22 PROCEDURE — 96376 TX/PRO/DX INJ SAME DRUG ADON: CPT

## 2018-12-22 PROCEDURE — 1200000000 HC SEMI PRIVATE

## 2018-12-22 PROCEDURE — 80053 COMPREHEN METABOLIC PANEL: CPT

## 2018-12-22 PROCEDURE — 99223 1ST HOSP IP/OBS HIGH 75: CPT | Performed by: INTERNAL MEDICINE

## 2018-12-22 PROCEDURE — 83690 ASSAY OF LIPASE: CPT

## 2018-12-22 PROCEDURE — 85025 COMPLETE CBC W/AUTO DIFF WBC: CPT

## 2018-12-22 PROCEDURE — APPSS45 APP SPLIT SHARED TIME 31-45 MINUTES: Performed by: NURSE PRACTITIONER

## 2018-12-22 PROCEDURE — 36415 COLL VENOUS BLD VENIPUNCTURE: CPT

## 2018-12-22 PROCEDURE — 82962 GLUCOSE BLOOD TEST: CPT

## 2018-12-22 PROCEDURE — 6360000002 HC RX W HCPCS: Performed by: INTERNAL MEDICINE

## 2018-12-22 RX ORDER — ONDANSETRON 2 MG/ML
4 INJECTION INTRAMUSCULAR; INTRAVENOUS EVERY 6 HOURS PRN
Status: DISCONTINUED | OUTPATIENT
Start: 2018-12-22 | End: 2018-12-24 | Stop reason: HOSPADM

## 2018-12-22 RX ORDER — ISOSORBIDE MONONITRATE 30 MG/1
30 TABLET, EXTENDED RELEASE ORAL DAILY
Status: DISCONTINUED | OUTPATIENT
Start: 2018-12-22 | End: 2018-12-24 | Stop reason: HOSPADM

## 2018-12-22 RX ORDER — SODIUM CHLORIDE 0.9 % (FLUSH) 0.9 %
10 SYRINGE (ML) INJECTION PRN
Status: DISCONTINUED | OUTPATIENT
Start: 2018-12-22 | End: 2018-12-22 | Stop reason: SDUPTHER

## 2018-12-22 RX ORDER — SODIUM CHLORIDE 0.9 % (FLUSH) 0.9 %
10 SYRINGE (ML) INJECTION EVERY 12 HOURS SCHEDULED
Status: DISCONTINUED | OUTPATIENT
Start: 2018-12-22 | End: 2018-12-22 | Stop reason: SDUPTHER

## 2018-12-22 RX ORDER — ACETAMINOPHEN 325 MG/1
650 TABLET ORAL EVERY 4 HOURS PRN
Status: DISCONTINUED | OUTPATIENT
Start: 2018-12-22 | End: 2018-12-24 | Stop reason: HOSPADM

## 2018-12-22 RX ORDER — SODIUM CHLORIDE 0.9 % (FLUSH) 0.9 %
10 SYRINGE (ML) INJECTION PRN
Status: DISCONTINUED | OUTPATIENT
Start: 2018-12-22 | End: 2018-12-24 | Stop reason: HOSPADM

## 2018-12-22 RX ORDER — SODIUM CHLORIDE 0.9 % (FLUSH) 0.9 %
10 SYRINGE (ML) INJECTION EVERY 12 HOURS SCHEDULED
Status: DISCONTINUED | OUTPATIENT
Start: 2018-12-22 | End: 2018-12-24 | Stop reason: HOSPADM

## 2018-12-22 RX ORDER — LEVOFLOXACIN 5 MG/ML
750 INJECTION, SOLUTION INTRAVENOUS ONCE
Status: DISCONTINUED | OUTPATIENT
Start: 2018-12-22 | End: 2018-12-22

## 2018-12-22 RX ORDER — MORPHINE SULFATE 2 MG/ML
1 INJECTION, SOLUTION INTRAMUSCULAR; INTRAVENOUS EVERY 4 HOURS PRN
Status: DISCONTINUED | OUTPATIENT
Start: 2018-12-22 | End: 2018-12-24 | Stop reason: HOSPADM

## 2018-12-22 RX ORDER — SODIUM CHLORIDE 9 MG/ML
INJECTION, SOLUTION INTRAVENOUS CONTINUOUS
Status: DISCONTINUED | OUTPATIENT
Start: 2018-12-22 | End: 2018-12-23

## 2018-12-22 RX ORDER — 0.9 % SODIUM CHLORIDE 0.9 %
500 INTRAVENOUS SOLUTION INTRAVENOUS ONCE
Status: COMPLETED | OUTPATIENT
Start: 2018-12-22 | End: 2018-12-22

## 2018-12-22 RX ORDER — FENOFIBRATE 160 MG/1
160 TABLET ORAL DAILY
Status: DISCONTINUED | OUTPATIENT
Start: 2018-12-22 | End: 2018-12-22

## 2018-12-22 RX ORDER — LOSARTAN POTASSIUM 50 MG/1
100 TABLET ORAL EVERY MORNING
Status: DISCONTINUED | OUTPATIENT
Start: 2018-12-22 | End: 2018-12-22

## 2018-12-22 RX ORDER — SIMVASTATIN 40 MG
40 TABLET ORAL NIGHTLY
Status: DISCONTINUED | OUTPATIENT
Start: 2018-12-22 | End: 2018-12-24 | Stop reason: HOSPADM

## 2018-12-22 RX ADMIN — METRONIDAZOLE 500 MG: 500 INJECTION, SOLUTION INTRAVENOUS at 13:03

## 2018-12-22 RX ADMIN — PANCRELIPASE 2 CAPSULE: 60000; 12000; 38000 CAPSULE, DELAYED RELEASE PELLETS ORAL at 17:20

## 2018-12-22 RX ADMIN — SODIUM CHLORIDE: 9 INJECTION, SOLUTION INTRAVENOUS at 04:33

## 2018-12-22 RX ADMIN — ENOXAPARIN SODIUM 40 MG: 40 INJECTION SUBCUTANEOUS at 11:32

## 2018-12-22 RX ADMIN — INSULIN LISPRO 2 UNITS: 100 INJECTION, SOLUTION INTRAVENOUS; SUBCUTANEOUS at 06:46

## 2018-12-22 RX ADMIN — SODIUM CHLORIDE 500 ML: 9 INJECTION, SOLUTION INTRAVENOUS at 14:43

## 2018-12-22 RX ADMIN — ACETAMINOPHEN 650 MG: 325 TABLET ORAL at 13:13

## 2018-12-22 RX ADMIN — PANCRELIPASE 2 CAPSULE: 60000; 12000; 38000 CAPSULE, DELAYED RELEASE PELLETS ORAL at 11:33

## 2018-12-22 RX ADMIN — METRONIDAZOLE 500 MG: 500 INJECTION, SOLUTION INTRAVENOUS at 20:20

## 2018-12-22 RX ADMIN — ONDANSETRON 4 MG: 2 INJECTION INTRAMUSCULAR; INTRAVENOUS at 12:49

## 2018-12-22 RX ADMIN — Medication 10 ML: at 12:49

## 2018-12-22 RX ADMIN — ISOSORBIDE MONONITRATE 30 MG: 30 TABLET, EXTENDED RELEASE ORAL at 11:34

## 2018-12-22 RX ADMIN — SIMVASTATIN 40 MG: 40 TABLET, FILM COATED ORAL at 20:21

## 2018-12-22 RX ADMIN — AZTREONAM 2 G: 2 INJECTION, POWDER, LYOPHILIZED, FOR SOLUTION INTRAMUSCULAR; INTRAVENOUS at 22:21

## 2018-12-22 RX ADMIN — SODIUM CHLORIDE: 9 INJECTION, SOLUTION INTRAVENOUS at 20:20

## 2018-12-22 RX ADMIN — ONDANSETRON 4 MG: 2 INJECTION INTRAMUSCULAR; INTRAVENOUS at 04:33

## 2018-12-22 RX ADMIN — AZTREONAM 2 G: 2 INJECTION, POWDER, LYOPHILIZED, FOR SOLUTION INTRAMUSCULAR; INTRAVENOUS at 14:46

## 2018-12-22 ASSESSMENT — ENCOUNTER SYMPTOMS
PHOTOPHOBIA: 0
DIARRHEA: 0
VOMITING: 1
BACK PAIN: 0
SHORTNESS OF BREATH: 0
BLOOD IN STOOL: 0
ABDOMINAL PAIN: 1
COUGH: 0
SORE THROAT: 0
EYE REDNESS: 0
NAUSEA: 1
WHEEZING: 0
CONSTIPATION: 0

## 2018-12-22 ASSESSMENT — PAIN SCALES - GENERAL
PAINLEVEL_OUTOF10: 0

## 2018-12-22 NOTE — CONSULTS
MG tablet TAKE 1 TABLET EVERY DAY  Patient taking differently: TAKE 1 TABLET BY MOUTH NIGHTLY 9/17/18  Yes Randi Montes De Oca PA-C   ibuprofen (ADVIL;MOTRIN) 800 MG tablet Take 1 tablet by mouth every 8 hours  Patient taking differently: Take 800 mg by mouth every 8 hours as needed for Pain or Fever  9/6/18  Yes Chuy Saldaña III, MD   ranitidine (ZANTAC) 150 MG tablet Take 150 mg by mouth nightly   Yes Historical Provider, MD   blood glucose test strips (FREESTYLE LITE) strip 1 each by Other route daily As needed. 8/13/18  Yes Nguyen Nolasco DO   lipase-protease-amylase (CREON) 46566-54092 units delayed release capsule Take 1 capsule by mouth 3 times daily (with meals) 8/10/18  Yes Chuy Saldaña III, MD   losartan (COZAAR) 100 MG tablet Take 1 tablet by mouth daily  Patient taking differently: Take 100 mg by mouth every morning  6/5/18  Yes Good Aldridge PA-C   aspirin 81 MG EC tablet Take 81 mg by mouth daily    Yes Historical Provider, MD       Scheduled Meds:   enoxaparin  40 mg Subcutaneous Daily    isosorbide mononitrate  30 mg Oral Daily    lipase-protease-amylase  2 capsule Oral TID WC    simvastatin  40 mg Oral Nightly    insulin lispro  2-10 Units Subcutaneous Q6H    sodium chloride flush  10 mL Intravenous 2 times per day    aztreonam  2 g Intravenous Q8H    metroNIDAZOLE  500 mg Intravenous Q8H     ContinuousInfusions:   sodium chloride 50 mL/hr at 12/22/18 0433     PRN Meds:acetaminophen, morphine, sodium chloride flush, magnesium hydroxide, ondansetron    Allergies   Allergen Reactions    Pcn [Penicillins] Swelling    Percocet [Oxycodone-Acetaminophen] Nausea And Vomiting       Social History     Social History    Marital status:      Spouse name: N/A    Number of children: N/A    Years of education: N/A     Occupational History    Not on file.      Social History Main Topics    Smoking status: Former Smoker     Packs/day: 0.00     Years: 0.00     Types: Cigars     Start date: 1/1/1960     Quit date: 1/1/1998    Smokeless tobacco: Never Used    Alcohol use No    Drug use: No    Sexual activity: No     Other Topics Concern    Not on file     Social History Narrative    No narrative on file       Family History   Problem Relation Age of Onset    Heart Disease Mother     High Blood Pressure Mother     Heart Disease Father     High Blood Pressure Father     Cancer Maternal Grandfather        Review Of Systems:   Review of Systems   Constitutional: Negative for chills and fever. HENT: Negative for ear pain, nosebleeds, sore throat and tinnitus. Eyes: Negative for photophobia and redness. Respiratory: Negative for cough, shortness of breath and wheezing. Cardiovascular: Negative for chest pain and palpitations. Gastrointestinal: Positive for abdominal pain, nausea and vomiting. Negative for blood in stool, constipation and diarrhea. Endocrine: Negative for polydipsia. Genitourinary: Negative for dysuria, hematuria and urgency. Musculoskeletal: Negative for back pain and neck pain. Skin: Negative for rash. Neurological: Negative for dizziness, tremors and seizures. Hematological: Does not bruise/bleed easily. All other systems reviewed and are negative.        Physical Exam:  Vitals:    12/22/18 1212 12/22/18 1214 12/22/18 1256 12/22/18 1257   BP: (!) 178/90  (!) 170/82 (!) 170/82   Pulse: 112  102 102   Resp: 28 22 22   Temp: 97.9 °F (36.6 °C) 98.9 °F (37.2 °C) 100 °F (37.8 °C) 102.4 °F (39.1 °C)   TempSrc: Oral Axillary Oral Axillary   SpO2: 94% 96% 92% 91%   Weight:       Height:           General: Well nourished, well developed, no acute distress  Eyes:  PERRL   Conjunctiva unremarkable   ENT:  TM's intact bilaterally, no effusion   Nasal:  No mucosal edema     No nasal drainage   Oral:  mucosa moist and pink   Neck:  Supple   No palpable cervical lymphoadenopathy   Thyroid without mass or enlargement  Resp: Lungs CTAB   Equal and

## 2018-12-22 NOTE — CARE COORDINATION
12/22/18 1411   IMM Letter   IMM Letter given to Patient/Family/Significant other/Guardian/POA/by: Marilee Rizzo RN   IMM Letter date given: 12/22/18   IMM Letter time given: 1400   Important message from Medicare delivered to patient. A signed copy is placed in patient's lite chart. Another copy left with patient. Marilee Rizzo RNImportant message from Medicare delivered to patient. A signed copy is placed in patient's lite chart. Another copy left with patient.  Marilee Rizzo RN

## 2018-12-22 NOTE — H&P
[facility]- patient refusing and would like to stay here. Code Status: FULL   DVT prophylaxis: lovenox      NOTE: This report was transcribed using voice recognition software. Every effort was made to ensure accuracy; however, inadvertent computerized transcription errors may be present.   Electronically signed by BARBIE Salazar on 12/22/2018 at 9:51 AM

## 2018-12-23 LAB
ALBUMIN SERPL-MCNC: 3.2 G/DL (ref 3.5–5.2)
ALP BLD-CCNC: 40 U/L (ref 40–129)
ALT SERPL-CCNC: 36 U/L (ref 0–40)
ANION GAP SERPL CALCULATED.3IONS-SCNC: 8 MMOL/L (ref 7–16)
AST SERPL-CCNC: 32 U/L (ref 0–39)
BASOPHILS ABSOLUTE: 0.01 E9/L (ref 0–0.2)
BASOPHILS RELATIVE PERCENT: 0.3 % (ref 0–2)
BILIRUB SERPL-MCNC: 0.6 MG/DL (ref 0–1.2)
BUN BLDV-MCNC: 22 MG/DL (ref 8–23)
CALCIUM SERPL-MCNC: 7.8 MG/DL (ref 8.6–10.2)
CHLORIDE BLD-SCNC: 108 MMOL/L (ref 98–107)
CO2: 23 MMOL/L (ref 22–29)
CREAT SERPL-MCNC: 1.2 MG/DL (ref 0.7–1.2)
EOSINOPHILS ABSOLUTE: 0.05 E9/L (ref 0.05–0.5)
EOSINOPHILS RELATIVE PERCENT: 1.4 % (ref 0–6)
GFR AFRICAN AMERICAN: >60
GFR NON-AFRICAN AMERICAN: 59 ML/MIN/1.73
GLUCOSE BLD-MCNC: 115 MG/DL (ref 74–99)
HCT VFR BLD CALC: 38.4 % (ref 37–54)
HEMOGLOBIN: 12.3 G/DL (ref 12.5–16.5)
IMMATURE GRANULOCYTES #: 0.01 E9/L
IMMATURE GRANULOCYTES %: 0.3 % (ref 0–5)
LIPASE: 150 U/L (ref 13–60)
LYMPHOCYTES ABSOLUTE: 0.38 E9/L (ref 1.5–4)
LYMPHOCYTES RELATIVE PERCENT: 11 % (ref 20–42)
MCH RBC QN AUTO: 31 PG (ref 26–35)
MCHC RBC AUTO-ENTMCNC: 32 % (ref 32–34.5)
MCV RBC AUTO: 96.7 FL (ref 80–99.9)
METER GLUCOSE: 111 MG/DL (ref 74–99)
METER GLUCOSE: 112 MG/DL (ref 74–99)
METER GLUCOSE: 122 MG/DL (ref 74–99)
METER GLUCOSE: 123 MG/DL (ref 74–99)
MONOCYTES ABSOLUTE: 0.33 E9/L (ref 0.1–0.95)
MONOCYTES RELATIVE PERCENT: 9.6 % (ref 2–12)
NEUTROPHILS ABSOLUTE: 2.67 E9/L (ref 1.8–7.3)
NEUTROPHILS RELATIVE PERCENT: 77.4 % (ref 43–80)
PDW BLD-RTO: 14.5 FL (ref 11.5–15)
PLATELET # BLD: 136 E9/L (ref 130–450)
PMV BLD AUTO: 10 FL (ref 7–12)
POTASSIUM REFLEX MAGNESIUM: 3.8 MMOL/L (ref 3.5–5)
RBC # BLD: 3.97 E12/L (ref 3.8–5.8)
RBC # BLD: NORMAL 10*6/UL
SODIUM BLD-SCNC: 139 MMOL/L (ref 132–146)
TOTAL PROTEIN: 5.2 G/DL (ref 6.4–8.3)
WBC # BLD: 3.5 E9/L (ref 4.5–11.5)

## 2018-12-23 PROCEDURE — 2580000003 HC RX 258: Performed by: INTERNAL MEDICINE

## 2018-12-23 PROCEDURE — 85025 COMPLETE CBC W/AUTO DIFF WBC: CPT

## 2018-12-23 PROCEDURE — 6360000002 HC RX W HCPCS: Performed by: INTERNAL MEDICINE

## 2018-12-23 PROCEDURE — 2500000003 HC RX 250 WO HCPCS: Performed by: INTERNAL MEDICINE

## 2018-12-23 PROCEDURE — 36415 COLL VENOUS BLD VENIPUNCTURE: CPT

## 2018-12-23 PROCEDURE — 6360000002 HC RX W HCPCS: Performed by: EMERGENCY MEDICINE

## 2018-12-23 PROCEDURE — 80053 COMPREHEN METABOLIC PANEL: CPT

## 2018-12-23 PROCEDURE — 82962 GLUCOSE BLOOD TEST: CPT

## 2018-12-23 PROCEDURE — 99233 SBSQ HOSP IP/OBS HIGH 50: CPT | Performed by: INTERNAL MEDICINE

## 2018-12-23 PROCEDURE — 99233 SBSQ HOSP IP/OBS HIGH 50: CPT | Performed by: SURGERY

## 2018-12-23 PROCEDURE — 1200000000 HC SEMI PRIVATE

## 2018-12-23 PROCEDURE — 6370000000 HC RX 637 (ALT 250 FOR IP): Performed by: INTERNAL MEDICINE

## 2018-12-23 PROCEDURE — 87040 BLOOD CULTURE FOR BACTERIA: CPT

## 2018-12-23 PROCEDURE — 83690 ASSAY OF LIPASE: CPT

## 2018-12-23 RX ORDER — DEXTROSE MONOHYDRATE 50 MG/ML
100 INJECTION, SOLUTION INTRAVENOUS PRN
Status: DISCONTINUED | OUTPATIENT
Start: 2018-12-23 | End: 2018-12-24 | Stop reason: HOSPADM

## 2018-12-23 RX ORDER — NICOTINE POLACRILEX 4 MG
15 LOZENGE BUCCAL PRN
Status: DISCONTINUED | OUTPATIENT
Start: 2018-12-23 | End: 2018-12-24 | Stop reason: HOSPADM

## 2018-12-23 RX ORDER — DEXTROSE MONOHYDRATE 25 G/50ML
12.5 INJECTION, SOLUTION INTRAVENOUS PRN
Status: DISCONTINUED | OUTPATIENT
Start: 2018-12-23 | End: 2018-12-24 | Stop reason: HOSPADM

## 2018-12-23 RX ADMIN — SIMVASTATIN 40 MG: 40 TABLET, FILM COATED ORAL at 21:33

## 2018-12-23 RX ADMIN — AZTREONAM 2 G: 2 INJECTION, POWDER, LYOPHILIZED, FOR SOLUTION INTRAMUSCULAR; INTRAVENOUS at 22:44

## 2018-12-23 RX ADMIN — PANCRELIPASE 2 CAPSULE: 60000; 12000; 38000 CAPSULE, DELAYED RELEASE PELLETS ORAL at 08:19

## 2018-12-23 RX ADMIN — AZTREONAM 2 G: 2 INJECTION, POWDER, LYOPHILIZED, FOR SOLUTION INTRAMUSCULAR; INTRAVENOUS at 05:43

## 2018-12-23 RX ADMIN — ENOXAPARIN SODIUM 40 MG: 40 INJECTION SUBCUTANEOUS at 08:19

## 2018-12-23 RX ADMIN — ISOSORBIDE MONONITRATE 30 MG: 30 TABLET, EXTENDED RELEASE ORAL at 08:19

## 2018-12-23 RX ADMIN — Medication 10 ML: at 21:35

## 2018-12-23 RX ADMIN — METRONIDAZOLE 500 MG: 500 INJECTION, SOLUTION INTRAVENOUS at 21:33

## 2018-12-23 RX ADMIN — AZTREONAM 2 G: 2 INJECTION, POWDER, LYOPHILIZED, FOR SOLUTION INTRAMUSCULAR; INTRAVENOUS at 13:12

## 2018-12-23 RX ADMIN — METRONIDAZOLE 500 MG: 500 INJECTION, SOLUTION INTRAVENOUS at 12:03

## 2018-12-23 RX ADMIN — ONDANSETRON 4 MG: 2 INJECTION INTRAMUSCULAR; INTRAVENOUS at 04:33

## 2018-12-23 RX ADMIN — SODIUM CHLORIDE: 9 INJECTION, SOLUTION INTRAVENOUS at 04:29

## 2018-12-23 RX ADMIN — PANCRELIPASE 2 CAPSULE: 60000; 12000; 38000 CAPSULE, DELAYED RELEASE PELLETS ORAL at 12:03

## 2018-12-23 RX ADMIN — METRONIDAZOLE 500 MG: 500 INJECTION, SOLUTION INTRAVENOUS at 04:32

## 2018-12-23 RX ADMIN — ONDANSETRON 4 MG: 2 INJECTION INTRAMUSCULAR; INTRAVENOUS at 16:48

## 2018-12-23 ASSESSMENT — PAIN SCALES - GENERAL
PAINLEVEL_OUTOF10: 0

## 2018-12-24 VITALS
HEIGHT: 67 IN | HEART RATE: 70 BPM | TEMPERATURE: 99.2 F | DIASTOLIC BLOOD PRESSURE: 90 MMHG | SYSTOLIC BLOOD PRESSURE: 140 MMHG | WEIGHT: 162 LBS | RESPIRATION RATE: 16 BRPM | OXYGEN SATURATION: 99 % | BODY MASS INDEX: 25.43 KG/M2

## 2018-12-24 LAB
BLOOD CULTURE, ROUTINE: ABNORMAL
BLOOD CULTURE, ROUTINE: ABNORMAL
CULTURE, BLOOD 2: ABNORMAL
METER GLUCOSE: 138 MG/DL (ref 74–99)
METER GLUCOSE: 96 MG/DL (ref 74–99)
ORGANISM: ABNORMAL
URINE CULTURE, ROUTINE: NORMAL

## 2018-12-24 PROCEDURE — 2580000003 HC RX 258: Performed by: INTERNAL MEDICINE

## 2018-12-24 PROCEDURE — 99233 SBSQ HOSP IP/OBS HIGH 50: CPT | Performed by: SURGERY

## 2018-12-24 PROCEDURE — 6370000000 HC RX 637 (ALT 250 FOR IP): Performed by: INTERNAL MEDICINE

## 2018-12-24 PROCEDURE — 6360000002 HC RX W HCPCS: Performed by: EMERGENCY MEDICINE

## 2018-12-24 PROCEDURE — 2500000003 HC RX 250 WO HCPCS: Performed by: INTERNAL MEDICINE

## 2018-12-24 PROCEDURE — 99239 HOSP IP/OBS DSCHRG MGMT >30: CPT | Performed by: INTERNAL MEDICINE

## 2018-12-24 PROCEDURE — 82962 GLUCOSE BLOOD TEST: CPT

## 2018-12-24 RX ORDER — CEFDINIR 300 MG/1
300 CAPSULE ORAL EVERY 12 HOURS SCHEDULED
Status: DISCONTINUED | OUTPATIENT
Start: 2018-12-24 | End: 2018-12-24 | Stop reason: HOSPADM

## 2018-12-24 RX ORDER — CEFDINIR 300 MG/1
300 CAPSULE ORAL 2 TIMES DAILY
Qty: 28 CAPSULE | Refills: 0 | Status: SHIPPED | OUTPATIENT
Start: 2018-12-24 | End: 2019-01-07

## 2018-12-24 RX ADMIN — METRONIDAZOLE 500 MG: 500 INJECTION, SOLUTION INTRAVENOUS at 12:39

## 2018-12-24 RX ADMIN — ISOSORBIDE MONONITRATE 30 MG: 30 TABLET, EXTENDED RELEASE ORAL at 08:12

## 2018-12-24 RX ADMIN — ENOXAPARIN SODIUM 40 MG: 40 INJECTION SUBCUTANEOUS at 08:12

## 2018-12-24 RX ADMIN — AZTREONAM 2 G: 2 INJECTION, POWDER, LYOPHILIZED, FOR SOLUTION INTRAMUSCULAR; INTRAVENOUS at 06:46

## 2018-12-24 RX ADMIN — Medication 10 ML: at 12:39

## 2018-12-24 RX ADMIN — PANCRELIPASE 2 CAPSULE: 60000; 12000; 38000 CAPSULE, DELAYED RELEASE PELLETS ORAL at 11:52

## 2018-12-24 RX ADMIN — CEFDINIR 300 MG: 300 CAPSULE ORAL at 14:51

## 2018-12-24 RX ADMIN — Medication 10 ML: at 08:12

## 2018-12-24 RX ADMIN — PANCRELIPASE 2 CAPSULE: 60000; 12000; 38000 CAPSULE, DELAYED RELEASE PELLETS ORAL at 08:12

## 2018-12-24 RX ADMIN — METRONIDAZOLE 500 MG: 500 INJECTION, SOLUTION INTRAVENOUS at 05:42

## 2018-12-24 ASSESSMENT — PAIN SCALES - GENERAL: PAINLEVEL_OUTOF10: 0

## 2018-12-24 NOTE — DISCHARGE SUMMARY
combination of pneumonia and atelectasis. Other etiologies are not excluded. Continued follow-up to complete   resolution is recommended. XR CHEST PORTABLE   Final Result   No acute cardiopulmonary process. CT ABDOMEN PELVIS W IV CONTRAST Additional Contrast? None   Final Result   1. Significant although partial regression of the secondary findings   related with pancreatitis in the mesenteric root commented. 2. Presence of pancreatic stent. Decompressed pancreatic ductal   system. 3. No dilatation of the biliary tract. Status post cholecystectomy. 4. No acute inflammatory changes omental mesenteric fat planes free   intraperitoneal air ascites or bowel obstruction. 5. Uncomplicated diverticulosis of the left-sided colon. 6. Prominent size for the prostate gland. 7. Mild to moderate size hiatal hernia. Patient Instructions:      Medication List      START taking these medications    cefdinir 300 MG capsule  Commonly known as:  OMNICEF  Take 1 capsule by mouth 2 times daily for 14 days        CHANGE how you take these medications    ibuprofen 800 MG tablet  Commonly known as:  ADVIL;MOTRIN  Take 1 tablet by mouth every 8 hours  What changed:  · when to take this  · reasons to take this     losartan 100 MG tablet  Commonly known as:  COZAAR  Take 1 tablet by mouth daily  What changed:  when to take this     metFORMIN 1000 MG tablet  Commonly known as:  GLUCOPHAGE  TAKE 1 TABLET TWICE DAILY WITH MEALS  What changed:  See the new instructions. simvastatin 40 MG tablet  Commonly known as:  ZOCOR  TAKE 1 TABLET EVERY DAY  What changed:  See the new instructions. CONTINUE taking these medications    aspirin 81 MG EC tablet     blood glucose test strips strip  Commonly known as:  FREESTYLE LITE  1 each by Other route daily As needed.      fenofibrate micronized 134 MG capsule  Commonly known as:  LOFIBRA  TAKE 1 CAPSULE EVERY DAY     isosorbide mononitrate

## 2018-12-24 NOTE — PROGRESS NOTES
7p-11p chart check  Severo Furl
Aida from [facility] transfer center called to obtain an update of patient condition.  still does not have a bed available for the patient and they will call in the AM to give update of bed status. The direct number for the [facility] transfer center is [phone] if they need to be contacted. Dr. Trinity Zimmerman updated of this conversation.
General Surgery Progress Note    Subjective:   Chief complaint:  Abdominal pain  The patient is feeling somewhat better today. He is tolerating full liquids. He denies any abdominal pain. Scheduled Meds:   insulin lispro  0-6 Units Subcutaneous TID WC    insulin lispro  0-3 Units Subcutaneous Nightly    enoxaparin  40 mg Subcutaneous Daily    isosorbide mononitrate  30 mg Oral Daily    lipase-protease-amylase  2 capsule Oral TID WC    simvastatin  40 mg Oral Nightly    sodium chloride flush  10 mL Intravenous 2 times per day    aztreonam  2 g Intravenous Q8H    metroNIDAZOLE  500 mg Intravenous Q8H     Continuous Infusions:   dextrose       PRN Meds:glucose, dextrose, glucagon (rDNA), dextrose, acetaminophen, morphine, sodium chloride flush, magnesium hydroxide, ondansetron    Allergies   Allergen Reactions    Pcn [Penicillins] Swelling    Percocet [Oxycodone-Acetaminophen] Nausea And Vomiting       Objective:     BP (!) 140/90   Pulse 70   Temp 99.2 °F (37.3 °C) (Oral)   Resp 16   Ht 5' 7\" (1.702 m)   Wt 162 lb (73.5 kg)   SpO2 99%   BMI 25.37 kg/m²     Average, Min, and Max for last 24 hours Vitals:  TEMPERATURE:  Temp  Av.5 °F (36.9 °C)  Min: 97.9 °F (36.6 °C)  Max: 99.2 °F (37.3 °C)    RESPIRATIONS RANGE: Resp  Av  Min: 16  Max: 16    PULSE RANGE: Pulse  Av.3  Min: 49  Max: 70    BLOOD PRESSURE RANGE:  Systolic (58EJT), YWI:460 , Min:140 , DRR:243   ; Diastolic (23BFB), PWN:95, Min:68, Max:90      PULSE OXIMETRY RANGE: SpO2  Av.3 %  Min: 96 %  Max: 99 %    I/O last 3 completed shifts:   In: 900 [P.O.:900]  Out: 150 [Urine:150]    Lab Results   Component Value Date    WBC 3.5 (L) 2018    HGB 12.3 (L) 2018    HCT 38.4 2018    MCV 96.7 2018     2018     Lab Results   Component Value Date     2018    K 3.8 2018     2018    CO2 23 2018    BUN 22 2018    CREATININE 1.2 2018    GLUCOSE 115
Results   Component Value Date    WBC 3.5 (L) 12/23/2018    WBC 4.6 12/22/2018    WBC 5.1 12/21/2018    HGB 12.3 (L) 12/23/2018    HCT 38.4 12/23/2018    MCV 96.7 12/23/2018     12/23/2018     Lab Results   Component Value Date    NEUTROABS 2.67 12/23/2018    NEUTROABS 4.08 12/22/2018    NEUTROABS 4.66 12/21/2018     No results found for: CRP  No results found for: CRPHS  No results found for: SEDRATE  Lab Results   Component Value Date    ALT 36 12/23/2018    AST 32 12/23/2018    ALKPHOS 40 12/23/2018    BILITOT 0.6 12/23/2018     Lab Results   Component Value Date     12/23/2018    K 3.8 12/23/2018     12/23/2018    CO2 23 12/23/2018    BUN 22 12/23/2018    CREATININE 1.2 12/23/2018    CREATININE 1.1 12/22/2018    CREATININE 1.3 12/21/2018    GFRAA >60 12/23/2018    LABGLOM 59 12/23/2018    GLUCOSE 115 12/23/2018    PROT 5.2 12/23/2018    LABALBU 3.2 12/23/2018    CALCIUM 7.8 12/23/2018    BILITOT 0.6 12/23/2018    ALKPHOS 40 12/23/2018    AST 32 12/23/2018    ALT 36 12/23/2018     Radiology:  Ct abd  Impression:        1. Significant although partial regression of the secondary findings  related with pancreatitis in the mesenteric root commented. 2. Presence of pancreatic stent. Decompressed pancreatic ductal  system. 3. No dilatation of the biliary tract. Status post cholecystectomy. 4. No acute inflammatory changes omental mesenteric fat planes free  intraperitoneal air ascites or bowel obstruction. 5. Uncomplicated diverticulosis of the left-sided colon. 6. Prominent size for the prostate gland. 7. Mild to moderate size hiatal hernia.          Microbiology:   [facility] + 12/21---12/22  Recent Labs      12/22/18   1240   BC  Refer to previous culture CXBL 12/21/2018 1810 for susceptibility  results       Recent Labs      12/21/18   1811  12/22/18   1240  12/22/18   1255   ORG  Escherichia coli*  Escherichia coli*  Escherichia coli*     Recent Labs      12/21/18   1811  12/22/18
nerve deficit and speech normal        Recent Labs      12/21/18   1810  12/22/18   0610  12/23/18   0346   NA  136  135  139   K  4.7  3.6  3.8   CL  100  100  108*   CO2  21*  22  23   BUN  28*  21  22   CREATININE  1.3*  1.1  1.2   GLUCOSE  203*  151*  115*   CALCIUM  8.7  8.3*  7.8*       Recent Labs      12/21/18   1810  12/22/18   0610  12/23/18   0346   WBC  5.1  4.6  3.5*   RBC  4.28  4.09  3.97   HGB  13.3  12.8  12.3*   HCT  40.4  38.2  38.4   MCV  94.4  93.4  96.7   MCH  31.1  31.3  31.0   MCHC  32.9  33.5  32.0   RDW  14.4  14.4  14.5   PLT  156  140  136   MPV  9.9  9.5  10.0         Assessment:    Principal Problem:    Pancreatitis, unspecified pancreatitis type  Active Problems:    Hypertension    Hyperlipidemia    CAD (coronary artery disease)    GERD (gastroesophageal reflux disease)    Type 2 diabetes mellitus without complication, without long-term current use of insulin ([facility])    Pancreatic duct stones    Bacteremia    Sepsis ([facility] [address]  Resolved Problems:    * No resolved hospital problems. *      Plan:  1. Acute pancreatitis most likely following ERCP, presenting lipase in the 400s, refused to be transferred to [facility], treating with IV fluids for supportive care, pain management, advance diet. 2.  Bacteremia, blood cultures 4 out of 4 bottles came back positive with gram-negative rods, even the repeated cultures from yesterday are positive GNR, appreciate ID input, abx switched to aztreonam and flagyl. Most likely the source is pancreatic duct. 3.  Aspiration pneumonitis, after vomiting, he is already on IV abx, which should cover for aspiration pneumonia. .  4.  acute hypoxic respiratory distress, 88-89% on room air yesterday, today back to normal, due to aspiration pneumonitis. chest x-ray to showed infiltrate, most likely aspiration pneumonitis  5. Sepsis with septicemia continue IV antibiotics  6. HTN, BP is acceptable today, continue home meds. 7.  HLD, on statin  8.   DM II, last A1c was 6.1,
pancreatitis. Cont IV abx, begin to advance diet.       Electronically signed by Collin Bobo DO on 12/23/18 at 2:46 PM

## 2018-12-26 ENCOUNTER — CARE COORDINATION (OUTPATIENT)
Dept: CASE MANAGEMENT | Age: 78
End: 2018-12-26

## 2018-12-26 DIAGNOSIS — K76.0 HEPATIC STEATOSIS: Primary | Chronic | ICD-10-CM

## 2018-12-26 PROCEDURE — 1111F DSCHRG MED/CURRENT MED MERGE: CPT | Performed by: PHYSICIAN ASSISTANT

## 2018-12-28 LAB
BLOOD CULTURE, ROUTINE: NORMAL
CULTURE, BLOOD 2: NORMAL

## 2019-01-02 ENCOUNTER — HOSPITAL ENCOUNTER (OUTPATIENT)
Age: 79
Discharge: HOME | End: 2019-01-02

## 2019-01-02 PROCEDURE — 9900000038 HC CARDIAC REHAB PHASE 3 - MONTHLY

## 2019-01-03 ENCOUNTER — CARE COORDINATION (OUTPATIENT)
Dept: CASE MANAGEMENT | Age: 79
End: 2019-01-03

## 2019-01-07 ENCOUNTER — OFFICE VISIT (OUTPATIENT)
Dept: PRIMARY CARE CLINIC | Age: 79
End: 2019-01-07
Payer: MEDICARE

## 2019-01-07 ENCOUNTER — HOSPITAL ENCOUNTER (OUTPATIENT)
Age: 79
Discharge: HOME | End: 2019-01-09
Payer: MEDICARE

## 2019-01-07 VITALS
BODY MASS INDEX: 24.28 KG/M2 | SYSTOLIC BLOOD PRESSURE: 138 MMHG | HEART RATE: 65 BPM | DIASTOLIC BLOOD PRESSURE: 80 MMHG | WEIGHT: 155 LBS | OXYGEN SATURATION: 95 % | TEMPERATURE: 97.5 F

## 2019-01-07 DIAGNOSIS — A41.51 SEPSIS DUE TO ESCHERICHIA COLI (HCC): ICD-10-CM

## 2019-01-07 DIAGNOSIS — E11.9 TYPE 2 DIABETES MELLITUS WITHOUT COMPLICATION, WITHOUT LONG-TERM CURRENT USE OF INSULIN (HCC): Primary | Chronic | ICD-10-CM

## 2019-01-07 DIAGNOSIS — K85.90 PANCREATITIS, UNSPECIFIED PANCREATITIS TYPE: ICD-10-CM

## 2019-01-07 DIAGNOSIS — K86.89 PANCREATIC DUCT STONES: Chronic | ICD-10-CM

## 2019-01-07 DIAGNOSIS — E44.0 MODERATE PROTEIN-CALORIE MALNUTRITION (HCC): Chronic | ICD-10-CM

## 2019-01-07 LAB
ALBUMIN SERPL-MCNC: 4.2 G/DL (ref 3.5–5.2)
ALP BLD-CCNC: 36 U/L (ref 40–129)
ALT SERPL-CCNC: 18 U/L (ref 0–40)
ANION GAP SERPL CALCULATED.3IONS-SCNC: 16 MMOL/L (ref 7–16)
AST SERPL-CCNC: 22 U/L (ref 0–39)
BILIRUB SERPL-MCNC: 0.5 MG/DL (ref 0–1.2)
BUN BLDV-MCNC: 36 MG/DL (ref 8–23)
CALCIUM SERPL-MCNC: 9.3 MG/DL (ref 8.6–10.2)
CHLORIDE BLD-SCNC: 103 MMOL/L (ref 98–107)
CO2: 21 MMOL/L (ref 22–29)
CREAT SERPL-MCNC: 1.2 MG/DL (ref 0.7–1.2)
GFR AFRICAN AMERICAN: >60
GFR NON-AFRICAN AMERICAN: 59 ML/MIN/1.73
GLUCOSE BLD-MCNC: 100 MG/DL (ref 74–99)
LIPASE: 500 U/L (ref 13–60)
POTASSIUM SERPL-SCNC: 4.5 MMOL/L (ref 3.5–5)
SODIUM BLD-SCNC: 140 MMOL/L (ref 132–146)
TOTAL PROTEIN: 6.8 G/DL (ref 6.4–8.3)

## 2019-01-07 PROCEDURE — G8427 DOCREV CUR MEDS BY ELIG CLIN: HCPCS | Performed by: PHYSICIAN ASSISTANT

## 2019-01-07 PROCEDURE — G8420 CALC BMI NORM PARAMETERS: HCPCS | Performed by: PHYSICIAN ASSISTANT

## 2019-01-07 PROCEDURE — 4040F PNEUMOC VAC/ADMIN/RCVD: CPT | Performed by: PHYSICIAN ASSISTANT

## 2019-01-07 PROCEDURE — 1101F PT FALLS ASSESS-DOCD LE1/YR: CPT | Performed by: PHYSICIAN ASSISTANT

## 2019-01-07 PROCEDURE — 1111F DSCHRG MED/CURRENT MED MERGE: CPT | Performed by: PHYSICIAN ASSISTANT

## 2019-01-07 PROCEDURE — 1123F ACP DISCUSS/DSCN MKR DOCD: CPT | Performed by: PHYSICIAN ASSISTANT

## 2019-01-07 PROCEDURE — G8598 ASA/ANTIPLAT THER USED: HCPCS | Performed by: PHYSICIAN ASSISTANT

## 2019-01-07 PROCEDURE — 80053 COMPREHEN METABOLIC PANEL: CPT

## 2019-01-07 PROCEDURE — G8484 FLU IMMUNIZE NO ADMIN: HCPCS | Performed by: PHYSICIAN ASSISTANT

## 2019-01-07 PROCEDURE — 1036F TOBACCO NON-USER: CPT | Performed by: PHYSICIAN ASSISTANT

## 2019-01-07 PROCEDURE — 99213 OFFICE O/P EST LOW 20 MIN: CPT | Performed by: PHYSICIAN ASSISTANT

## 2019-01-07 PROCEDURE — 83690 ASSAY OF LIPASE: CPT

## 2019-01-10 ENCOUNTER — OFFICE VISIT (OUTPATIENT)
Dept: HEMATOLOGY | Age: 79
End: 2019-01-10
Payer: MEDICARE

## 2019-01-10 VITALS
HEART RATE: 65 BPM | DIASTOLIC BLOOD PRESSURE: 73 MMHG | RESPIRATION RATE: 18 BRPM | OXYGEN SATURATION: 98 % | BODY MASS INDEX: 24.17 KG/M2 | HEIGHT: 67 IN | WEIGHT: 154 LBS | SYSTOLIC BLOOD PRESSURE: 164 MMHG

## 2019-01-10 DIAGNOSIS — K86.89 PANCREATIC DUCT CALCULUS: Primary | ICD-10-CM

## 2019-01-10 PROCEDURE — G8598 ASA/ANTIPLAT THER USED: HCPCS | Performed by: TRANSPLANT SURGERY

## 2019-01-10 PROCEDURE — G8420 CALC BMI NORM PARAMETERS: HCPCS | Performed by: TRANSPLANT SURGERY

## 2019-01-10 PROCEDURE — 1036F TOBACCO NON-USER: CPT | Performed by: TRANSPLANT SURGERY

## 2019-01-10 PROCEDURE — 4040F PNEUMOC VAC/ADMIN/RCVD: CPT | Performed by: TRANSPLANT SURGERY

## 2019-01-10 PROCEDURE — G8484 FLU IMMUNIZE NO ADMIN: HCPCS | Performed by: TRANSPLANT SURGERY

## 2019-01-10 PROCEDURE — G8427 DOCREV CUR MEDS BY ELIG CLIN: HCPCS | Performed by: TRANSPLANT SURGERY

## 2019-01-10 PROCEDURE — 1111F DSCHRG MED/CURRENT MED MERGE: CPT | Performed by: TRANSPLANT SURGERY

## 2019-01-10 PROCEDURE — 99212 OFFICE O/P EST SF 10 MIN: CPT | Performed by: TRANSPLANT SURGERY

## 2019-01-10 PROCEDURE — 1123F ACP DISCUSS/DSCN MKR DOCD: CPT | Performed by: TRANSPLANT SURGERY

## 2019-01-10 PROCEDURE — 1101F PT FALLS ASSESS-DOCD LE1/YR: CPT | Performed by: TRANSPLANT SURGERY

## 2019-01-11 ENCOUNTER — CARE COORDINATION (OUTPATIENT)
Dept: CASE MANAGEMENT | Age: 79
End: 2019-01-11

## 2019-01-31 RX ORDER — SIMVASTATIN 40 MG
TABLET ORAL
Qty: 90 TABLET | Refills: 1 | Status: SHIPPED | OUTPATIENT
Start: 2019-01-31 | End: 2019-06-17 | Stop reason: SDUPTHER

## 2019-02-01 ENCOUNTER — HOSPITAL ENCOUNTER (OUTPATIENT)
Age: 79
Discharge: HOME | End: 2019-02-01

## 2019-02-01 PROCEDURE — 9900000038 HC CARDIAC REHAB PHASE 3 - MONTHLY

## 2019-02-14 ENCOUNTER — OFFICE VISIT (OUTPATIENT)
Dept: HEMATOLOGY | Age: 79
End: 2019-02-14
Payer: MEDICARE

## 2019-02-14 VITALS
SYSTOLIC BLOOD PRESSURE: 148 MMHG | DIASTOLIC BLOOD PRESSURE: 76 MMHG | BODY MASS INDEX: 24.37 KG/M2 | OXYGEN SATURATION: 96 % | TEMPERATURE: 98 F | HEIGHT: 67 IN | HEART RATE: 73 BPM | WEIGHT: 155.3 LBS

## 2019-02-14 DIAGNOSIS — K86.1 OTHER CHRONIC PANCREATITIS (HCC): Primary | ICD-10-CM

## 2019-02-14 PROCEDURE — G8427 DOCREV CUR MEDS BY ELIG CLIN: HCPCS | Performed by: TRANSPLANT SURGERY

## 2019-02-14 PROCEDURE — 1036F TOBACCO NON-USER: CPT | Performed by: TRANSPLANT SURGERY

## 2019-02-14 PROCEDURE — 1101F PT FALLS ASSESS-DOCD LE1/YR: CPT | Performed by: TRANSPLANT SURGERY

## 2019-02-14 PROCEDURE — G8420 CALC BMI NORM PARAMETERS: HCPCS | Performed by: TRANSPLANT SURGERY

## 2019-02-14 PROCEDURE — G8484 FLU IMMUNIZE NO ADMIN: HCPCS | Performed by: TRANSPLANT SURGERY

## 2019-02-14 PROCEDURE — 99212 OFFICE O/P EST SF 10 MIN: CPT | Performed by: TRANSPLANT SURGERY

## 2019-02-14 PROCEDURE — 4040F PNEUMOC VAC/ADMIN/RCVD: CPT | Performed by: TRANSPLANT SURGERY

## 2019-02-14 PROCEDURE — G8598 ASA/ANTIPLAT THER USED: HCPCS | Performed by: TRANSPLANT SURGERY

## 2019-02-14 PROCEDURE — 1123F ACP DISCUSS/DSCN MKR DOCD: CPT | Performed by: TRANSPLANT SURGERY

## 2019-03-02 ENCOUNTER — HOSPITAL ENCOUNTER (OUTPATIENT)
Age: 79
Discharge: HOME | End: 2019-03-02

## 2019-03-02 PROCEDURE — 9900000038 HC CARDIAC REHAB PHASE 3 - MONTHLY

## 2019-03-06 DIAGNOSIS — I10 ESSENTIAL HYPERTENSION: ICD-10-CM

## 2019-03-07 RX ORDER — LOSARTAN POTASSIUM 100 MG/1
100 TABLET ORAL EVERY MORNING
Qty: 90 TABLET | Refills: 3 | Status: SHIPPED | OUTPATIENT
Start: 2019-03-07 | End: 2019-12-27

## 2019-03-25 RX ORDER — FENOFIBRATE 134 MG/1
CAPSULE ORAL
Qty: 90 CAPSULE | Refills: 1 | Status: SHIPPED | OUTPATIENT
Start: 2019-03-25 | End: 2019-08-12 | Stop reason: SDUPTHER

## 2019-04-01 ENCOUNTER — HOSPITAL ENCOUNTER (OUTPATIENT)
Age: 79
Discharge: HOME | End: 2019-04-01

## 2019-04-01 PROCEDURE — 9900000038 HC CARDIAC REHAB PHASE 3 - MONTHLY

## 2019-04-08 RX ORDER — ISOSORBIDE MONONITRATE 30 MG/1
TABLET, EXTENDED RELEASE ORAL
Qty: 90 TABLET | Refills: 1 | Status: SHIPPED | OUTPATIENT
Start: 2019-04-08 | End: 2019-08-22 | Stop reason: SDUPTHER

## 2019-04-09 ENCOUNTER — OFFICE VISIT (OUTPATIENT)
Dept: PRIMARY CARE CLINIC | Age: 79
End: 2019-04-09
Payer: MEDICARE

## 2019-04-09 ENCOUNTER — HOSPITAL ENCOUNTER (OUTPATIENT)
Age: 79
Discharge: HOME | End: 2019-04-11
Payer: MEDICARE

## 2019-04-09 VITALS
TEMPERATURE: 98 F | WEIGHT: 160 LBS | HEIGHT: 67 IN | OXYGEN SATURATION: 97 % | HEART RATE: 70 BPM | SYSTOLIC BLOOD PRESSURE: 132 MMHG | DIASTOLIC BLOOD PRESSURE: 74 MMHG | BODY MASS INDEX: 25.11 KG/M2

## 2019-04-09 DIAGNOSIS — Z00.00 ROUTINE GENERAL MEDICAL EXAMINATION AT A HEALTH CARE FACILITY: Primary | ICD-10-CM

## 2019-04-09 DIAGNOSIS — E78.2 MIXED HYPERLIPIDEMIA: Chronic | ICD-10-CM

## 2019-04-09 DIAGNOSIS — I10 ESSENTIAL HYPERTENSION: ICD-10-CM

## 2019-04-09 DIAGNOSIS — E11.9 TYPE 2 DIABETES MELLITUS WITHOUT COMPLICATION, WITHOUT LONG-TERM CURRENT USE OF INSULIN (HCC): Primary | Chronic | ICD-10-CM

## 2019-04-09 DIAGNOSIS — R97.20 PSA ELEVATION: ICD-10-CM

## 2019-04-09 DIAGNOSIS — Z12.5 SCREENING PSA (PROSTATE SPECIFIC ANTIGEN): ICD-10-CM

## 2019-04-09 DIAGNOSIS — E11.9 TYPE 2 DIABETES MELLITUS WITHOUT COMPLICATION, WITHOUT LONG-TERM CURRENT USE OF INSULIN (HCC): Chronic | ICD-10-CM

## 2019-04-09 DIAGNOSIS — I25.10 CORONARY ARTERY DISEASE INVOLVING NATIVE CORONARY ARTERY OF NATIVE HEART WITHOUT ANGINA PECTORIS: Chronic | ICD-10-CM

## 2019-04-09 LAB
ALBUMIN SERPL-MCNC: 4.1 G/DL (ref 3.5–5.2)
ALP BLD-CCNC: 33 U/L (ref 40–129)
ALT SERPL-CCNC: 22 U/L (ref 0–40)
ANION GAP SERPL CALCULATED.3IONS-SCNC: 12 MMOL/L (ref 7–16)
AST SERPL-CCNC: 24 U/L (ref 0–39)
BILIRUB SERPL-MCNC: 0.4 MG/DL (ref 0–1.2)
BUN BLDV-MCNC: 29 MG/DL (ref 8–23)
CALCIUM SERPL-MCNC: 9.2 MG/DL (ref 8.6–10.2)
CHLORIDE BLD-SCNC: 111 MMOL/L (ref 98–107)
CHOLESTEROL, TOTAL: 118 MG/DL (ref 0–199)
CO2: 22 MMOL/L (ref 22–29)
CREAT SERPL-MCNC: 1.3 MG/DL (ref 0.7–1.2)
GFR AFRICAN AMERICAN: >60
GFR NON-AFRICAN AMERICAN: 53 ML/MIN/1.73
GLUCOSE BLD-MCNC: 159 MG/DL (ref 74–99)
HBA1C MFR BLD: 7.3 % (ref 4–5.6)
HCT VFR BLD CALC: 42.5 % (ref 37–54)
HDLC SERPL-MCNC: 30 MG/DL
HEMOGLOBIN: 13.6 G/DL (ref 12.5–16.5)
LDL CHOLESTEROL CALCULATED: 52 MG/DL (ref 0–99)
MCH RBC QN AUTO: 31 PG (ref 26–35)
MCHC RBC AUTO-ENTMCNC: 32 % (ref 32–34.5)
MCV RBC AUTO: 96.8 FL (ref 80–99.9)
PDW BLD-RTO: 14.1 FL (ref 11.5–15)
PLATELET # BLD: 241 E9/L (ref 130–450)
PMV BLD AUTO: 10.1 FL (ref 7–12)
POTASSIUM SERPL-SCNC: 4.2 MMOL/L (ref 3.5–5)
PROSTATE SPECIFIC ANTIGEN: 3.48 NG/ML (ref 0–4)
RBC # BLD: 4.39 E12/L (ref 3.8–5.8)
SODIUM BLD-SCNC: 145 MMOL/L (ref 132–146)
TOTAL PROTEIN: 6.4 G/DL (ref 6.4–8.3)
TRIGL SERPL-MCNC: 181 MG/DL (ref 0–149)
VLDLC SERPL CALC-MCNC: 36 MG/DL
WBC # BLD: 5.2 E9/L (ref 4.5–11.5)

## 2019-04-09 PROCEDURE — 80061 LIPID PANEL: CPT

## 2019-04-09 PROCEDURE — 85027 COMPLETE CBC AUTOMATED: CPT

## 2019-04-09 PROCEDURE — G8598 ASA/ANTIPLAT THER USED: HCPCS | Performed by: PHYSICIAN ASSISTANT

## 2019-04-09 PROCEDURE — 4040F PNEUMOC VAC/ADMIN/RCVD: CPT | Performed by: PHYSICIAN ASSISTANT

## 2019-04-09 PROCEDURE — 80053 COMPREHEN METABOLIC PANEL: CPT

## 2019-04-09 PROCEDURE — 83036 HEMOGLOBIN GLYCOSYLATED A1C: CPT

## 2019-04-09 PROCEDURE — G0103 PSA SCREENING: HCPCS

## 2019-04-09 PROCEDURE — G0439 PPPS, SUBSEQ VISIT: HCPCS | Performed by: PHYSICIAN ASSISTANT

## 2019-04-09 ASSESSMENT — PATIENT HEALTH QUESTIONNAIRE - PHQ9
SUM OF ALL RESPONSES TO PHQ QUESTIONS 1-9: 0
SUM OF ALL RESPONSES TO PHQ QUESTIONS 1-9: 0

## 2019-04-09 NOTE — PROGRESS NOTES
Medicare Annual Wellness Visit  Name: Manohar Causey Date: 2019   MRN: [id number] Sex: Male   Age: 69 y.o. Ethnicity: Non-/Non    : 1940 Race: Franc Senior is here for Medicare AWV    Screenings for behavioral, psychosocial and functional/safety risks, and cognitive dysfunction are all negative except as indicated below. These results, as well as other patient data from the [address] form, are documented in Flowsheets linked to this Encounter. Allergies   Allergen Reactions    Pcn [Penicillins] Swelling    Percocet [Oxycodone-Acetaminophen] Nausea And Vomiting     Prior to Visit Medications    Medication Sig Taking? Authorizing Provider   isosorbide mononitrate (IMDUR) 30 MG extended release tablet TAKE 1 TABLET EVERY DAY Yes Randi Montes De Oca PA-C   fenofibrate micronized (LOFIBRA) 134 MG capsule TAKE 1 CAPSULE EVERY DAY Yes Randi Montes De Oca PA-C   losartan (COZAAR) 100 MG tablet Take 1 tablet by mouth every morning Yes Randi Montes De Oca PA-C   simvastatin (ZOCOR) 40 MG tablet TAKE 1 TABLET EVERY DAY Yes Randi Montes De Oca PA-C   blood glucose test strips (FREESTYLE LITE) strip 1 each by Other route daily As needed.  Yes Wilbur Velasco PA-C   metFORMIN (GLUCOPHAGE) 1000 MG tablet TAKE 1 TABLET TWICE DAILY WITH MEALS  Patient taking differently: TAKE 1 TABLET BY MOUTH TWICE DAILY WITH MEALS Yes Wilbur Velasco PA-C   ibuprofen (ADVIL;MOTRIN) 800 MG tablet Take 1 tablet by mouth every 8 hours  Patient taking differently: Take 800 mg by mouth every 8 hours as needed for Pain or Fever  Yes Paolo William MD   ranitidine (ZANTAC) 150 MG tablet Take 150 mg by mouth nightly Yes Historical Provider, MD   lipase-protease-amylase (CREON) 74194-00039 units delayed release capsule Take 1 capsule by mouth 3 times daily (with meals) Yes Sheila Boles III, MD   aspirin 81 MG EC tablet Take 81 mg by mouth daily  Yes Historical Provider, MD     Past normal  Neck: neck supple and non tender without mass, no thyromegaly or thyroid nodules, no cervical lymphadenopathy   Cardiovascular: normal rate, normal S1 and S2, no gallops, intact distal pulses and no carotid bruits. No extremity edema. Abdomen: soft, non-tender, non-distended, normal bowel sounds, no masses or organomegaly. Skin: no lesions, no rashes. Normal turgor. Patient's complete Health Risk Assessment and screening values have been reviewed and are found in Flowsheets. The following problems were reviewed today and where indicated follow up appointments were made and/or referrals ordered. Positive Risk Factor Screenings with Interventions:     Health Habits/Nutrition:  Health Habits/Nutrition  Do you exercise for at least 20 minutes 2-3 times per week?: Yes  Have you lost any weight without trying in the past 3 months?: (!) Yes  Do you eat fewer than 2 meals per day?: No  Have you seen a dentist within the past year?: (!) No  Body mass index is 25.06 kg/m². Health Habits/Nutrition Interventions:  · Nutritional issues:  educational materials for healthy, well-balanced diet provided. Pt has had chorn cpancreaitis bouts, and now is back to his normal and eating better, has gained some weight. · Dental exam overdue:  patient encouraged to make appointment with his/her dentist. He tells me he will do so.      Hearing/Vision:  Hearing/Vision  Do you or your family notice any trouble with your hearing?: (!) Yes  Do you have difficulty driving, watching TV, or doing any of your daily activities because of your eyesight?: No  Have you had an eye exam within the past year?: Yes  Hearing/Vision Interventions:  · Hearing concerns:  patient declines any further evaluation/treatment for hearing issues    Safety:  Safety  Do you have working smoke detectors?: Yes  Have all throw rugs been removed or fastened?: (!) No  Do you have non-slip mats in all bathtubs?: Yes  Do all of your stairways have a railing or banister?: (!) No  Are your doorways, halls and stairs free of clutter?: Yes  Do you always fasten your seatbelt when you are in a car?: Yes  Safety Interventions:  · Patient declines any further evaluation/treatment for this issue    Personalized Preventive Plan   Current Health Maintenance Status  Immunization History   Administered Date(s) Administered    Influenza Vaccine, unspecified formulation 10/16/2015, 10/01/2018    Influenza, High Dose (Fluzone 65 yrs and older) 10/16/2015, 10/14/2016, 10/24/2017    Pneumococcal 13-valent Conjugate (Alice Yenni) 11/12/2002, 09/14/2018        Health Maintenance   Topic Date Due    DTaP/Tdap/Td vaccine (1 - Tdap) 05/18/1960    Shingles Vaccine (1 of 2) 05/18/1991    Pneumococcal 65+ years Vaccine (2 of 2 - PPSV23) 09/14/2019    Potassium monitoring  01/07/2020    Creatinine monitoring  01/07/2020    Flu vaccine  Completed     Recommendations for Preventive Services Due: see orders and patient instructions/AVS.  . Recommended screening schedule for the next 5-10 years is provided to the patient in written form: see Patient Instructions/AVS.    We did review his blood sugar log, ranges , he is stable. Hx of chronic pancreatitis, due to stone, which was removed via ERCP, and had ductal stent removed most recent with  on 3/28/19, had FU here locally with Dr. Savanah Guardado and was released. He is on Creon (only when he eats out), has been very careful with his diet at home and what he cooks and has been doing well. Is on fenofibrate, and simvastatin. He is fastign today but we will check labs. A1c was 6.1%. Will check again today as well. Will have him FU here in 6 months. Sooner if needed. He is stable. He has FU with cardiology in 2 months.

## 2019-05-02 ENCOUNTER — HOSPITAL ENCOUNTER (OUTPATIENT)
Age: 79
Discharge: HOME | End: 2019-05-02

## 2019-05-02 PROCEDURE — 9900000038 HC CARDIAC REHAB PHASE 3 - MONTHLY

## 2019-05-30 ENCOUNTER — OFFICE VISIT (OUTPATIENT)
Dept: CARDIOLOGY CLINIC | Age: 79
End: 2019-05-30
Payer: MEDICARE

## 2019-05-30 VITALS
WEIGHT: 161.2 LBS | RESPIRATION RATE: 18 BRPM | HEART RATE: 52 BPM | HEIGHT: 67 IN | DIASTOLIC BLOOD PRESSURE: 88 MMHG | SYSTOLIC BLOOD PRESSURE: 136 MMHG | BODY MASS INDEX: 25.3 KG/M2

## 2019-05-30 DIAGNOSIS — Z95.1 HX OF CABG: ICD-10-CM

## 2019-05-30 DIAGNOSIS — I25.119 CORONARY ARTERY DISEASE INVOLVING NATIVE CORONARY ARTERY OF NATIVE HEART WITH ANGINA PECTORIS (HCC): Primary | Chronic | ICD-10-CM

## 2019-05-30 DIAGNOSIS — E78.5 HYPERLIPIDEMIA, UNSPECIFIED HYPERLIPIDEMIA TYPE: Chronic | ICD-10-CM

## 2019-05-30 DIAGNOSIS — R00.1 BRADYCARDIA: ICD-10-CM

## 2019-05-30 DIAGNOSIS — I10 ESSENTIAL HYPERTENSION: Chronic | ICD-10-CM

## 2019-05-30 PROCEDURE — G8417 CALC BMI ABV UP PARAM F/U: HCPCS | Performed by: PHYSICIAN ASSISTANT

## 2019-05-30 PROCEDURE — 4040F PNEUMOC VAC/ADMIN/RCVD: CPT | Performed by: PHYSICIAN ASSISTANT

## 2019-05-30 PROCEDURE — G8598 ASA/ANTIPLAT THER USED: HCPCS | Performed by: PHYSICIAN ASSISTANT

## 2019-05-30 PROCEDURE — 99213 OFFICE O/P EST LOW 20 MIN: CPT | Performed by: PHYSICIAN ASSISTANT

## 2019-05-30 PROCEDURE — 1123F ACP DISCUSS/DSCN MKR DOCD: CPT | Performed by: PHYSICIAN ASSISTANT

## 2019-05-30 PROCEDURE — G8427 DOCREV CUR MEDS BY ELIG CLIN: HCPCS | Performed by: PHYSICIAN ASSISTANT

## 2019-05-30 PROCEDURE — 1036F TOBACCO NON-USER: CPT | Performed by: PHYSICIAN ASSISTANT

## 2019-05-30 PROCEDURE — 93000 ELECTROCARDIOGRAM COMPLETE: CPT | Performed by: PHYSICIAN ASSISTANT

## 2019-05-30 NOTE — PATIENT INSTRUCTIONS
Patient Education        Learning About Coronary Artery Disease (CAD)  What is coronary artery disease? Coronary artery disease (CAD) occurs when plaque builds up in the arteries that bring oxygen-rich blood to your heart. Plaque is a fatty substance made of cholesterol, calcium, and other substances in the blood. This process is called hardening of the arteries, or atherosclerosis. What happens when you have coronary artery disease? · Plaque may narrow the coronary arteries. Narrowed arteries cause poor blood flow. This can lead to angina symptoms such as chest pain or discomfort. If blood flow is completely blocked, you could have a heart attack. · You can slow CAD and reduce the risk of future problems by making changes in your lifestyle. These include quitting smoking and eating heart-healthy foods. · Treatments for CAD, along with changes in your lifestyle, can help you live a longer and healthier life. How can you prevent coronary artery disease? · Do not smoke. It may be the best thing you can do to prevent heart disease. If you need help quitting, talk to your doctor about stop-smoking programs and medicines. These can increase your chances of quitting for good. · Be active. Get at least 30 minutes of exercise on most days of the week. Walking is a good choice. You also may want to do other activities, such as running, swimming, cycling, or playing tennis or team sports. · Eat heart-healthy foods. Eat more fruits and vegetables and less foods that contain saturated and trans fats. Limit alcohol, sodium, and sweets. · Stay at a healthy weight. Lose weight if you need to. · Manage other health problems such as diabetes, high blood pressure, and high cholesterol. · Manage stress. Stress can hurt your heart. To keep stress low, talk about your problems and feelings. Don't keep your feelings hidden. · If you have talked about it with your doctor, take a low-dose aspirin every day.  Aspirin can help certain people lower their risk of a heart attack or stroke. But taking aspirin isn't right for everyone, because it can cause serious bleeding. Do not start taking daily aspirin unless your doctor knows about it. How is coronary artery disease treated? · Your doctor will suggest that you make lifestyle changes. For example, your doctor may ask you to eat healthy foods, quit smoking, lose extra weight, and be more active. · You will have to take medicines. · Your doctor may suggest a procedure to open narrowed or blocked arteries. This is called angioplasty. Or your doctor may suggest using healthy blood vessels to create detours around narrowed or blocked arteries. This is called bypass surgery. Follow-up care is a key part of your treatment and safety. Be sure to make and go to all appointments, and call your doctor if you are having problems. It's also a good idea to know your test results and keep a list of the medicines you take. Where can you learn more? Go to https://[employer]. org and sign in to your [employer] account. Enter [phone] in the Payoff box to learn more about \"Learning About Coronary Artery Disease (CAD). \"     If you do not have an account, please click on the \"Sign Up Now\" link. Current as of: July 22, 2018  Content Version: 12.0  © 6428-4080 Healthwise, Incorporated. Care instructions adapted under license by [facility] ([facility]). If you have questions about a medical condition or this instruction, always ask your healthcare professional. [place] [address] any warranty or liability for your use of this information.

## 2019-05-30 NOTE — PROGRESS NOTES
[facility]        Dear Dr. Nikolas Turner PA-C,    We had the pleasure of seeing Jean-Claude Vazquez in the [facility] office. His primary cardiologist is Dr. Carl Beavers. Chief Complaint: 1 year OV for CAD, hx CABG, HTN, hyperlipidemia, sinus bradycardia  Chief Complaint   Patient presents with    Coronary Artery Disease     Yearly (SR)-  Patient has no complaints       Date of Service: 5/30/2019    HISTORY OF PRESENT ILLNESS:    Jean-Claude Vazquez is a pleasant 67 y.o. male with a past medical history below. Patient presents for 1 year follow-up for CAD (hx CABG 2002), HTN, hyperlipidemia, and T2DM. He routinely goes to cardiac rehab 3 days a week (exercises for 30 minutes on the treadmill each time and does light weight lifting) and he also walks 1-2 miles 3 days a week without any exertional chest pain, significant DEL CASTILLO, palpitations, or dizziness. He denies any recent leg edema, orthopnea, or PND. His BP has been under good control and he has maintained compliance on his medications. He is maintaining NSR.      ALLERGIES:    Allergies   Allergen Reactions    Pcn [Penicillins] Swelling    Percocet [Oxycodone-Acetaminophen] Nausea And Vomiting       PROBLEM LIST:   Patient Active Problem List    Diagnosis Date Noted    Sepsis due to Escherichia coli ([address] 01/07/2019    Bacteremia 12/22/2018    Moderate protein-calorie malnutrition ([address] 10/26/2018    Acute on chronic pancreatitis ([address] 10/25/2018    Pancreatitis, unspecified pancreatitis type 10/25/2018    Hepatic steatosis 10/25/2018    Bilateral renal cysts 10/25/2018    Cystic disease of liver 10/25/2018    History of ERCP 10/25/2018    Pancreatic duct stones 10/25/2018    Acute pancreatitis     Gallstone pancreatitis 09/06/2018    Type 2 diabetes mellitus without complication, without long-term current use of insulin ([address] 10/14/2016    Esophageal stricture     GERD (gastroesophageal reflux disease)     DEL CASTILLO (dyspnea on exertion)     Hypertension     Hyperlipidemia     CAD (coronary artery disease)        PAST MEDICAL HISTORY:       Diagnosis Date    Biliary stone     CAD (coronary artery disease)     follows with Dr. Bev Cooper yearly    Carotid artery stenosis     Dr. Rica Ferrell per patient    Esophageal stricture     when eats has to eat slow    GERD (gastroesophageal reflux disease)     Hyperlipidemia     Hypertension     Pancreatic stent migration     Pancreatic stones     Pancreatitis     several times    Type 2 diabetes mellitus without complication [address] [address] [facility]         SURGICAL HISTORY:   Past Surgical History:   Procedure Laterality Date    CORONARY ARTERY BYPASS GRAFT      DIAGNOSTIC CARDIAC CATH LAB PROCEDURE      ERCP      HERNIA REPAIR      LITHOTRIPSY      OTHER SURGICAL HISTORY      pancreatic stent x2    HI LAP,CHOLECYSTECTOMY N/A 2018    CHOLECYSTECTOMY LAPAROSCOPIC ROBOTIC performed [facility] Rae Jarquin [facility] at [facility]:    Social History     Socioeconomic History    Marital status:       Spouse name: Not on file    Number of children: Not on file    Years of education: Not on file    Highest education level: Not on file   Occupational History    Not on file   Social Needs    Financial resource strain: Not on file    Food insecurity:     Worry: Not on file     Inability: Not on file    Transportation needs:     Medical: Not on file     Non-medical: Not on file   Tobacco Use    Smoking status: Former Smoker     Packs/day: 0.00     Years: 0.00     Pack years: 0.00     Types: Cigars     Start date: 1960     Last attempt to quit: 1998     Years since quittin.4    Smokeless tobacco: Never Used   Substance and Sexual Activity    Alcohol use: No    Drug use: No    Sexual activity: Never   Lifestyle    Physical activity:     Days per week: Not on file     Minutes per session: Not on file    Stress: Mother     High Blood Pressure Mother     Heart Disease Father     High Blood Pressure Father     Cancer Maternal Grandfather           REVIEW OF SYSTEMS:  Cardiac: As per HPI  General: No fever, chills  Pulmonary: As per HPI  HEENT: No visual disturbances, difficult swallowing  GI: No nausea, vomiting, abdominal pain  : No dysuria or hematuria  Endocrine: No thyroid disease + diabetes  Musculoskeletal: VALDES x 4, no focal motor deficits  Skin: Intact, no rashes  Neuro/Psych: No headache or seizures    PHYSICAL EXAMINATION:   Vitals:    05/30/19 1255   BP: 136/88   Pulse: 52   Resp: 18   Weight: 161 lb 3.2 oz (73.1 kg)   Height: 5' 7\" (1.702 m)       Constitutional: A well developed, well nourished 67 y.o. male who is alert, oriented, cooperative and in no apparent distress. Skin: Intact, no rash  Head: Normocephalic, atraumatic  Eyes: EOMI, no conjunctival erythema  ENT: No pharyngeal erythema, MMM, no rhinorrhea  Neck: Supple, no elevated JVP, no carotid bruits  Lungs: Clear to auscultation bilaterally. No wheezes, rales, or rhonchi. Cardiac: Regular rate and rhythm, +S1S2, no murmurs apparent  Abdomen: Soft, nontender, +bowel sounds  Extremities: Moves all extremities x 4, no lower extremity edema  Neurologic: No focal motor deficits apparent, normal mood and affect, alert and oriented x 3  Peripheral Pulses: Intact posterior tibial pulses bilaterally    DATA:   The data collected below information that was obtained, reviewed, analyzed and interpreted today. Echocardiogram: 2011-LVEF 65%, anteroseptal walls appear hypokinetic, stage I diastolic dysfunction, mild AI, trace MR and trace TR. Normal PSA systolic pressure.     Stress test:  9/16/2011-abnormal myocardial perfusion scan with ischemia in apical and inferior apical walls. There is no evidence of transmural infarction.       EKG: SB rate 52, QTc 386, nonspecific st/t wave changes (no acute change from EKG 10/25/18)      ASSESSMENT/PLAN:     1. CAD (CABG x 5 in 2002)- denies any anginal equivalent complaints over the past year  2. HTN- controlled  3. Hyperlipidemia- on zocor, fenofibrate  4. T2DM  5. Hx pancreatic head ductal stone s/p stone extraction  6. GERD/hiatal hernia    - previous cardiac testing reviewed with patient in detail  - continue ASA, Imdur, Losartan  - no BB due to baseline bradycardia  - continue statin, fenofibrate  - continue low fat/heart healthy diet  - continue regular aerobic exercise routine  - he was educated on symptoms that would prompt a sooner return. He will return to see Dr. Stanley Gutiérrez in 1 year or sooner if any issues arise               Thank you for entrusting us to participate in [facility]. If you have any questions, please don't hesitate to call us at [facility].     Sincerely,    Erinn Gee PA-C  [facility]

## 2019-06-04 ENCOUNTER — HOSPITAL ENCOUNTER (OUTPATIENT)
Age: 79
Discharge: HOME | End: 2019-06-04

## 2019-06-04 PROCEDURE — 9900000038 HC CARDIAC REHAB PHASE 3 - MONTHLY

## 2019-06-14 ENCOUNTER — HOSPITAL ENCOUNTER (OUTPATIENT)
Age: 79
Discharge: HOME | End: 2019-06-16
Payer: MEDICARE

## 2019-06-14 LAB — PROSTATE SPECIFIC ANTIGEN: 3.83 NG/ML (ref 0–4)

## 2019-06-14 PROCEDURE — 84153 ASSAY OF PSA TOTAL: CPT

## 2019-06-17 RX ORDER — SIMVASTATIN 40 MG
TABLET ORAL
Qty: 90 TABLET | Refills: 1 | Status: SHIPPED | OUTPATIENT
Start: 2019-06-17 | End: 2019-10-28 | Stop reason: SDUPTHER

## 2019-07-02 ENCOUNTER — HOSPITAL ENCOUNTER (OUTPATIENT)
Age: 79
Discharge: HOME | End: 2019-07-02

## 2019-07-02 PROCEDURE — 9900000038 HC CARDIAC REHAB PHASE 3 - MONTHLY

## 2019-07-10 ENCOUNTER — NURSE ONLY (OUTPATIENT)
Dept: PRIMARY CARE CLINIC | Age: 79
End: 2019-07-10
Payer: MEDICARE

## 2019-07-10 DIAGNOSIS — E11.9 TYPE 2 DIABETES MELLITUS WITHOUT COMPLICATION, WITHOUT LONG-TERM CURRENT USE OF INSULIN (HCC): Primary | ICD-10-CM

## 2019-07-10 LAB — HBA1C MFR BLD: 7 %

## 2019-07-10 PROCEDURE — 83036 HEMOGLOBIN GLYCOSYLATED A1C: CPT | Performed by: PHYSICIAN ASSISTANT

## 2019-08-01 ENCOUNTER — HOSPITAL ENCOUNTER (OUTPATIENT)
Age: 79
Discharge: HOME | End: 2019-08-01

## 2019-08-01 PROCEDURE — 9900000038 HC CARDIAC REHAB PHASE 3 - MONTHLY

## 2019-08-13 RX ORDER — FENOFIBRATE 134 MG/1
CAPSULE ORAL
Qty: 90 CAPSULE | Refills: 1 | Status: SHIPPED | OUTPATIENT
Start: 2019-08-13 | End: 2020-01-09

## 2019-08-22 RX ORDER — ISOSORBIDE MONONITRATE 30 MG/1
TABLET, EXTENDED RELEASE ORAL
Qty: 90 TABLET | Refills: 1 | Status: SHIPPED | OUTPATIENT
Start: 2019-08-22 | End: 2020-01-13

## 2019-09-17 PROCEDURE — 9900000038 HC CARDIAC REHAB PHASE 3 - MONTHLY

## 2019-09-30 ENCOUNTER — HOSPITAL ENCOUNTER (OUTPATIENT)
Dept: CARDIAC REHAB | Age: 79
Discharge: HOME | End: 2019-09-30

## 2019-10-05 PROCEDURE — 9900000038 HC CARDIAC REHAB PHASE 3 - MONTHLY

## 2019-10-11 ENCOUNTER — OFFICE VISIT (OUTPATIENT)
Dept: PRIMARY CARE CLINIC | Age: 79
End: 2019-10-11
Payer: MEDICARE

## 2019-10-11 VITALS
SYSTOLIC BLOOD PRESSURE: 138 MMHG | WEIGHT: 164 LBS | HEART RATE: 72 BPM | BODY MASS INDEX: 25.69 KG/M2 | TEMPERATURE: 98.2 F | DIASTOLIC BLOOD PRESSURE: 84 MMHG | OXYGEN SATURATION: 97 %

## 2019-10-11 DIAGNOSIS — I25.119 CORONARY ARTERY DISEASE INVOLVING NATIVE CORONARY ARTERY OF NATIVE HEART WITH ANGINA PECTORIS (HCC): Chronic | ICD-10-CM

## 2019-10-11 DIAGNOSIS — E78.5 HYPERLIPIDEMIA, UNSPECIFIED HYPERLIPIDEMIA TYPE: Primary | Chronic | ICD-10-CM

## 2019-10-11 DIAGNOSIS — I10 ESSENTIAL HYPERTENSION: Chronic | ICD-10-CM

## 2019-10-11 DIAGNOSIS — E11.9 TYPE 2 DIABETES MELLITUS WITHOUT COMPLICATION, WITHOUT LONG-TERM CURRENT USE OF INSULIN (HCC): Chronic | ICD-10-CM

## 2019-10-11 PROCEDURE — G8484 FLU IMMUNIZE NO ADMIN: HCPCS | Performed by: PHYSICIAN ASSISTANT

## 2019-10-11 PROCEDURE — 1036F TOBACCO NON-USER: CPT | Performed by: PHYSICIAN ASSISTANT

## 2019-10-11 PROCEDURE — G8427 DOCREV CUR MEDS BY ELIG CLIN: HCPCS | Performed by: PHYSICIAN ASSISTANT

## 2019-10-11 PROCEDURE — 99214 OFFICE O/P EST MOD 30 MIN: CPT | Performed by: PHYSICIAN ASSISTANT

## 2019-10-11 PROCEDURE — G8417 CALC BMI ABV UP PARAM F/U: HCPCS | Performed by: PHYSICIAN ASSISTANT

## 2019-10-11 PROCEDURE — 4040F PNEUMOC VAC/ADMIN/RCVD: CPT | Performed by: PHYSICIAN ASSISTANT

## 2019-10-11 PROCEDURE — G8598 ASA/ANTIPLAT THER USED: HCPCS | Performed by: PHYSICIAN ASSISTANT

## 2019-10-11 PROCEDURE — 1123F ACP DISCUSS/DSCN MKR DOCD: CPT | Performed by: PHYSICIAN ASSISTANT

## 2019-10-16 ENCOUNTER — HOSPITAL ENCOUNTER (OUTPATIENT)
Age: 79
Discharge: HOME | End: 2019-10-18
Payer: MEDICARE

## 2019-10-16 ENCOUNTER — NURSE ONLY (OUTPATIENT)
Dept: PRIMARY CARE CLINIC | Age: 79
End: 2019-10-16

## 2019-10-16 DIAGNOSIS — E78.5 HYPERLIPIDEMIA, UNSPECIFIED HYPERLIPIDEMIA TYPE: Primary | ICD-10-CM

## 2019-10-16 DIAGNOSIS — I25.119 CORONARY ARTERY DISEASE INVOLVING NATIVE CORONARY ARTERY OF NATIVE HEART WITH ANGINA PECTORIS (HCC): Chronic | ICD-10-CM

## 2019-10-16 DIAGNOSIS — E78.5 HYPERLIPIDEMIA, UNSPECIFIED HYPERLIPIDEMIA TYPE: Chronic | ICD-10-CM

## 2019-10-16 DIAGNOSIS — E11.9 TYPE 2 DIABETES MELLITUS WITHOUT COMPLICATION, WITHOUT LONG-TERM CURRENT USE OF INSULIN (HCC): Chronic | ICD-10-CM

## 2019-10-16 DIAGNOSIS — I10 ESSENTIAL HYPERTENSION: Chronic | ICD-10-CM

## 2019-10-16 LAB
ALBUMIN SERPL-MCNC: 4.1 G/DL (ref 3.5–5.2)
ALP BLD-CCNC: 31 U/L (ref 40–129)
ALT SERPL-CCNC: 18 U/L (ref 0–40)
ANION GAP SERPL CALCULATED.3IONS-SCNC: 13 MMOL/L (ref 7–16)
AST SERPL-CCNC: 22 U/L (ref 0–39)
BILIRUB SERPL-MCNC: 0.4 MG/DL (ref 0–1.2)
BUN BLDV-MCNC: 25 MG/DL (ref 8–23)
CALCIUM SERPL-MCNC: 9.3 MG/DL (ref 8.6–10.2)
CHLORIDE BLD-SCNC: 109 MMOL/L (ref 98–107)
CHOLESTEROL, TOTAL: 97 MG/DL (ref 0–199)
CO2: 21 MMOL/L (ref 22–29)
CREAT SERPL-MCNC: 1.3 MG/DL (ref 0.7–1.2)
GFR AFRICAN AMERICAN: >60
GFR NON-AFRICAN AMERICAN: 53 ML/MIN/1.73
GLUCOSE BLD-MCNC: 163 MG/DL (ref 74–99)
HBA1C MFR BLD: 7.3 % (ref 4–5.6)
HCT VFR BLD CALC: 43.1 % (ref 37–54)
HDLC SERPL-MCNC: 30 MG/DL
HEMOGLOBIN: 13.5 G/DL (ref 12.5–16.5)
LDL CHOLESTEROL CALCULATED: 56 MG/DL (ref 0–99)
MCH RBC QN AUTO: 31.8 PG (ref 26–35)
MCHC RBC AUTO-ENTMCNC: 31.3 % (ref 32–34.5)
MCV RBC AUTO: 101.4 FL (ref 80–99.9)
PDW BLD-RTO: 13.3 FL (ref 11.5–15)
PLATELET # BLD: 237 E9/L (ref 130–450)
PMV BLD AUTO: 10 FL (ref 7–12)
POTASSIUM SERPL-SCNC: 4.3 MMOL/L (ref 3.5–5)
RBC # BLD: 4.25 E12/L (ref 3.8–5.8)
SODIUM BLD-SCNC: 143 MMOL/L (ref 132–146)
TOTAL PROTEIN: 6.4 G/DL (ref 6.4–8.3)
TRIGL SERPL-MCNC: 56 MG/DL (ref 0–149)
VLDLC SERPL CALC-MCNC: 11 MG/DL
WBC # BLD: 3.9 E9/L (ref 4.5–11.5)

## 2019-10-16 PROCEDURE — 80053 COMPREHEN METABOLIC PANEL: CPT

## 2019-10-16 PROCEDURE — 83036 HEMOGLOBIN GLYCOSYLATED A1C: CPT

## 2019-10-16 PROCEDURE — 85027 COMPLETE CBC AUTOMATED: CPT

## 2019-10-16 PROCEDURE — 80061 LIPID PANEL: CPT

## 2019-10-17 ENCOUNTER — HOSPITAL ENCOUNTER (OUTPATIENT)
Age: 79
Discharge: HOME | End: 2019-10-19
Payer: MEDICARE

## 2019-10-17 DIAGNOSIS — E11.9 TYPE 2 DIABETES MELLITUS WITHOUT COMPLICATION, WITHOUT LONG-TERM CURRENT USE OF INSULIN (HCC): Chronic | ICD-10-CM

## 2019-10-17 LAB
CREATININE URINE: 113 MG/DL (ref 40–278)
MICROALBUMIN UR-MCNC: 45.4 MG/L
MICROALBUMIN/CREAT UR-RTO: 40.2 (ref 0–30)

## 2019-10-17 PROCEDURE — 82570 ASSAY OF URINE CREATININE: CPT

## 2019-10-17 PROCEDURE — 82044 UR ALBUMIN SEMIQUANTITATIVE: CPT

## 2019-10-28 ENCOUNTER — HOSPITAL ENCOUNTER (OUTPATIENT)
Dept: CARDIAC REHAB | Age: 79
Discharge: HOME | End: 2019-10-28

## 2019-10-29 RX ORDER — SIMVASTATIN 40 MG
TABLET ORAL
Qty: 90 TABLET | Refills: 1 | Status: SHIPPED
Start: 2019-10-29 | End: 2020-04-23

## 2019-11-12 ENCOUNTER — CARE COORDINATION (OUTPATIENT)
Dept: CARE COORDINATION | Age: 79
End: 2019-11-12

## 2019-11-13 PROCEDURE — 9900000038 HC CARDIAC REHAB PHASE 3 - MONTHLY

## 2019-11-25 ENCOUNTER — HOSPITAL ENCOUNTER (OUTPATIENT)
Dept: CARDIAC REHAB | Age: 79
Discharge: HOME | End: 2019-11-25

## 2019-11-26 ENCOUNTER — CARE COORDINATION (OUTPATIENT)
Dept: CARE COORDINATION | Age: 79
End: 2019-11-26

## 2019-12-16 PROCEDURE — 9900000038 HC CARDIAC REHAB PHASE 3 - MONTHLY

## 2019-12-26 DIAGNOSIS — I10 ESSENTIAL HYPERTENSION: ICD-10-CM

## 2019-12-27 RX ORDER — LOSARTAN POTASSIUM 100 MG/1
TABLET ORAL
Qty: 90 TABLET | Refills: 3 | Status: SHIPPED
Start: 2019-12-27 | End: 2020-10-08

## 2019-12-30 ENCOUNTER — HOSPITAL ENCOUNTER (OUTPATIENT)
Dept: CARDIAC REHAB | Age: 79
Discharge: HOME | End: 2019-12-30

## 2020-01-09 RX ORDER — FENOFIBRATE 134 MG/1
CAPSULE ORAL
Qty: 90 CAPSULE | Refills: 1 | Status: SHIPPED
Start: 2020-01-09 | End: 2020-05-26

## 2020-01-13 RX ORDER — ISOSORBIDE MONONITRATE 30 MG/1
TABLET, EXTENDED RELEASE ORAL
Qty: 90 TABLET | Refills: 1 | Status: SHIPPED
Start: 2020-01-13 | End: 2020-05-26

## 2020-01-14 RX ORDER — LANCETS 28 GAUGE
EACH MISCELLANEOUS
Qty: 100 EACH | Refills: 5 | Status: SHIPPED
Start: 2020-01-14 | End: 2021-09-10

## 2020-01-18 PROCEDURE — 9900000038 HC CARDIAC REHAB PHASE 3 - MONTHLY

## 2020-01-27 ENCOUNTER — HOSPITAL ENCOUNTER (OUTPATIENT)
Dept: CARDIAC REHAB | Age: 80
Discharge: HOME | End: 2020-01-27

## 2020-02-18 PROCEDURE — 9900000038 HC CARDIAC REHAB PHASE 3 - MONTHLY

## 2020-02-24 ENCOUNTER — HOSPITAL ENCOUNTER (OUTPATIENT)
Dept: CARDIAC REHAB | Age: 80
Discharge: HOME | End: 2020-02-24

## 2020-03-04 PROCEDURE — 9900000038 HC CARDIAC REHAB PHASE 3 - MONTHLY

## 2020-03-30 ENCOUNTER — HOSPITAL ENCOUNTER (OUTPATIENT)
Dept: CARDIAC REHAB | Age: 80
Discharge: HOME | End: 2020-03-30

## 2020-04-23 RX ORDER — SIMVASTATIN 40 MG
TABLET ORAL
Qty: 90 TABLET | Refills: 1 | Status: SHIPPED
Start: 2020-04-23 | End: 2021-06-17 | Stop reason: SDUPTHER

## 2020-05-26 RX ORDER — ISOSORBIDE MONONITRATE 30 MG/1
TABLET, EXTENDED RELEASE ORAL
Qty: 90 TABLET | Refills: 1 | Status: SHIPPED
Start: 2020-05-26 | End: 2020-10-15

## 2020-05-26 RX ORDER — FENOFIBRATE 134 MG/1
CAPSULE ORAL
Qty: 90 CAPSULE | Refills: 1 | Status: SHIPPED
Start: 2020-05-26 | End: 2020-10-15

## 2020-06-08 ENCOUNTER — OFFICE VISIT (OUTPATIENT)
Dept: FAMILY MEDICINE CLINIC | Age: 80
End: 2020-06-08
Payer: MEDICARE

## 2020-06-08 VITALS
OXYGEN SATURATION: 96 % | WEIGHT: 164 LBS | SYSTOLIC BLOOD PRESSURE: 164 MMHG | TEMPERATURE: 98.3 F | DIASTOLIC BLOOD PRESSURE: 84 MMHG | BODY MASS INDEX: 25.69 KG/M2 | HEART RATE: 76 BPM

## 2020-06-08 PROCEDURE — G8427 DOCREV CUR MEDS BY ELIG CLIN: HCPCS | Performed by: PHYSICIAN ASSISTANT

## 2020-06-08 PROCEDURE — 1123F ACP DISCUSS/DSCN MKR DOCD: CPT | Performed by: PHYSICIAN ASSISTANT

## 2020-06-08 PROCEDURE — 99213 OFFICE O/P EST LOW 20 MIN: CPT | Performed by: PHYSICIAN ASSISTANT

## 2020-06-08 PROCEDURE — 93000 ELECTROCARDIOGRAM COMPLETE: CPT | Performed by: PHYSICIAN ASSISTANT

## 2020-06-08 PROCEDURE — 4040F PNEUMOC VAC/ADMIN/RCVD: CPT | Performed by: PHYSICIAN ASSISTANT

## 2020-06-08 PROCEDURE — G8417 CALC BMI ABV UP PARAM F/U: HCPCS | Performed by: PHYSICIAN ASSISTANT

## 2020-06-08 PROCEDURE — 1036F TOBACCO NON-USER: CPT | Performed by: PHYSICIAN ASSISTANT

## 2020-06-08 RX ORDER — METOPROLOL SUCCINATE 50 MG/1
50 TABLET, EXTENDED RELEASE ORAL DAILY
Qty: 30 TABLET | Refills: 0 | Status: SHIPPED
Start: 2020-06-08 | End: 2020-06-26 | Stop reason: SDUPTHER

## 2020-06-08 NOTE — PROGRESS NOTES
activity. Recheck in 2 weeks and will do routine/DM visit at that time. Coronary artery disease involving native coronary artery of native heart with angina pectoris (HCC)  -     metoprolol succinate (TOPROL XL) 50 MG extended release tablet; Take 1 tablet by mouth daily    Return in about 2 weeks (around 6/22/2020) for Hypertension, Diabetes.

## 2020-06-17 ENCOUNTER — OFFICE VISIT (OUTPATIENT)
Dept: CARDIOLOGY CLINIC | Age: 80
End: 2020-06-17
Payer: MEDICARE

## 2020-06-17 VITALS
DIASTOLIC BLOOD PRESSURE: 70 MMHG | HEIGHT: 67 IN | SYSTOLIC BLOOD PRESSURE: 122 MMHG | TEMPERATURE: 97.3 F | RESPIRATION RATE: 16 BRPM | HEART RATE: 66 BPM | WEIGHT: 161.7 LBS | BODY MASS INDEX: 25.38 KG/M2

## 2020-06-17 PROCEDURE — 99213 OFFICE O/P EST LOW 20 MIN: CPT | Performed by: INTERNAL MEDICINE

## 2020-06-17 PROCEDURE — G8417 CALC BMI ABV UP PARAM F/U: HCPCS | Performed by: INTERNAL MEDICINE

## 2020-06-17 PROCEDURE — 1123F ACP DISCUSS/DSCN MKR DOCD: CPT | Performed by: INTERNAL MEDICINE

## 2020-06-17 PROCEDURE — 1036F TOBACCO NON-USER: CPT | Performed by: INTERNAL MEDICINE

## 2020-06-17 PROCEDURE — 93000 ELECTROCARDIOGRAM COMPLETE: CPT | Performed by: INTERNAL MEDICINE

## 2020-06-17 PROCEDURE — 4040F PNEUMOC VAC/ADMIN/RCVD: CPT | Performed by: INTERNAL MEDICINE

## 2020-06-17 PROCEDURE — G8427 DOCREV CUR MEDS BY ELIG CLIN: HCPCS | Performed by: INTERNAL MEDICINE

## 2020-06-17 NOTE — PROGRESS NOTES
OUTPATIENT CARDIOLOGY FOLLOW-UP    Name: Rocael Older    Age: 79 y.o. Primary Care Physician: Khalida Wilcox PA-C    Date of Service: 6/17/2020    Chief Complaint:   Chief Complaint   Patient presents with    Coronary Artery Disease     Patient has no complaints. Interim History:   Mr. Krystyna Burks is a 20-year-old gentleman with history of type II diabetes, hypertension, hyperlipidemia,  CAD and underwent coronary artery bypass surgery ×5 2002 and is here for follow-up visit. He was seen in the office in 5/30/2019. Since his last visit, he has not had any ER visits or hospitalizations. He is complaint with his medications. He exercises on a regular basis at least 5 times a week. He regularly walks without any dyspnea or chest pain. No new cardiac complaints since last cardiology evaluation. He denies recent chest pain, SOB, palpitations, lightheadedness, dizziness, syncope, PND, or orthopnea. SR on EKG.     Review of Systems:   Cardiac: As per HPI  General: No fever, chills  Pulmonary: As per HPI  HEENT: No visual disturbances, difficult swallowing  GI: No nausea, vomiting  Endocrine: No thyroid disease or DM  Musculoskeletal: VALDES x 4, no focal motor deficits  Skin: Intact, no rashes  Neuro/Psych: No headache or seizures    Past Medical History:  Past Medical History:   Diagnosis Date    Biliary stone     CAD (coronary artery disease) 2002    follows with Dr. Inna Beebe yearly    Carotid artery stenosis     Dr. Arabella Zapata per patient    Esophageal stricture     when eats has to eat slow    GERD (gastroesophageal reflux disease)     Hyperlipidemia     Hypertension     Pancreatic stent migration     Pancreatic stones     Pancreatitis     several times    Type 2 diabetes mellitus without complication [address] [address]        Past Surgical History:  Past Surgical History:   Procedure Laterality Date    CORONARY ARTERY BYPASS GRAFT  2002    DIAGNOSTIC CARDIAC CATH LAB PROCEDURE  2002    ERCP      HERNIA REPAIR  2003    LITHOTRIPSY      OTHER SURGICAL HISTORY      pancreatic stent x2    MS LAP,CHOLECYSTECTOMY N/A 2018    CHOLECYSTECTOMY LAPAROSCOPIC ROBOTIC performed [facility] Awais Moct[facility]uma [facility] OR       Family History:  Family History   Problem Relation Age of Onset    Heart Disease Mother     High Blood Pressure Mother     Heart Disease Father     High Blood Pressure Father     Cancer Maternal Grandfather        Social History:  Social History     Socioeconomic History    Marital status:       Spouse name: Not on file    Number of children: Not on file    Years of education: Not on file    Highest education level: Not on file   Occupational History    Not on file   Social Needs    Financial resource strain: Not on file    Food insecurity     Worry: Not on file     Inability: Not on file    Transportation needs     Medical: Not on file     Non-medical: Not on file   Tobacco Use    Smoking status: Former Smoker     Packs/day: 0.00     Years: 0.00     Pack years: 0.00     Types: Cigars     Start date: 1960     Last attempt to quit: 1998     Years since quittin.4    Smokeless tobacco: Never Used   Substance and Sexual Activity    Alcohol use: No    Drug use: No    Sexual activity: Never   Lifestyle    Physical activity     Days per week: Not on file     Minutes per session: Not on file    Stress: Not on file   Relationships    Social connections     Talks on phone: Not on file     Gets together: Not on file     Attends [employer] service: Not on file     Active member of club or organization: Not on file     Attends meetings of clubs or organizations: Not on file     Relationship status: Not on file    Intimate partner violence     Fear of current or ex partner: Not on file     Emotionally abused: Not on file     Physically abused: Not on file     Forced sexual activity: Not on file   Other Topics Concern    Not on file   Social History Narrative    Not pharyngeal erythema, MMM, no rhinorrhea  Neck: Supple, no elevated JVP, no carotid bruits  Lungs: Clear to auscultation bilaterally. No wheezes, rales, or rhonchi. Cardiac: Regular rate and rhythm, +S1S2, no murmurs apparent  Abdomen: Soft, nontender, +bowel sounds  Extremities: Moves all extremities x 4, no lower extremity edema  Neurologic: No focal motor deficits apparent, normal mood and affect, alert and oriented x 3  Peripheral Pulses: Intact posterior tibial pulses bilaterally    Laboratory Tests:  Lab Results   Component Value Date    CREATININE 1.3 (H) 10/16/2019    BUN 25 (H) 10/16/2019     10/16/2019    K 4.3 10/16/2019     (H) 10/16/2019    CO2 21 (L) 10/16/2019     No results found for: MG  Lab Results   Component Value Date    WBC 3.9 (L) 10/16/2019    HGB 13.5 10/16/2019    HCT 43.1 10/16/2019    .4 (H) 10/16/2019     10/16/2019     Lab Results   Component Value Date    ALT 18 10/16/2019    AST 22 10/16/2019    ALKPHOS 31 (L) 10/16/2019    BILITOT 0.4 10/16/2019     Lab Results   Component Value Date    TROPONINI <0.01 10/25/2018     No results found for: INR, PROTIME  No results found for: TSHFT4, TSH  Lab Results   Component Value Date    LABA1C 7.3 (H) 10/16/2019     No results found for: EAG  Lab Results   Component Value Date    CHOL 97 10/16/2019    CHOL 118 04/09/2019    CHOL 109 05/07/2018     Lab Results   Component Value Date    TRIG 56 10/16/2019    TRIG 181 (H) 04/09/2019    TRIG 107 05/07/2018     Lab Results   Component Value Date    HDL 30 10/16/2019    HDL 30 04/09/2019    HDL 25 05/07/2018     Lab Results   Component Value Date    LDLCALC 56 10/16/2019    LDLCALC 52 04/09/2019    LDLCALC 63 05/07/2018     Lab Results   Component Value Date    LABVLDL 11 10/16/2019    LABVLDL 36 04/09/2019    LABVLDL 21 05/07/2018     No results found for: CHOLHDLRATIO    Cardiac Tests:  ECG: Normal sinus rhythm, normal ECG. Changes were noted.     Echocardiogram: 2011-LVEF 65%, anteroseptal walls appear hypokinetic, stage I diastolic dysfunction, mild AI, trace MR and trace TR. Normal PSA systolic pressure. Stress test:  9/16/2011-abnormal myocardial perfusion scan with ischemia in apical and inferior apical walls. There is no evidence of transmural infarction. The ASCVD Risk score (Arvind Sapp., et al., 2013) failed to calculate for the following reasons: The valid total cholesterol range is 130 to 320 mg/dL    Lipid panel on 5/7/2018-total cholesterol 109, HDL 25, LDL 63, triglycerides 107, VLDL 21. ASSESSMENT:  1. CAD, status post CABG ×5 in 2002  2. Hypertension well controlled  3. Hyperlipidemia on statin well controlled  4. Diastolic dysfunction stage I  5. Type II diabetes      Plan:   1. Continue current medical regimen  2. Continue regular exercise  3. Monitor blood pressures at home and cut down the salt intake to less than 2 g. He was advised to call us with the blood pressure readings for the next 1 to 2 weeks. 4. Follow up in one year. The patient's current medication list, allergies, problem list and results of all previously ordered testing were reviewed at today's visit.   Nikolay Pappas MD  [facility]) Cardiology

## 2020-06-25 ENCOUNTER — OFFICE VISIT (OUTPATIENT)
Dept: FAMILY MEDICINE CLINIC | Age: 80
End: 2020-06-25
Payer: MEDICARE

## 2020-06-25 VITALS
BODY MASS INDEX: 25.11 KG/M2 | SYSTOLIC BLOOD PRESSURE: 144 MMHG | RESPIRATION RATE: 16 BRPM | DIASTOLIC BLOOD PRESSURE: 88 MMHG | OXYGEN SATURATION: 98 % | HEIGHT: 67 IN | WEIGHT: 160 LBS | HEART RATE: 68 BPM | TEMPERATURE: 98 F

## 2020-06-25 PROCEDURE — 4040F PNEUMOC VAC/ADMIN/RCVD: CPT | Performed by: PHYSICIAN ASSISTANT

## 2020-06-25 PROCEDURE — 1123F ACP DISCUSS/DSCN MKR DOCD: CPT | Performed by: PHYSICIAN ASSISTANT

## 2020-06-25 PROCEDURE — 99213 OFFICE O/P EST LOW 20 MIN: CPT | Performed by: PHYSICIAN ASSISTANT

## 2020-06-25 PROCEDURE — 1036F TOBACCO NON-USER: CPT | Performed by: PHYSICIAN ASSISTANT

## 2020-06-25 PROCEDURE — G8427 DOCREV CUR MEDS BY ELIG CLIN: HCPCS | Performed by: PHYSICIAN ASSISTANT

## 2020-06-25 PROCEDURE — G8417 CALC BMI ABV UP PARAM F/U: HCPCS | Performed by: PHYSICIAN ASSISTANT

## 2020-06-25 ASSESSMENT — PATIENT HEALTH QUESTIONNAIRE - PHQ9
1. LITTLE INTEREST OR PLEASURE IN DOING THINGS: 0
SUM OF ALL RESPONSES TO PHQ QUESTIONS 1-9: 0
SUM OF ALL RESPONSES TO PHQ9 QUESTIONS 1 & 2: 0
SUM OF ALL RESPONSES TO PHQ QUESTIONS 1-9: 0
2. FEELING DOWN, DEPRESSED OR HOPELESS: 0

## 2020-06-26 RX ORDER — METOPROLOL SUCCINATE 50 MG/1
50 TABLET, EXTENDED RELEASE ORAL DAILY
Qty: 30 TABLET | Refills: 5 | Status: SHIPPED
Start: 2020-06-26 | End: 2020-08-06 | Stop reason: SDUPTHER

## 2020-06-29 ENCOUNTER — HOSPITAL ENCOUNTER (OUTPATIENT)
Dept: CARDIAC REHAB | Age: 80
Discharge: HOME | End: 2020-06-29

## 2020-07-11 PROCEDURE — 9900000038 HC CARDIAC REHAB PHASE 3 - MONTHLY

## 2020-07-17 ENCOUNTER — HOSPITAL ENCOUNTER (OUTPATIENT)
Age: 80
Discharge: HOME | End: 2020-07-19
Payer: MEDICARE

## 2020-07-17 LAB — PROSTATE SPECIFIC ANTIGEN: 4.26 NG/ML (ref 0–4)

## 2020-07-17 PROCEDURE — 84153 ASSAY OF PSA TOTAL: CPT

## 2020-07-20 ENCOUNTER — HOSPITAL ENCOUNTER (OUTPATIENT)
Age: 80
Discharge: HOME | End: 2020-07-22
Payer: MEDICARE

## 2020-07-20 PROCEDURE — 84153 ASSAY OF PSA TOTAL: CPT

## 2020-07-21 LAB — PROSTATE SPECIFIC ANTIGEN: 2.58 NG/ML (ref 0–4)

## 2020-07-27 ENCOUNTER — HOSPITAL ENCOUNTER (OUTPATIENT)
Dept: CARDIAC REHAB | Age: 80
Discharge: HOME | End: 2020-07-27

## 2020-07-28 ENCOUNTER — OFFICE VISIT (OUTPATIENT)
Dept: PRIMARY CARE CLINIC | Age: 80
End: 2020-07-28
Payer: MEDICARE

## 2020-07-28 VITALS
DIASTOLIC BLOOD PRESSURE: 86 MMHG | OXYGEN SATURATION: 97 % | TEMPERATURE: 97.2 F | BODY MASS INDEX: 25.43 KG/M2 | HEIGHT: 67 IN | WEIGHT: 162 LBS | HEART RATE: 68 BPM | SYSTOLIC BLOOD PRESSURE: 128 MMHG

## 2020-07-28 PROBLEM — K85.90 ACUTE ON CHRONIC PANCREATITIS (HCC): Status: RESOLVED | Noted: 2018-10-25 | Resolved: 2020-07-28

## 2020-07-28 PROBLEM — K86.1 ACUTE ON CHRONIC PANCREATITIS (HCC): Status: RESOLVED | Noted: 2018-10-25 | Resolved: 2020-07-28

## 2020-07-28 PROBLEM — E44.0 MODERATE PROTEIN-CALORIE MALNUTRITION (HCC): Chronic | Status: RESOLVED | Noted: 2018-10-26 | Resolved: 2020-07-28

## 2020-07-28 PROCEDURE — 1123F ACP DISCUSS/DSCN MKR DOCD: CPT | Performed by: PHYSICIAN ASSISTANT

## 2020-07-28 PROCEDURE — G0009 ADMIN PNEUMOCOCCAL VACCINE: HCPCS | Performed by: PHYSICIAN ASSISTANT

## 2020-07-28 PROCEDURE — 90732 PPSV23 VACC 2 YRS+ SUBQ/IM: CPT | Performed by: PHYSICIAN ASSISTANT

## 2020-07-28 PROCEDURE — 99214 OFFICE O/P EST MOD 30 MIN: CPT | Performed by: PHYSICIAN ASSISTANT

## 2020-07-28 PROCEDURE — 1036F TOBACCO NON-USER: CPT | Performed by: PHYSICIAN ASSISTANT

## 2020-07-28 PROCEDURE — G8427 DOCREV CUR MEDS BY ELIG CLIN: HCPCS | Performed by: PHYSICIAN ASSISTANT

## 2020-07-28 PROCEDURE — 4040F PNEUMOC VAC/ADMIN/RCVD: CPT | Performed by: PHYSICIAN ASSISTANT

## 2020-07-28 PROCEDURE — G8417 CALC BMI ABV UP PARAM F/U: HCPCS | Performed by: PHYSICIAN ASSISTANT

## 2020-07-28 NOTE — PROGRESS NOTES
Florian Henriquez  [birth year]  7/28/20      HPI:    Patient presents for his 6 month check up. Since last visit with me, has had episodes of higher blood pressure readings, and ws subsequently started on metoprolol. BP is better controlled. He does admi tth emed made him more tired in the day, he takes in AM.  Had his CAD FU with Dr. Gabino Greenfield, and no med changes made. Brings me in a log of his SMBG's, ranges , they are under 130 when he is careful with his diet, on Metformin 1000mg BID. No neuropathy, no foot issues. See retinal doc for retinal bleeding, not retinopathy from DM, follows also with Dr. Doron Olmedo and will see him in 3 weeks. No acute pancreatitis, feels well, on creon and doing well. Watches his diet. Lipids have been controlled on statin and fenofibrate.      Past Medical History:   Diagnosis Date    Biliary stone     CAD (coronary artery disease) 2002    follows with Dr. Gabino Greenfield yearly    Carotid artery stenosis     Dr. Oniel Glover per patient    Esophageal stricture     when eats has to eat slow    GERD (gastroesophageal reflux disease)     Hyperlipidemia     Hypertension     Pancreatic stent migration     Pancreatic stones     Pancreatitis     several times    Type 2 diabetes mellitus without complication ([facility] [address]         Past Surgical History:   Procedure Laterality Date    CORONARY ARTERY BYPASS GRAFT  2002    DIAGNOSTIC CARDIAC CATH LAB PROCEDURE  2002    ERCP      HERNIA REPAIR  2003    LITHOTRIPSY      OTHER SURGICAL HISTORY      pancreatic stent x2    RI LAP,CHOLECYSTECTOMY N/A 9/6/2018    CHOLECYSTECTOMY LAPAROSCOPIC ROBOTIC performed by Svetlana Nazario III, MD at [facility] OR       Current Outpatient Medications   Medication Sig Dispense Refill    metoprolol succinate (TOPROL XL) 50 MG extended release tablet Take 1 tablet by mouth daily 30 tablet 5    Multiple Vitamins-Minerals (CENTRUM SILVER 50+MEN PO) Take by mouth      fenofibrate micronized (LOFIBRA) 134 MG capsule TAKE 1 CAPSULE EVERY DAY 90 capsule 1    isosorbide mononitrate (IMDUR) 30 MG extended release tablet TAKE 1 TABLET EVERY DAY 90 tablet 1    simvastatin (ZOCOR) 40 MG tablet TAKE 1 TABLET EVERY DAY 90 tablet 1    metFORMIN (GLUCOPHAGE) 1000 MG tablet TAKE 1 TABLET TWICE DAILY WITH MEALS 180 tablet 1    blood glucose test strips (FREESTYLE LITE) strip 1 each by Other route daily As needed. 50 each 5    FREESTYLE LANCETS MISC USE DAILY 100 each 5    losartan (COZAAR) 100 MG tablet TAKE 1 TABLET EVERY MORNING 90 tablet 3    ranitidine (ZANTAC) 150 MG tablet Take 150 mg by mouth nightly      lipase-protease-amylase (CREON) 66042-24126 units delayed release capsule Take 1 capsule by mouth 3 times daily (with meals) (Patient taking differently: Take 1 capsule by mouth as needed (usual after he goes out to eat) ) 250 capsule 5    aspirin 81 MG EC tablet Take 81 mg by mouth daily        No current facility-administered medications for this visit. Allergies   Allergen Reactions    Pcn [Penicillins] Swelling    Grass Extracts [Gramineae Pollens]     Percocet [Oxycodone-Acetaminophen] Nausea And Vomiting       Family History   Problem Relation Age of Onset    Heart Disease Mother     High Blood Pressure Mother     Heart Disease Father     High Blood Pressure Father     Cancer Maternal Grandfather        Social History     Socioeconomic History    Marital status:       Spouse name: Not on file    Number of children: Not on file    Years of education: Not on file    Highest education level: Not on file   Occupational History    Not on file   Social Needs    Financial resource strain: Not on file    Food insecurity     Worry: Not on file     Inability: Not on file    Transportation needs     Medical: Not on file     Non-medical: Not on file   Tobacco Use    Smoking status: Former Smoker     Packs/day: 0.00     Years: 0.00     Pack years: 0.00     Types: Cigars     Start date: changes. Allergic/Immunologic: Negative for environmental allergies and food allergies. Neurological: Negative for dizziness, weakness, tremors, syncope, speech difficulty, light-headedness, bowel or bladder control changes, numbness and headaches. Hematological: Negative for adenopathy. Does not bruise/bleed easily. Psychiatric/Behavioral: Negative for suicidal ideas, behavioral problems, sleep disturbance and decreased concentration. The patient is not nervous/anxious. Unless otherwise stated in this report or unable to obtain because of the patient's clinical or mental status as evidenced by the medical record, this patients's positive and negative responses for Review of Systems, constitutional, psych, eyes, ENT, cardiovascular, respiratory, gastrointestinal, neurological, genitourinary, musculoskeletal, integument systems and systems related to the presenting problem are either stated in the preceding or were not pertinent or were negative for the symptoms and/or complaints related to the medical problem. Physical Exam:   Vitals:    07/28/20 1532 07/28/20 1536   BP: 126/84 128/86   Site: Right Upper Arm Left Upper Arm   Pulse: 68    Temp: 97.2 °F (36.2 °C)    SpO2: 97%    Weight: 162 lb (73.5 kg)    Height: 5' 7\" (1.702 m)      Constitutional:  A and O x 3, in NAD. Afebrile. Appears stated age. Head:  NCAT. Eyes:  PERRLA, EOMI without nystagmus. Conjunctiva normal. Sclera anicteric. Ears:  External ears without lesions. TM's clear bilaterally. Throat:  Pharynx without lesions. Airway patient. Mucous membranes pink and moist without lesions. Neck:  Supple. Nontender, no lymphadenopathy noted, no thyromegaly. Lungs:  Clear to auscultation and breath sounds equal.   Heart:  Regular rate and rhythm, normal S1 and S2, no m/r/g.  UE and LE distal pulses intact. No edema. Abdomen:  Soft, NTND, NABS x 4, no masses or organomegaly, no rebound or guarding.     MS: Normal, painless taking. Reviewed age and gender appropriate health screening exams and vaccinations. Advised patient regarding importance of keeping up with recommended health maintenance and to schedule as soon as possible if overdue, as this is important in assessing for undiagnosed pathology, especially cancer, as well as protecting against potentially harmful/life threatening disease. Patient and/or guardian verbalizes understanding and agrees with above counseling, assessment and plan. All questions answered.     Lulu Krishnamurthy PA-C

## 2020-07-30 ENCOUNTER — HOSPITAL ENCOUNTER (OUTPATIENT)
Age: 80
Discharge: HOME | End: 2020-07-30
Payer: MEDICARE

## 2020-07-30 LAB
ALBUMIN SERPL-MCNC: 4.1 G/DL (ref 3.5–5.2)
ALP BLD-CCNC: 33 U/L (ref 40–129)
ALT SERPL-CCNC: 19 U/L (ref 0–40)
ANION GAP SERPL CALCULATED.3IONS-SCNC: 14 MMOL/L (ref 7–16)
AST SERPL-CCNC: 20 U/L (ref 0–39)
BILIRUB SERPL-MCNC: 0.7 MG/DL (ref 0–1.2)
BUN BLDV-MCNC: 24 MG/DL (ref 8–23)
CALCIUM SERPL-MCNC: 9.1 MG/DL (ref 8.6–10.2)
CHLORIDE BLD-SCNC: 107 MMOL/L (ref 98–107)
CHOLESTEROL, TOTAL: 103 MG/DL (ref 0–199)
CO2: 21 MMOL/L (ref 22–29)
CREAT SERPL-MCNC: 1.2 MG/DL (ref 0.7–1.2)
GFR AFRICAN AMERICAN: >60
GFR NON-AFRICAN AMERICAN: 58 ML/MIN/1.73
GLUCOSE BLD-MCNC: 179 MG/DL (ref 74–99)
HBA1C MFR BLD: 8.6 % (ref 4–5.6)
HCT VFR BLD CALC: 43 % (ref 37–54)
HDLC SERPL-MCNC: 34 MG/DL
HEMOGLOBIN: 13.8 G/DL (ref 12.5–16.5)
LDL CHOLESTEROL CALCULATED: 52 MG/DL (ref 0–99)
MCH RBC QN AUTO: 30.8 PG (ref 26–35)
MCHC RBC AUTO-ENTMCNC: 32.1 % (ref 32–34.5)
MCV RBC AUTO: 96 FL (ref 80–99.9)
PDW BLD-RTO: 13.7 FL (ref 11.5–15)
PLATELET # BLD: 234 E9/L (ref 130–450)
PMV BLD AUTO: 9.4 FL (ref 7–12)
POTASSIUM SERPL-SCNC: 4.3 MMOL/L (ref 3.5–5)
RBC # BLD: 4.48 E12/L (ref 3.8–5.8)
SODIUM BLD-SCNC: 142 MMOL/L (ref 132–146)
TOTAL PROTEIN: 6.2 G/DL (ref 6.4–8.3)
TRIGL SERPL-MCNC: 87 MG/DL (ref 0–149)
VLDLC SERPL CALC-MCNC: 17 MG/DL
WBC # BLD: 7.2 E9/L (ref 4.5–11.5)

## 2020-07-30 PROCEDURE — 85027 COMPLETE CBC AUTOMATED: CPT

## 2020-07-30 PROCEDURE — 80061 LIPID PANEL: CPT

## 2020-07-30 PROCEDURE — 36415 COLL VENOUS BLD VENIPUNCTURE: CPT

## 2020-07-30 PROCEDURE — 83036 HEMOGLOBIN GLYCOSYLATED A1C: CPT

## 2020-07-30 PROCEDURE — 80053 COMPREHEN METABOLIC PANEL: CPT

## 2020-07-31 RX ORDER — EMPAGLIFLOZIN 10 MG/1
1 TABLET, FILM COATED ORAL DAILY
Qty: 30 TABLET | Refills: 5 | Status: SHIPPED
Start: 2020-07-31 | End: 2020-08-03 | Stop reason: SDUPTHER

## 2020-08-03 RX ORDER — EMPAGLIFLOZIN 10 MG/1
1 TABLET, FILM COATED ORAL DAILY
Qty: 30 TABLET | Refills: 5 | Status: SHIPPED
Start: 2020-08-03 | End: 2020-08-06 | Stop reason: SDUPTHER

## 2020-08-06 RX ORDER — EMPAGLIFLOZIN 10 MG/1
1 TABLET, FILM COATED ORAL DAILY
Qty: 30 TABLET | Refills: 5 | Status: SHIPPED
Start: 2020-08-06 | End: 2020-08-11 | Stop reason: SDUPTHER

## 2020-08-06 RX ORDER — METOPROLOL SUCCINATE 50 MG/1
50 TABLET, EXTENDED RELEASE ORAL DAILY
Qty: 30 TABLET | Refills: 5 | Status: SHIPPED
Start: 2020-08-06 | End: 2020-08-11 | Stop reason: SDUPTHER

## 2020-08-08 PROCEDURE — 9900000038 HC CARDIAC REHAB PHASE 3 - MONTHLY

## 2020-08-11 RX ORDER — EMPAGLIFLOZIN 10 MG/1
1 TABLET, FILM COATED ORAL DAILY
Qty: 90 TABLET | Refills: 1 | Status: SHIPPED
Start: 2020-08-11 | End: 2021-03-15

## 2020-08-11 RX ORDER — METOPROLOL SUCCINATE 50 MG/1
50 TABLET, EXTENDED RELEASE ORAL DAILY
Qty: 90 TABLET | Refills: 1 | Status: SHIPPED
Start: 2020-08-11 | End: 2021-03-08

## 2020-08-28 RX ORDER — SIMVASTATIN 40 MG
TABLET ORAL
Qty: 90 TABLET | Refills: 1 | OUTPATIENT
Start: 2020-08-28

## 2020-08-31 ENCOUNTER — HOSPITAL ENCOUNTER (OUTPATIENT)
Dept: CARDIAC REHAB | Age: 80
Discharge: HOME | End: 2020-08-31

## 2020-09-10 PROCEDURE — 9900000038 HC CARDIAC REHAB PHASE 3 - MONTHLY

## 2020-09-28 ENCOUNTER — HOSPITAL ENCOUNTER (OUTPATIENT)
Dept: CARDIAC REHAB | Age: 80
Discharge: HOME | End: 2020-09-28

## 2020-10-05 PROCEDURE — 9900000038 HC CARDIAC REHAB PHASE 3 - MONTHLY

## 2020-10-15 RX ORDER — ISOSORBIDE MONONITRATE 30 MG/1
TABLET, EXTENDED RELEASE ORAL
Qty: 90 TABLET | Refills: 1 | Status: SHIPPED
Start: 2020-10-15 | End: 2021-02-23

## 2020-10-15 RX ORDER — FENOFIBRATE 134 MG/1
CAPSULE ORAL
Qty: 90 CAPSULE | Refills: 1 | Status: SHIPPED
Start: 2020-10-15 | End: 2021-02-23

## 2020-10-26 ENCOUNTER — HOSPITAL ENCOUNTER (OUTPATIENT)
Dept: CARDIAC REHAB | Age: 80
Discharge: HOME | End: 2020-10-26

## 2020-11-07 PROCEDURE — 9900000038 HC CARDIAC REHAB PHASE 3 - MONTHLY

## 2020-11-30 ENCOUNTER — HOSPITAL ENCOUNTER (OUTPATIENT)
Dept: CARDIAC REHAB | Age: 80
Discharge: HOME | End: 2020-11-30

## 2020-12-03 PROCEDURE — 9900000038 HC CARDIAC REHAB PHASE 3 - MONTHLY

## 2020-12-28 ENCOUNTER — HOSPITAL ENCOUNTER (OUTPATIENT)
Dept: CARDIAC REHAB | Age: 80
Discharge: HOME | End: 2020-12-28

## 2021-01-06 DIAGNOSIS — E11.9 TYPE 2 DIABETES MELLITUS WITHOUT COMPLICATION, WITHOUT LONG-TERM CURRENT USE OF INSULIN (HCC): ICD-10-CM

## 2021-01-13 PROCEDURE — 9900000038 HC CARDIAC REHAB PHASE 3 - MONTHLY

## 2021-01-25 ENCOUNTER — HOSPITAL ENCOUNTER (OUTPATIENT)
Dept: CARDIAC REHAB | Age: 81
Discharge: HOME | End: 2021-01-25

## 2021-01-29 ENCOUNTER — OFFICE VISIT (OUTPATIENT)
Dept: PRIMARY CARE CLINIC | Age: 81
End: 2021-01-29
Payer: MEDICARE

## 2021-01-29 VITALS
SYSTOLIC BLOOD PRESSURE: 114 MMHG | RESPIRATION RATE: 18 BRPM | OXYGEN SATURATION: 97 % | WEIGHT: 156 LBS | DIASTOLIC BLOOD PRESSURE: 60 MMHG | TEMPERATURE: 97.2 F | HEART RATE: 64 BPM | BODY MASS INDEX: 24.43 KG/M2

## 2021-01-29 DIAGNOSIS — K21.9 GASTROESOPHAGEAL REFLUX DISEASE, UNSPECIFIED WHETHER ESOPHAGITIS PRESENT: ICD-10-CM

## 2021-01-29 DIAGNOSIS — I25.119 CORONARY ARTERY DISEASE INVOLVING NATIVE CORONARY ARTERY OF NATIVE HEART WITH ANGINA PECTORIS (HCC): ICD-10-CM

## 2021-01-29 DIAGNOSIS — I10 ESSENTIAL HYPERTENSION: ICD-10-CM

## 2021-01-29 DIAGNOSIS — E78.5 HYPERLIPIDEMIA, UNSPECIFIED HYPERLIPIDEMIA TYPE: ICD-10-CM

## 2021-01-29 DIAGNOSIS — E11.9 TYPE 2 DIABETES MELLITUS WITHOUT COMPLICATION, WITHOUT LONG-TERM CURRENT USE OF INSULIN (HCC): Primary | ICD-10-CM

## 2021-01-29 DIAGNOSIS — Z98.890 HISTORY OF ERCP: ICD-10-CM

## 2021-01-29 DIAGNOSIS — E11.9 TYPE 2 DIABETES MELLITUS WITHOUT COMPLICATION, WITHOUT LONG-TERM CURRENT USE OF INSULIN (HCC): ICD-10-CM

## 2021-01-29 LAB
ALBUMIN SERPL-MCNC: 4.2 G/DL (ref 3.5–5.2)
ALP BLD-CCNC: 36 U/L (ref 40–129)
ALT SERPL-CCNC: 23 U/L (ref 0–40)
ANION GAP SERPL CALCULATED.3IONS-SCNC: 14 MMOL/L (ref 7–16)
AST SERPL-CCNC: 24 U/L (ref 0–39)
BILIRUB SERPL-MCNC: 0.7 MG/DL (ref 0–1.2)
BUN BLDV-MCNC: 41 MG/DL (ref 8–23)
CALCIUM SERPL-MCNC: 9.2 MG/DL (ref 8.6–10.2)
CHLORIDE BLD-SCNC: 106 MMOL/L (ref 98–107)
CHOLESTEROL, TOTAL: 125 MG/DL (ref 0–199)
CO2: 21 MMOL/L (ref 22–29)
CREAT SERPL-MCNC: 1.5 MG/DL (ref 0.7–1.2)
GFR AFRICAN AMERICAN: 55
GFR NON-AFRICAN AMERICAN: 45 ML/MIN/1.73
GLUCOSE BLD-MCNC: 138 MG/DL (ref 74–99)
HBA1C MFR BLD: 8.4 % (ref 4–5.6)
HCT VFR BLD CALC: 50.3 % (ref 37–54)
HDLC SERPL-MCNC: 36 MG/DL
HEMOGLOBIN: 15.4 G/DL (ref 12.5–16.5)
LDL CHOLESTEROL CALCULATED: 69 MG/DL (ref 0–99)
MCH RBC QN AUTO: 30.6 PG (ref 26–35)
MCHC RBC AUTO-ENTMCNC: 30.6 % (ref 32–34.5)
MCV RBC AUTO: 100 FL (ref 80–99.9)
PDW BLD-RTO: 13.8 FL (ref 11.5–15)
PLATELET # BLD: 245 E9/L (ref 130–450)
PMV BLD AUTO: 9.8 FL (ref 7–12)
POTASSIUM SERPL-SCNC: 5 MMOL/L (ref 3.5–5)
RBC # BLD: 5.03 E12/L (ref 3.8–5.8)
SODIUM BLD-SCNC: 141 MMOL/L (ref 132–146)
TOTAL PROTEIN: 6.6 G/DL (ref 6.4–8.3)
TRIGL SERPL-MCNC: 101 MG/DL (ref 0–149)
VLDLC SERPL CALC-MCNC: 20 MG/DL
WBC # BLD: 6.2 E9/L (ref 4.5–11.5)

## 2021-01-29 PROCEDURE — 1036F TOBACCO NON-USER: CPT | Performed by: PHYSICIAN ASSISTANT

## 2021-01-29 PROCEDURE — G8427 DOCREV CUR MEDS BY ELIG CLIN: HCPCS | Performed by: PHYSICIAN ASSISTANT

## 2021-01-29 PROCEDURE — 99214 OFFICE O/P EST MOD 30 MIN: CPT | Performed by: PHYSICIAN ASSISTANT

## 2021-01-29 PROCEDURE — 1123F ACP DISCUSS/DSCN MKR DOCD: CPT | Performed by: PHYSICIAN ASSISTANT

## 2021-01-29 PROCEDURE — G8420 CALC BMI NORM PARAMETERS: HCPCS | Performed by: PHYSICIAN ASSISTANT

## 2021-01-29 PROCEDURE — G8484 FLU IMMUNIZE NO ADMIN: HCPCS | Performed by: PHYSICIAN ASSISTANT

## 2021-01-29 PROCEDURE — 4040F PNEUMOC VAC/ADMIN/RCVD: CPT | Performed by: PHYSICIAN ASSISTANT

## 2021-01-29 ASSESSMENT — PATIENT HEALTH QUESTIONNAIRE - PHQ9
SUM OF ALL RESPONSES TO PHQ QUESTIONS 1-9: 0
2. FEELING DOWN, DEPRESSED OR HOPELESS: 0
SUM OF ALL RESPONSES TO PHQ QUESTIONS 1-9: 0

## 2021-01-29 NOTE — PROGRESS NOTES
Luke Jonathon  [birth year]  1/29/21      HPI:  Patient presents for his 6 month check up. He brings in logs of his sugars, and BP, weight. SMBG's brought in log reveals some fasting and non-fasting ranges 123-177. Does admit that he was eating more cookies around koby, but besides this does watch what he eats. Notes at times feeling of polydipsia and polyuria, only when sugars are up, but did start on Jardiance     6 months ago--he has noticed more polyuria, no UTI like Sx, no itching, burning. He has stopped sugary drinks, and notes on more light, and sugar free drinks. BP log shows well controlled BPs. Weights are stable. CAD, stable, sees cardiology upcoming next month. Still goes to cardiac rehab 3 days a week.      Past Medical History:   Diagnosis Date    Biliary stone     CAD (coronary artery disease) 2002    follows with Dr. Roxanna Brady yearly    Carotid artery stenosis     Dr. Taylor Zapien per patient    Esophageal stricture     when eats has to eat slow    GERD (gastroesophageal reflux disease)     Hyperlipidemia     Hypertension     Pancreatic stent migration     Pancreatic stones     Pancreatitis     several times    Type 2 diabetes mellitus without complication ([facility] [address]         Past Surgical History:   Procedure Laterality Date    CORONARY ARTERY BYPASS GRAFT  2002    DIAGNOSTIC CARDIAC CATH LAB PROCEDURE  2002    ERCP      HERNIA REPAIR  2003    LITHOTRIPSY      OTHER SURGICAL HISTORY      pancreatic stent x2    VT LAP,CHOLECYSTECTOMY N/A 9/6/2018    CHOLECYSTECTOMY LAPAROSCOPIC ROBOTIC performed by Leanne Salazar MD at [facility] OR       Current Outpatient Medications   Medication Sig Dispense Refill    metFORMIN (GLUCOPHAGE) 1000 MG tablet TAKE 1 TABLET TWICE DAILY WITH MEALS 180 tablet 1    isosorbide mononitrate (IMDUR) 30 MG extended release tablet TAKE 1 TABLET EVERY DAY 90 tablet 1    fenofibrate micronized (LOFIBRA) 134 MG capsule TAKE 1 CAPSULE EVERY DAY 90 capsule 1    losartan (COZAAR) 100 MG tablet TAKE 1 TABLET EVERY MORNING 90 tablet 1    empagliflozin (JARDIANCE) 10 MG tablet Take 1 tablet by mouth daily 90 tablet 1    metoprolol succinate (TOPROL XL) 50 MG extended release tablet Take 1 tablet by mouth daily 90 tablet 1    Multiple Vitamins-Minerals (CENTRUM SILVER 50+MEN PO) Take by mouth      simvastatin (ZOCOR) 40 MG tablet TAKE 1 TABLET EVERY DAY 90 tablet 1    blood glucose test strips (FREESTYLE LITE) strip 1 each by Other route daily As needed. 50 each 5    FREESTYLE LANCETS MISC USE DAILY 100 each 5    ranitidine (ZANTAC) 150 MG tablet Take 150 mg by mouth nightly      lipase-protease-amylase (CREON) 27201-47431 units delayed release capsule Take 1 capsule by mouth 3 times daily (with meals) (Patient taking differently: Take 1 capsule by mouth as needed (usual after he goes out to eat) ) 250 capsule 5    aspirin 81 MG EC tablet Take 81 mg by mouth daily        No current facility-administered medications for this visit. Allergies   Allergen Reactions    Pcn [Penicillins] Swelling    Grass Extracts [Gramineae Pollens]     Percocet [Oxycodone-Acetaminophen] Nausea And Vomiting       Family History   Problem Relation Age of Onset    Heart Disease Mother     High Blood Pressure Mother     Heart Disease Father     High Blood Pressure Father     Cancer Maternal Grandfather        Social History     Socioeconomic History    Marital status:       Spouse name: Not on file    Number of children: Not on file    Years of education: Not on file    Highest education level: Not on file   Occupational History    Not on file   Social Needs    Financial resource strain: Not on file    Food insecurity     Worry: Not on file     Inability: Not on file    Transportation needs     Medical: Not on file     Non-medical: Not on file   Tobacco Use    Smoking status: Former Smoker     Packs/day: 0.00     Years: 0.00     Pack years: 0.00 Types: Cigars     Start date: 1960     Quit date: 1998     Years since quittin.0    Smokeless tobacco: Never Used   Substance and Sexual Activity    Alcohol use: No    Drug use: No    Sexual activity: Never   Lifestyle    Physical activity     Days per week: Not on file     Minutes per session: Not on file    Stress: Not on file   Relationships    Social connections     Talks on phone: Not on file     Gets together: Not on file     Attends [employer] service: Not on file     Active member of club or organization: Not on file     Attends meetings of clubs or organizations: Not on file     Relationship status: Not on file    Intimate partner violence     Fear of current or ex partner: Not on file     Emotionally abused: Not on file     Physically abused: Not on file     Forced sexual activity: Not on file   Other Topics Concern    Not on file   Social History Narrative    Not on file       Review of Systems :     Constitutional: Negative for fatigue, malaise, fever, chills, appetite change and unexpected weight change. HENT: Negative for congestion, ear discharge, ear pain, facial swelling, mouth sores, postnasal drip, rhinorrhea, sinus pressure, sore throat, tinnitus and trouble swallowing. Eyes: Negative for vision changes, double vision, pain  Respiratory: Negative for cough, chest tightness, shortness of breath, chest heaviness, pleuritic pain,  wheezing. Cardiovascular: Negative for diaphoresis, chest pain, palpitations, or edema   Gastrointestinal: Negative for nausea, vomiting, abdominal pain, diarrhea, constipation, blood in stool, melena, changes in bowel habits. Endocrine: Negative for polydipsia, polyphagia and polyuria. No heat or cold intolerance. Genitourinary: Negative for dysuria, urgency, frequency, hematuria, difficulty urinating, nocturia. Musculoskeletal: pocc R hip pain, but doesn't want to do anything right now.  Otherwise Negative for myalgias, back pain, joint swelling and arthralgias. No gait disturbance. Skin: Negative for rash, lesions, hair or nail changes. Allergic/Immunologic: Negative for environmental allergies and food allergies. Neurological: Negative for dizziness, weakness, tremors, syncope, speech difficulty, light-headedness, bowel or bladder control changes, numbness and headaches. Hematological: Negative for adenopathy. Does not bruise/bleed easily. Psychiatric/Behavioral: Negative for suicidal ideas, behavioral problems, sleep disturbance and decreased concentration. The patient is not nervous/anxious. Unless otherwise stated in this report or unable to obtain because of the patient's clinical or mental status as evidenced by the medical record, this patients's positive and negative responses for Review of Systems, constitutional, psych, eyes, ENT, cardiovascular, respiratory, gastrointestinal, neurological, genitourinary, musculoskeletal, integument systems and systems related to the presenting problem are either stated in the preceding or were not pertinent or were negative for the symptoms and/or complaints related to the medical problem. Physical Exam:   Vitals:    01/29/21 0944   BP: 114/60   Pulse: 64   Resp: 18   Temp: 97.2 °F (36.2 °C)   SpO2: 97%   Weight: 156 lb (70.8 kg)     Constitutional:  A and O x 3, in NAD. Afebrile. Appears stated age. Head:  NCAT. Eyes:  PERRLA, EOMI without nystagmus. Conjunctiva normal. Sclera anicteric. Ears:  External ears without lesions. TM's clear bilaterally. Throat:  Pharynx without lesions. Airway patient. Mucous membranes pink and moist without lesions. Neck:  Supple. Nontender, no lymphadenopathy noted, no thyromegaly. Lungs:  Clear to auscultation and breath sounds equal.  Heart:  Regular rate and rhythm, normal S1 and S2, no m/r/g.UE and LE distal pulses intact. Abdomen:  Soft, NTND, NABS x 4, no masses or organomegaly, no rebound or guarding.     MS: No neurovascular deficit. 5/5 strength in UE's/LE's/ bilaterally. Skin:  No abrasions, ecchymoses, or lacerations unless noted elsewhere. Extremities  No cyanosis, clubbing, or edema noted. Neurological:   Oriented. Motor functions intact. Assessment/Plan:     Haley Mccormack was seen today for 6 month follow-up. Diagnoses and all orders for this visit:    Type 2 diabetes mellitus without complication, without long-term current use of insulin ([facility]  -     COMPREHENSIVE METABOLIC PANEL; Future  -     CBC; Future  -     LIPID PANEL; Future  -     HEMOGLOBIN A1C; Future  -     MICROALBUMIN / CREATININE URINE RATIO; Future   --we will see if jardiance is helpful, if not I may switch him to a SFU, as he notes this drug to be very expensive. Essential hypertension  -     COMPREHENSIVE METABOLIC PANEL; Future  -     LIPID PANEL; Future   -controlled. Hyperlipidemia, unspecified hyperlipidemia type  -     COMPREHENSIVE METABOLIC PANEL; Future  -     LIPID PANEL; Future   -stable, controlled. Sees cardiology as well. Coronary artery disease involving native coronary artery of native heart with angina pectoris ([facility]   -stable, FU with cardiologist.   Gastroesophageal reflux disease, unspecified whether esophagitis present  History of ERCP   -on Creon, stable, FU with GI as advised. FU with Dr. Elton Collins as advised. Return in about 6 months (around 7/29/2021). Counseled regarding above diagnosis, including possible risks and complications,  especially if left uncontrolled. Counseled regarding the possible side effects, risks, benefits and alternatives to treatment; patient and/or guardian verbalizes understanding, agrees, feels comfortable with and wishes to proceed with above treatment plan. Advised patient to call with any new medication issues, and read all Rx info from pharmacy to assure aware of all possible risks and side effects of medication before taking.      Reviewed age and gender appropriate health screening exams and vaccinations. Advised patient regarding importance of keeping up with recommended health maintenance and to schedule as soon as possible if overdue, as this is important in assessing for undiagnosed pathology, especially cancer, as well as protecting against potentially harmful/life threatening disease. Patient and/or guardian verbalizes understanding and agrees with above counseling, assessment and plan. All questions answered.     Clemente Ruelas PA-C

## 2021-02-02 RX ORDER — GLIMEPIRIDE 2 MG/1
2 TABLET ORAL EVERY MORNING
Qty: 90 TABLET | Refills: 1 | Status: SHIPPED
Start: 2021-02-02 | End: 2021-06-10

## 2021-02-03 ENCOUNTER — IMMUNIZATION (OUTPATIENT)
Dept: PRIMARY CARE CLINIC | Age: 81
End: 2021-02-03
Payer: MEDICARE

## 2021-02-03 DIAGNOSIS — Z23 NEED FOR VACCINATION: Primary | ICD-10-CM

## 2021-02-03 PROCEDURE — 0001A COVID-19, PFIZER VACCINE 30MCG/0.3ML DOSE: CPT | Performed by: NURSE PRACTITIONER

## 2021-02-03 PROCEDURE — 91300 COVID-19, PFIZER VACCINE 30MCG/0.3ML DOSE: CPT | Performed by: NURSE PRACTITIONER

## 2021-02-04 PROCEDURE — 9900000038 HC CARDIAC REHAB PHASE 3 - MONTHLY

## 2021-02-22 ENCOUNTER — HOSPITAL ENCOUNTER (OUTPATIENT)
Dept: CARDIAC REHAB | Age: 81
Discharge: HOME | End: 2021-02-22

## 2021-02-23 RX ORDER — FENOFIBRATE 134 MG/1
CAPSULE ORAL
Qty: 90 CAPSULE | Refills: 1 | Status: SHIPPED
Start: 2021-02-23 | End: 2021-07-15

## 2021-02-23 RX ORDER — ISOSORBIDE MONONITRATE 30 MG/1
TABLET, EXTENDED RELEASE ORAL
Qty: 90 TABLET | Refills: 1 | Status: SHIPPED
Start: 2021-02-23 | End: 2021-07-15

## 2021-02-24 ENCOUNTER — IMMUNIZATION (OUTPATIENT)
Dept: PRIMARY CARE CLINIC | Age: 81
End: 2021-02-24
Payer: MEDICARE

## 2021-02-24 PROCEDURE — 0002A COVID-19, PFIZER VACCINE 30MCG/0.3ML DOSE: CPT | Performed by: NURSE PRACTITIONER

## 2021-02-24 PROCEDURE — 91300 COVID-19, PFIZER VACCINE 30MCG/0.3ML DOSE: CPT | Performed by: NURSE PRACTITIONER

## 2021-03-03 PROCEDURE — 9900000038 HC CARDIAC REHAB PHASE 3 - MONTHLY

## 2021-03-08 DIAGNOSIS — I10 ESSENTIAL HYPERTENSION: ICD-10-CM

## 2021-03-08 DIAGNOSIS — I10 HYPERTENSION, UNSPECIFIED TYPE: ICD-10-CM

## 2021-03-08 DIAGNOSIS — I25.119 CORONARY ARTERY DISEASE INVOLVING NATIVE CORONARY ARTERY OF NATIVE HEART WITH ANGINA PECTORIS (HCC): Chronic | ICD-10-CM

## 2021-03-08 RX ORDER — LOSARTAN POTASSIUM 100 MG/1
TABLET ORAL
Qty: 90 TABLET | Refills: 1 | Status: SHIPPED
Start: 2021-03-08 | End: 2021-07-26

## 2021-03-08 RX ORDER — METOPROLOL SUCCINATE 50 MG/1
TABLET, EXTENDED RELEASE ORAL
Qty: 90 TABLET | Refills: 1 | Status: SHIPPED
Start: 2021-03-08 | End: 2021-07-26

## 2021-03-15 RX ORDER — EMPAGLIFLOZIN 10 MG/1
TABLET, FILM COATED ORAL
Qty: 90 TABLET | Refills: 1 | Status: SHIPPED
Start: 2021-03-15 | End: 2021-10-19

## 2021-03-29 ENCOUNTER — HOSPITAL ENCOUNTER (OUTPATIENT)
Dept: CARDIAC REHAB | Age: 81
Discharge: HOME | End: 2021-03-29

## 2021-04-08 PROCEDURE — 9900000038 HC CARDIAC REHAB PHASE 3 - MONTHLY

## 2021-04-26 ENCOUNTER — HOSPITAL ENCOUNTER (OUTPATIENT)
Dept: CARDIAC REHAB | Age: 81
Discharge: HOME | End: 2021-04-26

## 2021-05-05 PROCEDURE — 9900000038 HC CARDIAC REHAB PHASE 3 - MONTHLY

## 2021-05-24 ENCOUNTER — HOSPITAL ENCOUNTER (OUTPATIENT)
Dept: CARDIAC REHAB | Age: 81
Discharge: HOME | End: 2021-05-24

## 2021-05-24 DIAGNOSIS — E11.9 TYPE 2 DIABETES MELLITUS WITHOUT COMPLICATION, WITHOUT LONG-TERM CURRENT USE OF INSULIN (HCC): ICD-10-CM

## 2021-05-25 ENCOUNTER — OFFICE VISIT (OUTPATIENT)
Dept: PRIMARY CARE CLINIC | Age: 81
End: 2021-05-25
Payer: MEDICARE

## 2021-05-25 VITALS
SYSTOLIC BLOOD PRESSURE: 120 MMHG | WEIGHT: 158 LBS | TEMPERATURE: 98 F | BODY MASS INDEX: 24.75 KG/M2 | DIASTOLIC BLOOD PRESSURE: 74 MMHG | HEART RATE: 57 BPM | OXYGEN SATURATION: 98 %

## 2021-05-25 DIAGNOSIS — I25.119 CORONARY ARTERY DISEASE INVOLVING NATIVE CORONARY ARTERY OF NATIVE HEART WITH ANGINA PECTORIS (HCC): ICD-10-CM

## 2021-05-25 DIAGNOSIS — E11.9 TYPE 2 DIABETES MELLITUS WITHOUT COMPLICATION, WITHOUT LONG-TERM CURRENT USE OF INSULIN (HCC): Chronic | ICD-10-CM

## 2021-05-25 DIAGNOSIS — I10 ESSENTIAL HYPERTENSION: ICD-10-CM

## 2021-05-25 DIAGNOSIS — E11.9 TYPE 2 DIABETES MELLITUS WITHOUT COMPLICATION, WITHOUT LONG-TERM CURRENT USE OF INSULIN (HCC): Primary | Chronic | ICD-10-CM

## 2021-05-25 DIAGNOSIS — E78.5 HYPERLIPIDEMIA, UNSPECIFIED HYPERLIPIDEMIA TYPE: ICD-10-CM

## 2021-05-25 DIAGNOSIS — R29.898 WEAKNESS OF BOTH LEGS: ICD-10-CM

## 2021-05-25 LAB
ALBUMIN SERPL-MCNC: 4.2 G/DL (ref 3.5–5.2)
ALP BLD-CCNC: 30 U/L (ref 40–129)
ALT SERPL-CCNC: 23 U/L (ref 0–40)
ANION GAP SERPL CALCULATED.3IONS-SCNC: 13 MMOL/L (ref 7–16)
AST SERPL-CCNC: 26 U/L (ref 0–39)
BILIRUB SERPL-MCNC: 0.6 MG/DL (ref 0–1.2)
BUN BLDV-MCNC: 36 MG/DL (ref 6–23)
CALCIUM SERPL-MCNC: 9.2 MG/DL (ref 8.6–10.2)
CHLORIDE BLD-SCNC: 106 MMOL/L (ref 98–107)
CHOLESTEROL, TOTAL: 113 MG/DL (ref 0–199)
CO2: 20 MMOL/L (ref 22–29)
CREAT SERPL-MCNC: 1.4 MG/DL (ref 0.7–1.2)
CREATININE URINE: 70 MG/DL (ref 40–278)
GFR AFRICAN AMERICAN: 59
GFR NON-AFRICAN AMERICAN: 49 ML/MIN/1.73
GLUCOSE BLD-MCNC: 85 MG/DL (ref 74–99)
HBA1C MFR BLD: 7 % (ref 4–5.6)
HCT VFR BLD CALC: 51 % (ref 37–54)
HDLC SERPL-MCNC: 30 MG/DL
HEMOGLOBIN: 15.7 G/DL (ref 12.5–16.5)
LDL CHOLESTEROL CALCULATED: 60 MG/DL (ref 0–99)
MCH RBC QN AUTO: 31.2 PG (ref 26–35)
MCHC RBC AUTO-ENTMCNC: 30.8 % (ref 32–34.5)
MCV RBC AUTO: 101.4 FL (ref 80–99.9)
MICROALBUMIN UR-MCNC: 12.9 MG/L
MICROALBUMIN/CREAT UR-RTO: 18.4 (ref 0–30)
PDW BLD-RTO: 13.4 FL (ref 11.5–15)
PLATELET # BLD: 248 E9/L (ref 130–450)
PMV BLD AUTO: 9.9 FL (ref 7–12)
POTASSIUM SERPL-SCNC: 5 MMOL/L (ref 3.5–5)
RBC # BLD: 5.03 E12/L (ref 3.8–5.8)
SODIUM BLD-SCNC: 139 MMOL/L (ref 132–146)
TOTAL CK: 143 U/L (ref 20–200)
TOTAL PROTEIN: 6.7 G/DL (ref 6.4–8.3)
TRIGL SERPL-MCNC: 114 MG/DL (ref 0–149)
VLDLC SERPL CALC-MCNC: 23 MG/DL
WBC # BLD: 5.2 E9/L (ref 4.5–11.5)

## 2021-05-25 PROCEDURE — 3052F HG A1C>EQUAL 8.0%<EQUAL 9.0%: CPT | Performed by: PHYSICIAN ASSISTANT

## 2021-05-25 PROCEDURE — 4040F PNEUMOC VAC/ADMIN/RCVD: CPT | Performed by: PHYSICIAN ASSISTANT

## 2021-05-25 PROCEDURE — G8420 CALC BMI NORM PARAMETERS: HCPCS | Performed by: PHYSICIAN ASSISTANT

## 2021-05-25 PROCEDURE — 1036F TOBACCO NON-USER: CPT | Performed by: PHYSICIAN ASSISTANT

## 2021-05-25 PROCEDURE — G8427 DOCREV CUR MEDS BY ELIG CLIN: HCPCS | Performed by: PHYSICIAN ASSISTANT

## 2021-05-25 PROCEDURE — 1123F ACP DISCUSS/DSCN MKR DOCD: CPT | Performed by: PHYSICIAN ASSISTANT

## 2021-05-25 PROCEDURE — 99214 OFFICE O/P EST MOD 30 MIN: CPT | Performed by: PHYSICIAN ASSISTANT

## 2021-05-25 RX ORDER — BLOOD-GLUCOSE METER
1 KIT MISCELLANEOUS DAILY
Qty: 50 EACH | Refills: 5 | Status: SHIPPED | OUTPATIENT
Start: 2021-05-25

## 2021-05-25 SDOH — ECONOMIC STABILITY: FOOD INSECURITY: WITHIN THE PAST 12 MONTHS, YOU WORRIED THAT YOUR FOOD WOULD RUN OUT BEFORE YOU GOT MONEY TO BUY MORE.: NEVER TRUE

## 2021-05-25 SDOH — ECONOMIC STABILITY: FOOD INSECURITY: WITHIN THE PAST 12 MONTHS, THE FOOD YOU BOUGHT JUST DIDN'T LAST AND YOU DIDN'T HAVE MONEY TO GET MORE.: NEVER TRUE

## 2021-05-25 ASSESSMENT — SOCIAL DETERMINANTS OF HEALTH (SDOH): HOW HARD IS IT FOR YOU TO PAY FOR THE VERY BASICS LIKE FOOD, HOUSING, MEDICAL CARE, AND HEATING?: NOT HARD AT ALL

## 2021-06-02 ENCOUNTER — OFFICE VISIT (OUTPATIENT)
Dept: CARDIOLOGY CLINIC | Age: 81
End: 2021-06-02
Payer: MEDICARE

## 2021-06-02 VITALS
DIASTOLIC BLOOD PRESSURE: 84 MMHG | BODY MASS INDEX: 24.8 KG/M2 | RESPIRATION RATE: 14 BRPM | HEART RATE: 66 BPM | SYSTOLIC BLOOD PRESSURE: 128 MMHG | HEIGHT: 67 IN | WEIGHT: 158 LBS

## 2021-06-02 DIAGNOSIS — I10 ESSENTIAL HYPERTENSION: Primary | ICD-10-CM

## 2021-06-02 PROCEDURE — 1123F ACP DISCUSS/DSCN MKR DOCD: CPT | Performed by: INTERNAL MEDICINE

## 2021-06-02 PROCEDURE — 4040F PNEUMOC VAC/ADMIN/RCVD: CPT | Performed by: INTERNAL MEDICINE

## 2021-06-02 PROCEDURE — 1036F TOBACCO NON-USER: CPT | Performed by: INTERNAL MEDICINE

## 2021-06-02 PROCEDURE — 93000 ELECTROCARDIOGRAM COMPLETE: CPT | Performed by: INTERNAL MEDICINE

## 2021-06-02 PROCEDURE — G8427 DOCREV CUR MEDS BY ELIG CLIN: HCPCS | Performed by: INTERNAL MEDICINE

## 2021-06-02 PROCEDURE — G8420 CALC BMI NORM PARAMETERS: HCPCS | Performed by: INTERNAL MEDICINE

## 2021-06-02 PROCEDURE — 99214 OFFICE O/P EST MOD 30 MIN: CPT | Performed by: INTERNAL MEDICINE

## 2021-06-02 NOTE — PROGRESS NOTES
OUTPATIENT CARDIOLOGY FOLLOW-UP    Name: Darius Fine    Age: [de-identified] y.o. Primary Care Physician: Isacc Olguin PA-C    Date of Service: 6/2/2021    Chief Complaint:   Chief Complaint   Patient presents with    Coronary Artery Disease       Interim History:   Mr. Candy Fowler is a 20-year-old gentleman with history of type II diabetes, hypertension, hyperlipidemia,  CAD and underwent coronary artery bypass surgery ×5 2002 and is here for follow-up visit. He was seen in the office in 6/17/2020. Since his last visit, he has not had any ER visits or hospitalizations. He is complaint with his medications. He exercises on a regular basis at least 5 times a week. He regularly walks without any dyspnea or chest pain. No new cardiac complaints since last cardiology evaluation. He denies recent chest pain, SOB, palpitations, lightheadedness, dizziness, syncope, PND, or orthopnea. SR on EKG.     Review of Systems:   Cardiac: As per HPI  General: No fever, chills  Pulmonary: As per HPI  HEENT: No visual disturbances, difficult swallowing  GI: No nausea, vomiting  Endocrine: No thyroid disease or DM  Musculoskeletal: VALDES x 4, no focal motor deficits  Skin: Intact, no rashes  Neuro/Psych: No headache or seizures    Past Medical History:  Past Medical History:   Diagnosis Date    Biliary stone     CAD (coronary artery disease) 2002    follows with Dr. Carly Enriquez yearly    Carotid artery stenosis     Dr. Maye Espinoza per patient    Esophageal stricture     when eats has to eat slow    GERD (gastroesophageal reflux disease)     Hyperlipidemia     Hypertension     Pancreatic stent migration     Pancreatic stones     Pancreatitis     several times    Type 2 diabetes mellitus without complication ([facility] [address]        Past Surgical History:  Past Surgical History:   Procedure Laterality Date    CORONARY ARTERY BYPASS GRAFT  2002    DIAGNOSTIC CARDIAC CATH LAB PROCEDURE  2002    ERCP      HERNIA REPAIR  2003    LITHOTRIPSY      OTHER SURGICAL HISTORY      pancreatic stent x2    OR LAP,CHOLECYSTECTOMY N/A 2018    CHOLECYSTECTOMY LAPAROSCOPIC ROBOTIC performed by Mayra Singh MD at [facility] OR       Family History:  Family History   Problem Relation Age of Onset    Heart Disease Mother     High Blood Pressure Mother     Heart Disease Father     High Blood Pressure Father     Cancer Maternal Grandfather        Social History:  Social History     Socioeconomic History    Marital status:      Spouse name: Not on file    Number of children: Not on file    Years of education: Not on file    Highest education level: Not on file   Occupational History    Not on file   Tobacco Use    Smoking status: Former Smoker     Packs/day: 0.00     Years: 0.00     Pack years: 0.00     Types: Cigars     Start date: 1960     Quit date: 1998     Years since quittin.4    Smokeless tobacco: Never Used   Vaping Use    Vaping Use: Never used   Substance and Sexual Activity    Alcohol use: No    Drug use: No    Sexual activity: Never   Other Topics Concern    Not on file   Social History Narrative    Not on file     Social Determinants of Health     Financial Resource Strain: Low Risk     Difficulty of Paying Living Expenses: Not hard at all   Food Insecurity: No Food Insecurity    Worried About [employer] in the Last Year: Never true    [facility] in the Last Year: Never true   Transportation Needs:     Lack of Transportation (Medical):      Lack of Transportation (Non-Medical):    Physical Activity:     Days of Exercise per Week:     Minutes of Exercise per Session:    Stress:     Feeling of Stress :    Social Connections:     Frequency of Communication with Friends and Family:     Frequency of Social Gatherings with Friends and Family:     Attends Taoist Services:     Active Member of Clubs or Organizations:     Attends Club or Organization Meetings:     Marital Status: Intimate Partner Violence:     Fear of Current or Ex-Partner:     Emotionally Abused:     Physically Abused:     Sexually Abused: Allergies: Allergies   Allergen Reactions    Pcn [Penicillins] Swelling    Grass Extracts [Gramineae Pollens]     Percocet [Oxycodone-Acetaminophen] Nausea And Vomiting       Current Medications:  Current Outpatient Medications   Medication Sig Dispense Refill    metFORMIN (GLUCOPHAGE) 1000 MG tablet TAKE 1 TABLET TWICE DAILY WITH MEALS 180 tablet 1    blood glucose test strips (FREESTYLE LITE) strip 1 each by Other route daily As needed. 50 each 5    JARDIANCE 10 MG tablet TAKE 1 TABLET EVERY DAY 90 tablet 1    metoprolol succinate (TOPROL XL) 50 MG extended release tablet TAKE 1 TABLET EVERY DAY 90 tablet 1    losartan (COZAAR) 100 MG tablet TAKE 1 TABLET EVERY MORNING 90 tablet 1    fenofibrate micronized (LOFIBRA) 134 MG capsule TAKE 1 CAPSULE EVERY DAY 90 capsule 1    isosorbide mononitrate (IMDUR) 30 MG extended release tablet TAKE 1 TABLET EVERY DAY 90 tablet 1    glimepiride (AMARYL) 2 MG tablet Take 1 tablet by mouth every morning 90 tablet 1    Multiple Vitamins-Minerals (CENTRUM SILVER 50+MEN PO) Take by mouth      simvastatin (ZOCOR) 40 MG tablet TAKE 1 TABLET EVERY DAY 90 tablet 1    FREESTYLE LANCETS MISC USE DAILY 100 each 5    lipase-protease-amylase (CREON) 08469-99948 units delayed release capsule Take 1 capsule by mouth 3 times daily (with meals) (Patient taking differently: Take 1 capsule by mouth as needed (usual after he goes out to eat) ) 250 capsule 5    aspirin 81 MG EC tablet Take 81 mg by mouth daily        No current facility-administered medications for this visit.        Physical Exam:  /84   Pulse 66   Resp 14   Ht 5' 7\" (1.702 m)   Wt 158 lb (71.7 kg)   BMI 24.75 kg/m²   Wt Readings from Last 3 Encounters:   06/02/21 158 lb (71.7 kg)   05/25/21 158 lb (71.7 kg)   01/29/21 156 lb (70.8 kg)     Appearance: Awake, alert and oriented x 3, no acute respiratory distress  Skin: Intact, no rash  Head: Normocephalic, atraumatic  Eyes: EOMI, no conjunctival erythema  ENMT: No pharyngeal erythema, MMM, no rhinorrhea  Neck: Supple, no elevated JVP, no carotid bruits  Lungs: Clear to auscultation bilaterally. No wheezes, rales, or rhonchi.   Cardiac: Regular rate and rhythm, +S1S2, no murmurs apparent  Abdomen: Soft, nontender, +bowel sounds  Extremities: Moves all extremities x 4, no lower extremity edema  Neurologic: No focal motor deficits apparent, normal mood and affect, alert and oriented x 3  Peripheral Pulses: Intact posterior tibial pulses bilaterally    Laboratory Tests:  Lab Results   Component Value Date    CREATININE 1.4 (H) 05/25/2021    BUN 36 (H) 05/25/2021     05/25/2021    K 5.0 05/25/2021     05/25/2021    CO2 20 (L) 05/25/2021     No results found for: MG  Lab Results   Component Value Date    WBC 5.2 05/25/2021    HGB 15.7 05/25/2021    HCT 51.0 05/25/2021    .4 (H) 05/25/2021     05/25/2021     Lab Results   Component Value Date    ALT 23 05/25/2021    AST 26 05/25/2021    ALKPHOS 30 (L) 05/25/2021    BILITOT 0.6 05/25/2021     Lab Results   Component Value Date    CKTOTAL 143 05/25/2021    TROPONINI <0.01 10/25/2018     No results found for: INR, PROTIME  No results found for: TSHFT4, TSH  Lab Results   Component Value Date    LABA1C 7.0 (H) 05/25/2021     No results found for: EAG  Lab Results   Component Value Date    CHOL 113 05/25/2021    CHOL 125 01/29/2021    CHOL 103 07/30/2020     Lab Results   Component Value Date    TRIG 114 05/25/2021    TRIG 101 01/29/2021    TRIG 87 07/30/2020     Lab Results   Component Value Date    HDL 30 05/25/2021    HDL 36 01/29/2021    HDL 34 07/30/2020     Lab Results   Component Value Date    LDLCALC 60 05/25/2021    LDLCALC 69 01/29/2021    LDLCALC 52 07/30/2020     Lab Results   Component Value Date    LABVLDL 23 05/25/2021    LABVLDL 20 01/29/2021 LABVLDL 17 07/30/2020     No results found for: CHOLHDLRATIO    Cardiac Tests:  ECG: Normal sinus rhythm, normal ECG. No changes were noted. Echocardiogram: 2011-LVEF 65%, anteroseptal walls appear hypokinetic, stage I diastolic dysfunction, mild AI, trace MR and trace TR. Normal PSA systolic pressure. Stress test:  9/16/2011-abnormal myocardial perfusion scan with ischemia in apical and inferior apical walls. There is no evidence of transmural infarction. The ASCVD Risk score (Tyler Romberg., et al., 2013) failed to calculate for the following reasons: The 2013 ASCVD risk score is only valid for ages 36 to 78    Lipid panel on 5/7/2018-total cholesterol 109, HDL 25, LDL 63, triglycerides 107, VLDL 21. ASSESSMENT:  1. CAD, status post CABG ×5 in 2002>>stable  2. Hypertension well controlled  3. Hyperlipidemia on statin well controlled  4. Diastolic dysfunction stage I  5. Type II diabetes      Plan:   1. Continue current medical regimen including aspirin, statin and fenofibrate  2. Continue Toprol-XL, isosorbide and Jardiance  3. Management of diabetes as per his primary care provider. 4. Continue regular exercise  5. Monitor blood pressures at home and cut down the salt intake to less than 2 g.     6. Follow up in one year. The patient's current medication list, allergies, problem list and results of all previously ordered testing were reviewed at today's visit.   Akil Marley MD  [facility]) Cardiology

## 2021-06-02 NOTE — PATIENT INSTRUCTIONS
1. Continue current medical regimen including aspirin, statin and fenofibrate  2. Continue Toprol-XL, isosorbide and Jardiance  3. Management of diabetes as per his primary care provider. 4. Continue regular exercise  5. Monitor blood pressures at home and cut down the salt intake to less than 2 g.     6. Follow up in one year.

## 2021-06-10 RX ORDER — GLIMEPIRIDE 2 MG/1
TABLET ORAL
Qty: 90 TABLET | Refills: 1 | Status: SHIPPED
Start: 2021-06-10 | End: 2021-10-25

## 2021-06-14 PROCEDURE — 9900000038 HC CARDIAC REHAB PHASE 3 - MONTHLY

## 2021-06-18 RX ORDER — SIMVASTATIN 40 MG
40 TABLET ORAL NIGHTLY
Qty: 90 TABLET | Refills: 1 | Status: SHIPPED
Start: 2021-06-18 | End: 2021-06-21 | Stop reason: SDUPTHER

## 2021-06-21 RX ORDER — SIMVASTATIN 40 MG
40 TABLET ORAL NIGHTLY
Qty: 90 TABLET | Refills: 1 | Status: SHIPPED
Start: 2021-06-21 | End: 2022-01-31

## 2021-06-28 ENCOUNTER — HOSPITAL ENCOUNTER (OUTPATIENT)
Dept: CARDIAC REHAB | Age: 81
Discharge: HOME | End: 2021-06-28

## 2021-07-12 PROCEDURE — 9900000038 HC CARDIAC REHAB PHASE 3 - MONTHLY

## 2021-07-15 RX ORDER — ISOSORBIDE MONONITRATE 30 MG/1
TABLET, EXTENDED RELEASE ORAL
Qty: 90 TABLET | Refills: 1 | Status: SHIPPED
Start: 2021-07-15 | End: 2021-12-02

## 2021-07-15 RX ORDER — FENOFIBRATE 134 MG/1
CAPSULE ORAL
Qty: 90 CAPSULE | Refills: 1 | Status: SHIPPED
Start: 2021-07-15 | End: 2021-12-02

## 2021-07-24 DIAGNOSIS — I10 HYPERTENSION, UNSPECIFIED TYPE: ICD-10-CM

## 2021-07-24 DIAGNOSIS — I10 ESSENTIAL HYPERTENSION: ICD-10-CM

## 2021-07-24 DIAGNOSIS — I25.119 CORONARY ARTERY DISEASE INVOLVING NATIVE CORONARY ARTERY OF NATIVE HEART WITH ANGINA PECTORIS (HCC): Chronic | ICD-10-CM

## 2021-07-26 ENCOUNTER — HOSPITAL ENCOUNTER (OUTPATIENT)
Dept: CARDIAC REHAB | Age: 81
Discharge: HOME | End: 2021-07-26

## 2021-07-26 RX ORDER — LOSARTAN POTASSIUM 100 MG/1
TABLET ORAL
Qty: 90 TABLET | Refills: 1 | Status: SHIPPED
Start: 2021-07-26 | End: 2021-12-09

## 2021-07-26 RX ORDER — METOPROLOL SUCCINATE 50 MG/1
TABLET, EXTENDED RELEASE ORAL
Qty: 90 TABLET | Refills: 1 | Status: SHIPPED
Start: 2021-07-26 | End: 2021-12-09

## 2021-08-16 PROCEDURE — 9900000038 HC CARDIAC REHAB PHASE 3 - MONTHLY

## 2021-08-23 ENCOUNTER — OFFICE VISIT (OUTPATIENT)
Dept: PRIMARY CARE CLINIC | Age: 81
End: 2021-08-23
Payer: MEDICARE

## 2021-08-23 VITALS
BODY MASS INDEX: 24.8 KG/M2 | DIASTOLIC BLOOD PRESSURE: 80 MMHG | OXYGEN SATURATION: 95 % | SYSTOLIC BLOOD PRESSURE: 138 MMHG | TEMPERATURE: 97.4 F | WEIGHT: 158 LBS | HEIGHT: 67 IN | RESPIRATION RATE: 14 BRPM | HEART RATE: 64 BPM

## 2021-08-23 DIAGNOSIS — I10 ESSENTIAL HYPERTENSION: ICD-10-CM

## 2021-08-23 DIAGNOSIS — I25.119 CORONARY ARTERY DISEASE INVOLVING NATIVE CORONARY ARTERY OF NATIVE HEART WITH ANGINA PECTORIS (HCC): ICD-10-CM

## 2021-08-23 DIAGNOSIS — E11.9 TYPE 2 DIABETES MELLITUS WITHOUT COMPLICATION, WITHOUT LONG-TERM CURRENT USE OF INSULIN (HCC): Primary | ICD-10-CM

## 2021-08-23 PROBLEM — R78.81 BACTEREMIA: Status: RESOLVED | Noted: 2018-12-22 | Resolved: 2021-08-23

## 2021-08-23 PROBLEM — A41.51 SEPSIS DUE TO ESCHERICHIA COLI (HCC): Status: RESOLVED | Noted: 2019-01-07 | Resolved: 2021-08-23

## 2021-08-23 PROCEDURE — 4040F PNEUMOC VAC/ADMIN/RCVD: CPT | Performed by: PHYSICIAN ASSISTANT

## 2021-08-23 PROCEDURE — G8420 CALC BMI NORM PARAMETERS: HCPCS | Performed by: PHYSICIAN ASSISTANT

## 2021-08-23 PROCEDURE — 99214 OFFICE O/P EST MOD 30 MIN: CPT | Performed by: PHYSICIAN ASSISTANT

## 2021-08-23 PROCEDURE — 3051F HG A1C>EQUAL 7.0%<8.0%: CPT | Performed by: PHYSICIAN ASSISTANT

## 2021-08-23 PROCEDURE — G8427 DOCREV CUR MEDS BY ELIG CLIN: HCPCS | Performed by: PHYSICIAN ASSISTANT

## 2021-08-23 PROCEDURE — 1036F TOBACCO NON-USER: CPT | Performed by: PHYSICIAN ASSISTANT

## 2021-08-23 PROCEDURE — 1123F ACP DISCUSS/DSCN MKR DOCD: CPT | Performed by: PHYSICIAN ASSISTANT

## 2021-08-23 NOTE — PROGRESS NOTES
Marissa Dorantes  [birth year] 8/23/21      HPI:    Patient presents for his 3 month check up on his T2DM. He brings in his SMBGs ranging  in AM's, no hypos. Doing well. Has upcoming eye exam. No neuropathy. Doing well on Metformin, Jardiance, and amaryl 2mg in AM.   Saw Dr. Yee Sahni, FU CAD. Is in cardiac rehab. lipids well controlled. On the toprol, and losarrtan. Also bring in a BP log, ranges 100-140's/60-80's.         Past Medical History:   Diagnosis Date    Biliary stone     CAD (coronary artery disease) 2002    follows with Dr. Yee Sahni yearly    Carotid artery stenosis     Dr. John Rivero per patient    Esophageal stricture     when eats has to eat slow    GERD (gastroesophageal reflux disease)     Hyperlipidemia     Hypertension     Pancreatic stent migration     Pancreatic stones     Pancreatitis     several times    Type 2 diabetes mellitus without complication ([address]         Past Surgical History:   Procedure Laterality Date    CORONARY ARTERY BYPASS GRAFT  2002    DIAGNOSTIC CARDIAC CATH LAB PROCEDURE  2002    ERCP      HERNIA REPAIR  2003    LITHOTRIPSY      OTHER SURGICAL HISTORY      pancreatic stent x2    [place] LAP,CHOLECYSTECTOMY N/A 9/6/2018    CHOLECYSTECTOMY LAPAROSCOPIC ROBOTIC performed by Graham Davey III, MD at [facility] OR       Current Outpatient Medications   Medication Sig Dispense Refill    losartan (COZAAR) 100 MG tablet TAKE 1 TABLET EVERY MORNING 90 tablet 1    metoprolol succinate (TOPROL XL) 50 MG extended release tablet TAKE 1 TABLET EVERY DAY 90 tablet 1    fenofibrate micronized (LOFIBRA) 134 MG capsule TAKE 1 CAPSULE EVERY DAY 90 capsule 1    isosorbide mononitrate (IMDUR) 30 MG extended release tablet TAKE 1 TABLET EVERY DAY 90 tablet 1    simvastatin (ZOCOR) 40 MG tablet Take 1 tablet by mouth nightly 90 tablet 1    glimepiride (AMARYL) 2 MG tablet TAKE 1 TABLET EVERY MORNING 90 tablet 1    metFORMIN (GLUCOPHAGE) 1000 MG tablet TAKE 1 TABLET TWICE DAILY WITH MEALS 180 tablet 1    blood glucose test strips (FREESTYLE LITE) strip 1 each by Other route daily As needed. 50 each 5    JARDIANCE 10 MG tablet TAKE 1 TABLET EVERY DAY 90 tablet 1    Multiple Vitamins-Minerals (CENTRUM SILVER 50+MEN PO) Take by mouth      FREESTYLE LANCETS MISC USE DAILY 100 each 5    lipase-protease-amylase (CREON) 28546-48717 units delayed release capsule Take 1 capsule by mouth 3 times daily (with meals) (Patient taking differently: Take 1 capsule by mouth as needed (usual after he goes out to eat) ) 250 capsule 5    aspirin 81 MG EC tablet Take 81 mg by mouth daily        No current facility-administered medications for this visit. Allergies   Allergen Reactions    Pcn [Penicillins] Swelling    Grass Extracts [Gramineae Pollens]     Percocet [Oxycodone-Acetaminophen] Nausea And Vomiting       Family History   Problem Relation Age of Onset    Heart Disease Mother     High Blood Pressure Mother     Heart Disease Father     High Blood Pressure Father     Cancer Maternal Grandfather        Social History     Socioeconomic History    Marital status:       Spouse name: Not on file    Number of children: Not on file    Years of education: Not on file    Highest education level: Not on file   Occupational History    Not on file   Tobacco Use    Smoking status: Former Smoker     Packs/day: 0.00     Years: 0.00     Pack years: 0.00     Types: Cigars     Start date: 1960     Quit date: 1998     Years since quittin.6    Smokeless tobacco: Never Used   Vaping Use    Vaping Use: Never used   Substance and Sexual Activity    Alcohol use: No    Drug use: No    Sexual activity: Never   Other Topics Concern    Not on file   Social History Narrative    Not on file     Social Determinants of Health     Financial Resource Strain: Low Risk     Difficulty of Paying Living Expenses: Not hard at all   Food Insecurity: No Food Insecurity    Worried About Running Out of Food in the Last Year: Never true    Ran Out of Food in the Last Year: Never true   Transportation Needs:     Lack of Transportation (Medical):  Lack of Transportation (Non-Medical):    Physical Activity:     Days of Exercise per Week:     Minutes of Exercise per Session:    Stress:     Feeling of Stress :    Social Connections:     Frequency of Communication with Friends and Family:     Frequency of Social Gatherings with Friends and Family:     Attends Jewish Services:     Active Member of Clubs or Organizations:     Attends Club or Organization Meetings:     Marital Status:    Intimate Partner Violence:     Fear of Current or Ex-Partner:     Emotionally Abused:     Physically Abused:     Sexually Abused:        Review of Systems :    Constitutional: Negative for fatigue, malaise, fever, chills, appetite change and unexpected weight change. HENT: Negative for congestion, ear discharge, ear pain, facial swelling, mouth sores, postnasal drip, rhinorrhea, sinus pressure, sore throat, tinnitus and trouble swallowing. Eyes: Negative for vision changes, double vision, pain  Respiratory: Negative for cough, chest tightness, shortness of breath, chest heaviness, pleuritic pain,  wheezing. Cardiovascular: Negative for diaphoresis, chest pain, palpitations, or edema   Gastrointestinal: Negative for nausea, vomiting, abdominal pain, diarrhea, constipation, blood in stool, melena, changes in bowel habits, abdominal distention, anal bleeding and rectal pain. Endocrine: Negative for polydipsia, polyphagia and polyuria. No heat or cold intolerance. Genitourinary: Negative for dysuria, urgency, frequency, hematuria, difficulty urinating, nocturia. Musculoskeletal: Negative for myalgias, back pain, joint swelling and arthralgias. No gait disturbance. Skin: Negative for rash, lesions, hair or nail changes.    Allergic/Immunologic: Negative for environmental allergies and food allergies. Neurological: Negative for dizziness, weakness, tremors, syncope, speech difficulty, light-headedness, bowel or bladder control changes, numbness and headaches. Hematological: Negative for adenopathy. Does not bruise/bleed easily. Psychiatric/Behavioral: Negative for suicidal ideas, behavioral problems, sleep disturbance and decreased concentration. The patient is not nervous/anxious. Unless otherwise stated in this report or unable to obtain because of the patient's clinical or mental status as evidenced by the medical record, this patients's positive and negative responses for Review of Systems, constitutional, psych, eyes, ENT, cardiovascular, respiratory, gastrointestinal, neurological, genitourinary, musculoskeletal, integument systems and systems related to the presenting problem are either stated in the preceding or were not pertinent or were negative for the symptoms and/or complaints related to the medical problem. Physical Exam:   Vitals:    08/23/21 1321   BP: 138/80   Pulse: 64   Resp: 14   Temp: 97.4 °F (36.3 °C)   SpO2: 95%   Weight: 158 lb (71.7 kg)   Height: 5' 7\" (1.702 m)     Constitutional:  A and O x 3, in NAD. Afebrile. Appears stated age. Head:  NCAT. Eyes:  PERRLA, EOMI without nystagmus. Conjunctiva normal. Sclera anicteric. Ears:  External ears without lesions. TM's clear bilaterally. Throat:  Pharynx without lesions. Airway patient. Mucous membranes pink and moist without lesions. Neck:  Supple. Nontender, no lymphadenopathy noted, no thyromegaly. Lungs:  Clear to auscultation and breath sounds equal.  Heart:  Regular rate and rhythm, normal S1 and S2, no m/r/g. No murmurs with change in position or Valsalva. PMI non-displaced. UE and LE distal pulses intact. Skin:  No abrasions, ecchymoses, or lacerations unless noted elsewhere. Extremities  No cyanosis, clubbing, or edema noted. Neurological:   Oriented. Motor functions intact.

## 2021-08-26 DIAGNOSIS — E87.5 HYPERKALEMIA: Primary | ICD-10-CM

## 2021-08-30 ENCOUNTER — HOSPITAL ENCOUNTER (OUTPATIENT)
Dept: CARDIAC REHAB | Age: 81
Discharge: HOME | End: 2021-08-30

## 2021-08-31 LAB — DIABETIC RETINOPATHY: NEGATIVE

## 2021-09-10 RX ORDER — LANCETS 28 GAUGE
EACH MISCELLANEOUS
Qty: 100 EACH | Refills: 0 | Status: SHIPPED
Start: 2021-09-10 | End: 2022-03-03

## 2021-09-14 PROCEDURE — 9900000038 HC CARDIAC REHAB PHASE 3 - MONTHLY

## 2021-09-27 ENCOUNTER — HOSPITAL ENCOUNTER (OUTPATIENT)
Dept: CARDIAC REHAB | Age: 81
Discharge: HOME | End: 2021-09-27

## 2021-09-28 ENCOUNTER — IMMUNIZATION (OUTPATIENT)
Dept: PRIMARY CARE CLINIC | Age: 81
End: 2021-09-28
Payer: MEDICARE

## 2021-09-28 PROCEDURE — 0003A COVID-19, PFIZER VACCINE 30MCG/0.3ML DOSE: CPT | Performed by: FAMILY MEDICINE

## 2021-09-28 PROCEDURE — 91300 COVID-19, PFIZER VACCINE 30MCG/0.3ML DOSE: CPT | Performed by: FAMILY MEDICINE

## 2021-10-13 DIAGNOSIS — E11.9 TYPE 2 DIABETES MELLITUS WITHOUT COMPLICATION, WITHOUT LONG-TERM CURRENT USE OF INSULIN (HCC): ICD-10-CM

## 2021-10-19 RX ORDER — EMPAGLIFLOZIN 10 MG/1
TABLET, FILM COATED ORAL
Qty: 90 TABLET | Refills: 1 | Status: SHIPPED
Start: 2021-10-19 | End: 2022-04-11

## 2021-10-25 ENCOUNTER — HOSPITAL ENCOUNTER (OUTPATIENT)
Dept: CARDIAC REHAB | Age: 81
Discharge: HOME | End: 2021-10-25

## 2021-10-25 PROCEDURE — 9900000038 HC CARDIAC REHAB PHASE 3 - MONTHLY

## 2021-10-25 RX ORDER — GLIMEPIRIDE 2 MG/1
TABLET ORAL
Qty: 90 TABLET | Refills: 1 | Status: SHIPPED
Start: 2021-10-25 | End: 2022-04-11

## 2021-11-30 ENCOUNTER — HOSPITAL ENCOUNTER (OUTPATIENT)
Age: 81
Discharge: HOME | End: 2021-12-02

## 2021-11-30 PROCEDURE — 88305 TISSUE EXAM BY PATHOLOGIST: CPT

## 2021-12-02 RX ORDER — ISOSORBIDE MONONITRATE 30 MG/1
TABLET, EXTENDED RELEASE ORAL
Qty: 90 TABLET | Refills: 0 | Status: SHIPPED
Start: 2021-12-02 | End: 2022-03-17

## 2021-12-02 RX ORDER — FENOFIBRATE 134 MG/1
CAPSULE ORAL
Qty: 90 CAPSULE | Refills: 0 | Status: SHIPPED
Start: 2021-12-02 | End: 2022-03-17

## 2021-12-13 PROCEDURE — 9900000038 HC CARDIAC REHAB PHASE 3 - MONTHLY

## 2021-12-27 ENCOUNTER — HOSPITAL ENCOUNTER (OUTPATIENT)
Dept: CARDIAC REHAB | Age: 81
Discharge: HOME | End: 2021-12-27

## 2022-01-11 ENCOUNTER — OFFICE VISIT (OUTPATIENT)
Dept: PRIMARY CARE CLINIC | Age: 82
End: 2022-01-11
Payer: MEDICARE

## 2022-01-11 VITALS
HEIGHT: 67 IN | BODY MASS INDEX: 23.86 KG/M2 | TEMPERATURE: 97.1 F | HEART RATE: 68 BPM | DIASTOLIC BLOOD PRESSURE: 82 MMHG | SYSTOLIC BLOOD PRESSURE: 138 MMHG | RESPIRATION RATE: 16 BRPM | WEIGHT: 152 LBS | OXYGEN SATURATION: 97 %

## 2022-01-11 DIAGNOSIS — E78.5 HYPERLIPIDEMIA, UNSPECIFIED HYPERLIPIDEMIA TYPE: ICD-10-CM

## 2022-01-11 DIAGNOSIS — E11.9 TYPE 2 DIABETES MELLITUS WITHOUT COMPLICATION, WITHOUT LONG-TERM CURRENT USE OF INSULIN (HCC): Primary | ICD-10-CM

## 2022-01-11 DIAGNOSIS — C61 PROSTATE CANCER (HCC): ICD-10-CM

## 2022-01-11 DIAGNOSIS — K85.90 PANCREATITIS, UNSPECIFIED PANCREATITIS TYPE: ICD-10-CM

## 2022-01-11 DIAGNOSIS — I10 PRIMARY HYPERTENSION: ICD-10-CM

## 2022-01-11 DIAGNOSIS — I25.119 CORONARY ARTERY DISEASE INVOLVING NATIVE CORONARY ARTERY OF NATIVE HEART WITH ANGINA PECTORIS (HCC): ICD-10-CM

## 2022-01-11 DIAGNOSIS — Z23 NEED FOR TDAP VACCINATION: ICD-10-CM

## 2022-01-11 LAB — HBA1C MFR BLD: 6.7 %

## 2022-01-11 PROCEDURE — 99214 OFFICE O/P EST MOD 30 MIN: CPT | Performed by: PHYSICIAN ASSISTANT

## 2022-01-11 PROCEDURE — G8420 CALC BMI NORM PARAMETERS: HCPCS | Performed by: PHYSICIAN ASSISTANT

## 2022-01-11 PROCEDURE — G8484 FLU IMMUNIZE NO ADMIN: HCPCS | Performed by: PHYSICIAN ASSISTANT

## 2022-01-11 PROCEDURE — 4040F PNEUMOC VAC/ADMIN/RCVD: CPT | Performed by: PHYSICIAN ASSISTANT

## 2022-01-11 PROCEDURE — 83036 HEMOGLOBIN GLYCOSYLATED A1C: CPT | Performed by: PHYSICIAN ASSISTANT

## 2022-01-11 PROCEDURE — G8427 DOCREV CUR MEDS BY ELIG CLIN: HCPCS | Performed by: PHYSICIAN ASSISTANT

## 2022-01-11 PROCEDURE — 1036F TOBACCO NON-USER: CPT | Performed by: PHYSICIAN ASSISTANT

## 2022-01-11 PROCEDURE — 1123F ACP DISCUSS/DSCN MKR DOCD: CPT | Performed by: PHYSICIAN ASSISTANT

## 2022-01-11 NOTE — PROGRESS NOTES
Isabela Mcconnell  [birth year]  1/11/22      HPI:    Patient presents for his 3 month check up. A1c down to 6.7% today. He notes has really been trying to watch his diet, and exercises routinely. Still going to cardiac rehab. He did recently find out he has prostate cancer, sees Dr. Gregor Olivas upcoming Friday for treatment plan. He is hopeful. .   He bring sin a log of his suagrs, ranges are SMBG's . +yearleye exams. No neuropathy, urine microlb 5/21. BP's ranging 112-149/66-87. Weights 149-154lbs. HR 66-80. He has no acute complaints. Does ask for a Rx for a TDap new great grandby and needs the vaccine. Also got a new cat, 8 months old.      Past Medical History:   Diagnosis Date    Biliary stone     CAD (coronary artery disease) 2002    follows with Dr. Nathalie Smalls yearly    Carotid artery stenosis     Dr. Piter Howe per patient    Esophageal stricture     when eats has to eat slow    GERD (gastroesophageal reflux disease)     Hyperlipidemia     Hypertension     Pancreatic stent migration     Pancreatic stones     Pancreatitis     several times    Type 2 diabetes mellitus without complication ([facility] [address]         Past Surgical History:   Procedure Laterality Date    CORONARY ARTERY BYPASS GRAFT  2002    DIAGNOSTIC CARDIAC CATH LAB PROCEDURE  2002    ERCP      HERNIA REPAIR  2003    LITHOTRIPSY      OTHER SURGICAL HISTORY      pancreatic stent x2    [place] LAP,CHOLECYSTECTOMY N/A 9/6/2018    CHOLECYSTECTOMY LAPAROSCOPIC ROBOTIC performed by [facility] Vera MD at [facility] OR       Current Outpatient Medications   Medication Sig Dispense Refill    Tdap (ADACEL) 5-2-15.5 LF-MCG/0.5 injection Inject 0.5 mLs into the muscle once for 1 dose 0.5 mL 0    losartan (COZAAR) 100 MG tablet TAKE 1 TABLET EVERY MORNING 90 tablet 0    metoprolol succinate (TOPROL XL) 50 MG extended release tablet TAKE 1 TABLET EVERY DAY 90 tablet 0    isosorbide mononitrate (IMDUR) 30 MG extended release tablet TAKE 1 TABLET EVERY DAY 90 tablet 0    fenofibrate micronized (LOFIBRA) 134 MG capsule TAKE 1 CAPSULE EVERY DAY 90 capsule 0    glimepiride (AMARYL) 2 MG tablet TAKE 1 TABLET EVERY MORNING 90 tablet 1    JARDIANCE 10 MG tablet TAKE 1 TABLET EVERY DAY 90 tablet 1    metFORMIN (GLUCOPHAGE) 1000 MG tablet TAKE 1 TABLET TWICE DAILY WITH MEALS 180 tablet 1    FreeStyle Lancets MISC USE DAILY  100 each 0    simvastatin (ZOCOR) 40 MG tablet Take 1 tablet by mouth nightly 90 tablet 1    blood glucose test strips (FREESTYLE LITE) strip 1 each by Other route daily As needed. 50 each 5    Multiple Vitamins-Minerals (CENTRUM SILVER 50+MEN PO) Take by mouth      lipase-protease-amylase (CREON) 78457-82220 units delayed release capsule Take 1 capsule by mouth 3 times daily (with meals) (Patient taking differently: Take 1 capsule by mouth as needed (usual after he goes out to eat) ) 250 capsule 5    aspirin 81 MG EC tablet Take 81 mg by mouth daily        No current facility-administered medications for this visit. Allergies   Allergen Reactions    Pcn [Penicillins] Swelling    Grass Extracts [Gramineae Pollens]     Percocet [Oxycodone-Acetaminophen] Nausea And Vomiting       Family History   Problem Relation Age of Onset    Heart Disease Mother     High Blood Pressure Mother     Heart Disease Father     High Blood Pressure Father     Cancer Maternal Grandfather        Social History     Socioeconomic History    Marital status:       Spouse name: Not on file    Number of children: Not on file    Years of education: Not on file    Highest education level: Not on file   Occupational History    Not on file   Tobacco Use    Smoking status: Former Smoker     Packs/day: 0.00     Years: 0.00     Pack years: 0.00     Types: Cigars     Start date: 1960     Quit date: 1998     Years since quittin.0    Smokeless tobacco: Never Used   Vaping Use    Vaping Use: Never used   Substance and Sexual wheezing. Cardiovascular: Negative for diaphoresis, chest pain, palpitations, or edema   Gastrointestinal: Negative for nausea, vomiting, abdominal pain, diarrhea, constipation, blood in stool, melena, changes in bowel habits, abdominal distention, anal bleeding and rectal pain. Endocrine: Negative for polydipsia, polyphagia and polyuria. No heat or cold intolerance. Musculoskeletal: Negative for myalgias, back pain, joint swelling and arthralgias. No gait disturbance. Skin: Negative for rash, lesions, hair or nail changes. Allergic/Immunologic: Negative for environmental allergies and food allergies. Neurological: Negative for dizziness, weakness, tremors, syncope, speech difficulty, light-headedness, bowel or bladder control changes, numbness and headaches. Hematological: Negative for adenopathy. Does not bruise/bleed easily. Psychiatric/Behavioral: Negative for suicidal ideas, behavioral problems, sleep disturbance and decreased concentration. Unless otherwise stated in this report or unable to obtain because of the patient's clinical or mental status as evidenced by the medical record, this patients's positive and negative responses for Review of Systems, constitutional, psych, eyes, ENT, cardiovascular, respiratory, gastrointestinal, neurological, genitourinary, musculoskeletal, integument systems and systems related to the presenting problem are either stated in the preceding or were not pertinent or were negative for the symptoms and/or complaints related to the medical problem. Physical Exam:   Vitals:    01/11/22 1352   BP: 138/82   Pulse: 68   Resp: 16   Temp: 97.1 °F (36.2 °C)   SpO2: 97%   Weight: 152 lb (68.9 kg)   Height: 5' 7\" (1.702 m)     Constitutional:  A and O x 3, in NAD. Afebrile. Appears stated age. Head:  NCAT. Eyes:  PERRLA, EOMI without nystagmus. Conjunctiva normal. Sclera anicteric. Ears:  External ears without lesions. TM's clear bilaterally.    Throat: Pharynx without lesions. Airway patient. Mucous membranes pink and moist without lesions. Neck:  Supple. Nontender, no lymphadenopathy noted, no thyromegaly. Lungs:  Clear to auscultation and breath sounds equal.  Heart:  Regular rate and rhythm, normal S1 and S2, no m/r/g. Distal pulses intact. Abdomen:  Soft, NTND, NABS x 4, no masses or organomegaly, no rebound or guarding. MS: Normal, painless range of motion. No neurovascular deficit. 5/5 strength in UE's/LE's/ bilaterally. Skin:  No abrasions, ecchymoses, or lacerations unless noted elsewhere. Extremities  No cyanosis, clubbing, or edema noted. Neurological:   Oriented. Motor functions intact. Assessment/Plan:     Narayan Collazo was seen today for 3 month follow-up, diabetes and other. Diagnoses and all orders for this visit:    Type 2 diabetes mellitus without complication, without long-term current use of insulin ([address] well controlled. -     POCT glycosylated hemoglobin (Hb A1C)  -     COMPREHENSIVE METABOLIC PANEL; Future  -     LIPID PANEL; Future    Prostate cancer [facility]), newly diagnosed has FU with urology this Friday . Coronary artery disease involving native coronary artery of native heart with angina pectoris [facility]), sees Dr. Ben Canales. Stable. Labs ordered. -     LIPID PANEL; Future    Primary hypertension, well controlled. -     COMPREHENSIVE METABOLIC PANEL; Future  -     LIPID PANEL; Future    Hyperlipidemia, unspecified hyperlipidemia type, controlled. Needs update labs. -     COMPREHENSIVE METABOLIC PANEL; Future  -     CBC; Future  -     LIPID PANEL; Future    Pancreatitis, unspecified pancreatitis type, no recurrence of stones. Watches diet and alcohol intake. Need for Tdap vaccination  -     Tdap (ADACEL) 5-2-15.5 LF-MCG/0.5 injection; Inject 0.5 mLs into the muscle once for 1 dose        Return in about 6 months (around 7/11/2022).         Counseled regarding above diagnosis, including possible risks and complications,  especially if left uncontrolled. Counseled regarding the possible side effects, risks, benefits and alternatives to treatment; patient and/or guardian verbalizes understanding, agrees, feels comfortable with and wishes to proceed with above treatment plan. Advised patient to call with any new medication issues, and read all Rx info from pharmacy to assure aware of all possible risks and side effects of medication before taking. Reviewed age and gender appropriate health screening exams and vaccinations. Advised patient regarding importance of keeping up with recommended health maintenance and to schedule as soon as possible if overdue, as this is important in assessing for undiagnosed pathology, especially cancer, as well as protecting against potentially harmful/life threatening disease. Patient and/or guardian verbalizes understanding and agrees with above counseling, assessment and plan. All questions answered.     Chapin Orozco PA-C

## 2022-01-12 ENCOUNTER — HOSPITAL ENCOUNTER (OUTPATIENT)
Age: 82
Discharge: HOME | End: 2022-01-12
Payer: MEDICARE

## 2022-01-12 DIAGNOSIS — E11.9 TYPE 2 DIABETES MELLITUS WITHOUT COMPLICATION, WITHOUT LONG-TERM CURRENT USE OF INSULIN (HCC): ICD-10-CM

## 2022-01-12 DIAGNOSIS — I10 PRIMARY HYPERTENSION: ICD-10-CM

## 2022-01-12 DIAGNOSIS — E78.5 HYPERLIPIDEMIA, UNSPECIFIED HYPERLIPIDEMIA TYPE: ICD-10-CM

## 2022-01-12 DIAGNOSIS — I25.119 CORONARY ARTERY DISEASE INVOLVING NATIVE CORONARY ARTERY OF NATIVE HEART WITH ANGINA PECTORIS (HCC): ICD-10-CM

## 2022-01-12 LAB
ALBUMIN SERPL-MCNC: 4.1 G/DL (ref 3.5–5.2)
ALP BLD-CCNC: 37 U/L (ref 40–129)
ALT SERPL-CCNC: 23 U/L (ref 0–40)
ANION GAP SERPL CALCULATED.3IONS-SCNC: 12 MMOL/L (ref 7–16)
AST SERPL-CCNC: 24 U/L (ref 0–39)
BILIRUB SERPL-MCNC: 0.5 MG/DL (ref 0–1.2)
BUN BLDV-MCNC: 43 MG/DL (ref 6–23)
CALCIUM SERPL-MCNC: 9.2 MG/DL (ref 8.6–10.2)
CHLORIDE BLD-SCNC: 109 MMOL/L (ref 98–107)
CHOLESTEROL, TOTAL: 102 MG/DL (ref 0–199)
CO2: 20 MMOL/L (ref 22–29)
CREAT SERPL-MCNC: 1.4 MG/DL (ref 0.7–1.2)
GFR AFRICAN AMERICAN: 59
GFR NON-AFRICAN AMERICAN: 49 ML/MIN/1.73
GLUCOSE BLD-MCNC: 118 MG/DL (ref 74–99)
HCT VFR BLD CALC: 45.6 % (ref 37–54)
HDLC SERPL-MCNC: 32 MG/DL
HEMOGLOBIN: 14.2 G/DL (ref 12.5–16.5)
LDL CHOLESTEROL CALCULATED: 53 MG/DL (ref 0–99)
MCH RBC QN AUTO: 30.6 PG (ref 26–35)
MCHC RBC AUTO-ENTMCNC: 31.1 % (ref 32–34.5)
MCV RBC AUTO: 98.3 FL (ref 80–99.9)
PDW BLD-RTO: 13.6 FL (ref 11.5–15)
PLATELET # BLD: 254 E9/L (ref 130–450)
PMV BLD AUTO: 9.3 FL (ref 7–12)
POTASSIUM SERPL-SCNC: 4.3 MMOL/L (ref 3.5–5)
RBC # BLD: 4.64 E12/L (ref 3.8–5.8)
SODIUM BLD-SCNC: 141 MMOL/L (ref 132–146)
TOTAL PROTEIN: 6.5 G/DL (ref 6.4–8.3)
TRIGL SERPL-MCNC: 87 MG/DL (ref 0–149)
VLDLC SERPL CALC-MCNC: 17 MG/DL
WBC # BLD: 5.6 E9/L (ref 4.5–11.5)

## 2022-01-12 PROCEDURE — 80061 LIPID PANEL: CPT

## 2022-01-12 PROCEDURE — 85027 COMPLETE CBC AUTOMATED: CPT

## 2022-01-12 PROCEDURE — 80053 COMPREHEN METABOLIC PANEL: CPT

## 2022-01-12 PROCEDURE — 36415 COLL VENOUS BLD VENIPUNCTURE: CPT

## 2022-01-31 ENCOUNTER — HOSPITAL ENCOUNTER (OUTPATIENT)
Dept: CARDIAC REHAB | Age: 82
Discharge: HOME | End: 2022-01-31

## 2022-01-31 ENCOUNTER — HOSPITAL ENCOUNTER (OUTPATIENT)
Dept: CT IMAGING | Age: 82
Discharge: HOME | End: 2022-02-02
Payer: MEDICARE

## 2022-01-31 ENCOUNTER — HOSPITAL ENCOUNTER (OUTPATIENT)
Dept: NUCLEAR MEDICINE | Age: 82
Discharge: HOME | End: 2022-01-31
Payer: MEDICARE

## 2022-01-31 DIAGNOSIS — C61 MALIGNANT NEOPLASM OF PROSTATE (HCC): ICD-10-CM

## 2022-01-31 PROCEDURE — A9503 TC99M MEDRONATE: HCPCS | Performed by: RADIOLOGY

## 2022-01-31 PROCEDURE — 74176 CT ABD & PELVIS W/O CONTRAST: CPT

## 2022-01-31 PROCEDURE — 3430000000 HC RX DIAGNOSTIC RADIOPHARMACEUTICAL: Performed by: RADIOLOGY

## 2022-01-31 PROCEDURE — 78306 BONE IMAGING WHOLE BODY: CPT

## 2022-01-31 RX ORDER — TC 99M MEDRONATE 20 MG/10ML
25 INJECTION, POWDER, LYOPHILIZED, FOR SOLUTION INTRAVENOUS
Status: COMPLETED | OUTPATIENT
Start: 2022-01-31 | End: 2022-01-31

## 2022-01-31 RX ORDER — SIMVASTATIN 40 MG
TABLET ORAL
Qty: 90 TABLET | Refills: 1 | Status: SHIPPED
Start: 2022-01-31 | End: 2022-02-01 | Stop reason: SDUPTHER

## 2022-01-31 RX ADMIN — TC 99M MEDRONATE 25 MILLICURIE: 20 INJECTION, POWDER, LYOPHILIZED, FOR SOLUTION INTRAVENOUS at 07:50

## 2022-02-01 RX ORDER — SIMVASTATIN 40 MG
TABLET ORAL
Qty: 90 TABLET | Refills: 1 | Status: SHIPPED | OUTPATIENT
Start: 2022-02-01 | End: 2022-08-29 | Stop reason: SDUPTHER

## 2022-02-03 PROCEDURE — 9900000038 HC CARDIAC REHAB PHASE 3 - MONTHLY

## 2022-02-07 ENCOUNTER — TELEPHONE (OUTPATIENT)
Dept: PRIMARY CARE CLINIC | Age: 82
End: 2022-02-07

## 2022-02-07 ENCOUNTER — TELEPHONE (OUTPATIENT)
Dept: VASCULAR SURGERY | Age: 82
End: 2022-02-07

## 2022-02-07 DIAGNOSIS — I71.40 ABDOMINAL AORTIC ANEURYSM (AAA) WITHOUT RUPTURE: Primary | ICD-10-CM

## 2022-02-07 NOTE — TELEPHONE ENCOUNTER
OK. I reviewed the CT scan, that was ordered by Dr. Jackelyn Rodgers. He doesn't necessarily need to come see me for this for a referral. I made referral to Dr. Eagle Russo a vascular specialist. If patient wants someone different, then I can make referral elsewhere.

## 2022-02-07 NOTE — TELEPHONE ENCOUNTER
----- Message from Francoise Ferreira sent at 2/2/2022  4:10 PM EST -----  Subject: Appointment Request    Reason for Call: Routine (Patient Request) No Script    QUESTIONS  Type of Appointment? Established Patient  Reason for appointment request? No appointments available during search  Additional Information for Provider? Patient found out today that he has   an aneurysm and they are going to hold off doing radiation until something   is done with the aneurysm. He needs a follow up appointment with his PCP   and a referral for vascular surgeon as well. Please advise patient. Thank   you.  ---------------------------------------------------------------------------  --------------  CALL BACK INFO  What is the best way for the office to contact you? OK to leave message on   voicemail  Preferred Call Back Phone Number? [phone]  ---------------------------------------------------------------------------  --------------  SCRIPT ANSWERS  Relationship to Patient? Self  (Is the patient requesting to see the provider for a procedure?)? No  (Is the patient requesting to see the provider urgently  today or   tomorrow. )? No  Have you been diagnosed with, awaiting test results for, or told that you   are suspected of having COVID-19 (Coronavirus)? (If patient has tested   negative or was tested as a requirement for work, school, or travel and   not based on symptoms, answer no)? No  Within the past two weeks have you developed any of the following symptoms   (answer no if symptoms have been present longer than 2 weeks or began   more than 2 weeks ago)? Fever or Chills, Cough, Shortness of breath or   difficulty breathing, Loss of taste or smell, Sore throat, Nasal   congestion, Sneezing or runny nose, Fatigue or generalized body aches   (answer no if pain is specific to a body part e.g. back pain), Diarrhea,   Headache? No  Have you had close contact with someone with COVID-19 in the last 14 days?    No  (Service Expert  click yes below to proceed with [employer] As Usual   Scheduling)?  Yes

## 2022-02-08 ENCOUNTER — OFFICE VISIT (OUTPATIENT)
Dept: VASCULAR SURGERY | Age: 82
End: 2022-02-08
Payer: MEDICARE

## 2022-02-08 ENCOUNTER — TELEPHONE (OUTPATIENT)
Dept: CARDIOLOGY CLINIC | Age: 82
End: 2022-02-08

## 2022-02-08 ENCOUNTER — TELEPHONE (OUTPATIENT)
Dept: ADMINISTRATIVE | Age: 82
End: 2022-02-08

## 2022-02-08 VITALS — SYSTOLIC BLOOD PRESSURE: 112 MMHG | DIASTOLIC BLOOD PRESSURE: 72 MMHG

## 2022-02-08 DIAGNOSIS — I71.40 AAA (ABDOMINAL AORTIC ANEURYSM) WITHOUT RUPTURE: Primary | ICD-10-CM

## 2022-02-08 DIAGNOSIS — I71.40 ABDOMINAL AORTIC ANEURYSM WITHOUT RUPTURE: ICD-10-CM

## 2022-02-08 DIAGNOSIS — I25.119 CORONARY ARTERY DISEASE INVOLVING NATIVE CORONARY ARTERY OF NATIVE HEART WITH ANGINA PECTORIS (HCC): Primary | ICD-10-CM

## 2022-02-08 DIAGNOSIS — Z01.818 PRE-OP EVALUATION: ICD-10-CM

## 2022-02-08 PROCEDURE — 1123F ACP DISCUSS/DSCN MKR DOCD: CPT | Performed by: SURGERY

## 2022-02-08 PROCEDURE — 1036F TOBACCO NON-USER: CPT | Performed by: SURGERY

## 2022-02-08 PROCEDURE — G8484 FLU IMMUNIZE NO ADMIN: HCPCS | Performed by: SURGERY

## 2022-02-08 PROCEDURE — 4040F PNEUMOC VAC/ADMIN/RCVD: CPT | Performed by: SURGERY

## 2022-02-08 PROCEDURE — G8427 DOCREV CUR MEDS BY ELIG CLIN: HCPCS | Performed by: SURGERY

## 2022-02-08 PROCEDURE — G8420 CALC BMI NORM PARAMETERS: HCPCS | Performed by: SURGERY

## 2022-02-08 PROCEDURE — 99204 OFFICE O/P NEW MOD 45 MIN: CPT | Performed by: SURGERY

## 2022-02-08 NOTE — TELEPHONE ENCOUNTER
URGENT referral in the workque on Dr. Fitzgerald's pt per Dr. Hira Shipman dx: cardiac clearance prior to endovascular repair of sacular abdominal aortic aneurysm. Last saw SR in [address] on: 6/2/21. Scheduling advise please.

## 2022-02-08 NOTE — PROGRESS NOTES
Vascular Surgery Outpatient Consultation      Chief Complaint   Patient presents with    Surgical Consult     AAA       Reason for Consult: Abdominal aortic aneurysm    Requesting Physician:  Dr. Linda Jasmine:                The patient is a [de-identified] y.o. male who is referred for evaluation of abdominal aortic aneurysm. The patient is accompanied by his son. The patient has a history of prostate cancer and underwent a CAT scan as part of his work-up. He is being scheduled for radiation therapy. The CAT scan showed an aneurysm that increased in size and he is referred for evaluation. Past Medical History:        Diagnosis Date    AAA (abdominal aortic aneurysm) ([address]     Biliary stone     CAD (coronary artery disease) 2002    follows with Dr. Archana Wang yearly    Carotid artery stenosis     Dr. Leodan Washington per patient    Esophageal stricture     when eats has to eat slow    GERD (gastroesophageal reflux disease)     Hyperlipidemia     Hypertension     Pancreatic stent migration     Pancreatic stones     Pancreatitis     several times    Prostate cancer ([address]     Type 2 diabetes mellitus without complication [address] [address]      Past Surgical History:        Procedure Laterality Date    CORONARY ARTERY BYPASS GRAFT  2002    DIAGNOSTIC CARDIAC CATH LAB PROCEDURE  2002    ERCP      HERNIA REPAIR  2003    LITHOTRIPSY      OTHER SURGICAL HISTORY      pancreatic stent x2    [place] LAP,CHOLECYSTECTOMY N/A 9/6/2018    CHOLECYSTECTOMY LAPAROSCOPIC ROBOTIC performed by An[facility]bal Porter MD at [facility] OR     Current Medications:   Prior to Admission medications    Medication Sig Start Date End Date Taking?  Authorizing Provider   simvastatin (ZOCOR) 40 MG tablet TAKE 1 TABLET EVERY NIGHT 2/1/22  Yes Jai Chery PA-C   losartan (COZAAR) 100 MG tablet TAKE 1 TABLET EVERY MORNING 12/9/21  Yes Rigo Velázquez DO   metoprolol succinate (TOPROL XL) 50 MG extended release tablet TAKE 1 TABLET EVERY DAY 21  Yes Rigo Velázquez DO   isosorbide mononitrate (IMDUR) 30 MG extended release tablet TAKE 1 TABLET EVERY DAY 21  Yes Keara Ingram DO   fenofibrate micronized (LOFIBRA) 134 MG capsule TAKE 1 CAPSULE EVERY DAY 21  Yes Keara Ingram DO   glimepiride (AMARYL) 2 MG tablet TAKE 1 TABLET EVERY MORNING 10/25/21  Yes Soila Henriquez PA-C   JARDIANCE 10 MG tablet TAKE 1 TABLET EVERY DAY 10/19/21  Yes Soila Henriquez PA-C   metFORMIN (GLUCOPHAGE) 1000 MG tablet TAKE 1 TABLET TWICE DAILY WITH MEALS 10/14/21  Yes Soila Henriquez PA-C   FreeStyle Lancets MISC USE DAILY  9/10/21  Yes Soila Henriquez PA-C   blood glucose test strips (FREESTYLE LITE) strip 1 each by Other route daily As needed. 21  Yes Soila Henriquez PA-C   Multiple Vitamins-Minerals (CENTRUM SILVER 50+MEN PO) Take by mouth   Yes Historical Provider, MD   lipase-protease-amylase (CREON) 50888-87012 units delayed release capsule Take 1 capsule by mouth 3 times daily (with meals)  Patient taking differently: Take 1 capsule by mouth as needed (usual after he goes out to eat)  8/10/18  Yes Omar Arenas MD   aspirin 81 MG EC tablet Take 81 mg by mouth daily    Yes Historical Provider, MD     Allergies:  Pcn [penicillins], Grass extracts [gramineae pollens], and Percocet [oxycodone-acetaminophen]    Social History     Socioeconomic History    Marital status:       Spouse name: Not on file    Number of children: Not on file    Years of education: Not on file    Highest education level: Not on file   Occupational History    Not on file   Tobacco Use    Smoking status: Former Smoker     Packs/day: 0.00     Years: 0.00     Pack years: 0.00     Types: Cigars     Start date: 1960     Quit date: 1998     Years since quittin.1    Smokeless tobacco: Never Used   Vaping Use    Vaping Use: Never used   Substance and Sexual Activity    Alcohol use: No    Drug use: No    Sexual activity: Never   Other Topics Concern    Not on file   Social History Narrative    Not on file     Social Determinants of Health     Financial Resource Strain: Low Risk     Difficulty of Paying Living Expenses: Not hard at all   Food Insecurity: No Food Insecurity    Worried About Running Out of Food in the Last Year: Never true    [address] in the Last Year: Never true   Transportation Needs:     Lack of Transportation (Medical): Not on file    Lack of Transportation (Non-Medical):  Not on file   Physical Activity:     Days of Exercise per Week: Not on file    Minutes of Exercise per Session: Not on file   Stress:     Feeling of Stress : Not on file   Social Connections:     Frequency of Communication with Friends and Family: Not on file    Frequency of Social Gatherings with Friends and Family: Not on file    Attends Rastafari Services: Not on file    Active Member of [address] [place] [address] [employer] or Organizations: Not on file    Attends Club or Organization Meetings: Not on file    Marital Status: Not on file   Intimate Partner Violence:     Fear of Current or Ex-Partner: Not on file    Emotionally Abused: Not on file    Physically Abused: Not on file    Sexually Abused: Not on file   Housing Stability:     Unable to Pay for Housing in the Last Year: Not on file    Number of [place] in the Last Year: Not on file    Unstable Housing in the Last Year: Not on file        Family History   Problem Relation Age of Onset    Heart Disease Mother     High Blood Pressure Mother     Heart Disease Father     High Blood Pressure Father     Cancer Maternal Grandfather        REVIEW OF SYSTEMS (New symptoms):    Eyes:      Blurred vision:  No [x]/Yes []               Diplopia:   No [x]/Yes []               Vision loss:       No [x]/Yes []   Ears, nose, throat:             Hearing loss:    No [x]/Yes []      Vertigo:   No [x]/Yes []                       Swallowing problem:  No [x]/Yes []               Nose carotid bruits  Abdomen: soft, non-tender, non-distended, normal bowel sounds, no masses or organomegaly  Musculoskeletal: normal range of motion, no joint swelling, deformity or tenderness  Neurologic: no cranial nerve deficit, gait, coordination and speech normal  Extremities: no leg edema bilaterally    PULSE EXAM      Right      Left   Brachial     Radial     Femoral     Popliteal     Dorsalis Pedis     Posterior Tibial 2 2   (3=normal, 2=diminished, 1=barely palpable, 4=widened)      Problem List Items Addressed This Visit     AAA (abdominal aortic aneurysm) without rupture (HCC) - Primary            I reviewed the natural history and treatment options of aortic aneurysms with the patient and his son. There is not adequate neck below the renal arteries before the start of the dilation for a standard graft. However, I do not feel he is a candidate for an open repair due to his comorbidities and significant risk for cardiac and pulmonary complications. There is some preserved aorta just distal to the renal arteries and my impression is that a endovascular graft will seal.  I reviewed with them that this does not strictly follow the guidelines but I feel is acceptable risk compared to an open repair or a fenestrated graft. I feel a fenestrated graft would put him at significant risk for dialysis dependent renal failure. They understand and agree to plans for endovascular repair. We will asked Dr. Juan Aldridge if he is acceptable risk for endovascular repair from a cardiac standpoint. No follow-ups on file.

## 2022-02-08 NOTE — TELEPHONE ENCOUNTER
Patient needs cardiac clearance for endovascular repair of aneurysm ASAP by Dr. Yuki Dolan. Left message for patient to call the office to assess for any cardiac symptoms since his last visit in June 2021. Patient on ASA.

## 2022-02-08 NOTE — TELEPHONE ENCOUNTER
Patient denies any chest pain, shortness of breath or palpitations since last visit in June 2021. Patient is able to complete all activities of daily living, including; climbing one flight of stairs and walking one block without cardiac symptoms. Patient on ASA. Please advise.

## 2022-02-09 ENCOUNTER — TELEPHONE (OUTPATIENT)
Dept: VASCULAR SURGERY | Age: 82
End: 2022-02-09

## 2022-02-09 NOTE — TELEPHONE ENCOUNTER
Patient's revised cardiac risk index is low (1), class II risk, 0.9% risk of major perioperative cardiac events. Patient does not have any active cardiac symptoms including chest pain, decompensated heart failure, uncontrolled arrhythmias, or obstructive valve lesions. He does have good functional capacity and was able to walk 1 block and climb 1 flight of stairs without any chest pain or dyspnea. No further cardiac work-up is needed prior to EVAR.   Continue aspirin and metoprolol perioperatively

## 2022-02-09 NOTE — PROGRESS NOTES
[address] PRE-ADMISSION TESTING GENERAL INSTRUCTIONS- [facility]-phone number:[phone]    GENERAL INSTRUCTIONS  [x] Antibacterial Soap shower Night before and/or AM of Surgery  [x] Nothing by mouth after midnight, including gum, candy, mints, or water. [x] You may brush your teeth, gargle, but do NOT swallow water. .   []No smoking, chewing tobacco, illegal drugs, marijuana or alcohol within 24 hours of your surgery. [x] Jewelry, valuables or body piercing's should not be brought to the hospital.   [x] Do not wear lotions, powders, deodorant. [x] Bring insurance card and photo ID. [x] Transfusion Bracelet: Please bring with you to hospital, day of surgery     PARKING INSTRUCTIONS:   [x] Arrival Time: Monday 2/14 at 9 AM,, you and your  will need to wear a mask. Walk up the front walk to the [facility], the door will be locked an employee will greet you and let you in.    · [x] Parking lot '\"I\"  is located on [facility] (the corner of [facility] and [facility]). To enter, press the button and the gate will lift. A free token will be provided to exit the lot. EDUCATION INSTRUCTIONS:      [x] Pre-admission Testing educational folder given  [] Incentive Spirometry,coughing & deep breathing exercises reviewed. [x]Medication information sheet(s)   [x]Fluoroscopy-Xray used in surgery reviewed with patient. Educational pamphlet placed in chart. [x]Pain: Post-op pain is normal and to be expected. You will be asked to rate your pain from 0-10 (a zero is not acceptable-education is needed). Your post-op pain goal is: 4  [x] Ask your nurse for your pain medication. MEDICATION INSTRUCTIONS:  [x]Bring a complete list of your medications, please write the last time you took the medicine, give this list to the nurse.   [x] Take the following medications the morning of surgery with 1-2 ounces of water: ISOSORBIDE  [x] Stop herbal supplements, ibuprofen (Nsaids)  and vitamins 5 days before your surgery. [x] DO NOT take any diabetic medicine the morning of surgery. [x] If you are diabetic and your blood sugar is low or you feel symptomatic, you may drink 1-2 ounces of apple juice or take a glucose tablet. The morning of your procedure, you may call the pre-op area if you have concerns about your blood sugar [phone]. [x] Follow physician instructions regarding any blood thinners you may be taking. HOLD ASPIRIN DAY OF SURGERY. WHAT TO EXPECT:  [x] The day of surgery you will be greeted and checked in by the [employer]. Please bring your photo ID and insurance card. A nurse will greet you in accordance to the time you are needed in the pre-op area to prepare you for surgery. Please do not be discouraged if you are not greeted in the order you arrive as there are many variables that are involved in patient preparation. Your patience is greatly appreciated as you wait for your nurse. Please bring in items such as: books, magazines, newspapers, electronics, or any other items  to occupy your time in the waiting area. [x]  Delays may occur with surgery and staff will make a sincere effort to keep you informed of delays. If any delays occur with your procedure, we apologize ahead of time for your inconvenience as we recognize the value of your time.

## 2022-02-09 NOTE — TELEPHONE ENCOUNTER
Patient notified of Dr. Fitzgerald's risk assessment/recommendation for surgery. Nereida in Dr. Deana Romano office notified.

## 2022-02-10 ENCOUNTER — HOSPITAL ENCOUNTER (OUTPATIENT)
Dept: GENERAL RADIOLOGY | Age: 82
Discharge: HOME | End: 2022-02-12
Payer: MEDICARE

## 2022-02-10 ENCOUNTER — HOSPITAL ENCOUNTER (OUTPATIENT)
Dept: PREADMISSION TESTING | Age: 82
Discharge: HOME | End: 2022-02-10
Payer: MEDICARE

## 2022-02-10 VITALS
BODY MASS INDEX: 23.02 KG/M2 | SYSTOLIC BLOOD PRESSURE: 121 MMHG | TEMPERATURE: 97 F | WEIGHT: 147 LBS | RESPIRATION RATE: 18 BRPM | DIASTOLIC BLOOD PRESSURE: 70 MMHG | OXYGEN SATURATION: 96 % | HEART RATE: 58 BPM

## 2022-02-10 DIAGNOSIS — Z01.810 PREOP CARDIOVASCULAR EXAM: ICD-10-CM

## 2022-02-10 DIAGNOSIS — Z01.812 PRE-OPERATIVE LABORATORY EXAMINATION: ICD-10-CM

## 2022-02-10 DIAGNOSIS — I71.40 AAA (ABDOMINAL AORTIC ANEURYSM) WITHOUT RUPTURE: ICD-10-CM

## 2022-02-10 LAB
ABO/RH: NORMAL
ANION GAP SERPL CALCULATED.3IONS-SCNC: 12 MMOL/L (ref 7–16)
ANTIBODY SCREEN: NORMAL
BUN BLDV-MCNC: 40 MG/DL (ref 6–23)
CALCIUM SERPL-MCNC: 8.7 MG/DL (ref 8.6–10.2)
CHLORIDE BLD-SCNC: 108 MMOL/L (ref 98–107)
CO2: 18 MMOL/L (ref 22–29)
CREAT SERPL-MCNC: 1.4 MG/DL (ref 0.7–1.2)
EKG ATRIAL RATE: 66 BPM
EKG P AXIS: 48 DEGREES
EKG P-R INTERVAL: 198 MS
EKG Q-T INTERVAL: 408 MS
EKG QRS DURATION: 82 MS
EKG QTC CALCULATION (BAZETT): 427 MS
EKG R AXIS: 43 DEGREES
EKG T AXIS: 24 DEGREES
EKG VENTRICULAR RATE: 66 BPM
GFR AFRICAN AMERICAN: 59
GFR NON-AFRICAN AMERICAN: 49 ML/MIN/1.73
GLUCOSE BLD-MCNC: 149 MG/DL (ref 74–99)
HCT VFR BLD CALC: 44.4 % (ref 37–54)
HEMOGLOBIN: 14.4 G/DL (ref 12.5–16.5)
INR BLD: 1
MCH RBC QN AUTO: 31.3 PG (ref 26–35)
MCHC RBC AUTO-ENTMCNC: 32.4 % (ref 32–34.5)
MCV RBC AUTO: 96.5 FL (ref 80–99.9)
PDW BLD-RTO: 14.6 FL (ref 11.5–15)
PLATELET # BLD: 241 E9/L (ref 130–450)
PMV BLD AUTO: 9.5 FL (ref 7–12)
POTASSIUM REFLEX MAGNESIUM: 4.4 MMOL/L (ref 3.5–5)
PROTHROMBIN TIME: 11.3 SEC (ref 9.3–12.4)
RBC # BLD: 4.6 E12/L (ref 3.8–5.8)
SODIUM BLD-SCNC: 138 MMOL/L (ref 132–146)
WBC # BLD: 7.6 E9/L (ref 4.5–11.5)

## 2022-02-10 PROCEDURE — 86850 RBC ANTIBODY SCREEN: CPT

## 2022-02-10 PROCEDURE — 85610 PROTHROMBIN TIME: CPT

## 2022-02-10 PROCEDURE — 86901 BLOOD TYPING SEROLOGIC RH(D): CPT

## 2022-02-10 PROCEDURE — 87081 CULTURE SCREEN ONLY: CPT

## 2022-02-10 PROCEDURE — 93005 ELECTROCARDIOGRAM TRACING: CPT | Performed by: NURSE PRACTITIONER

## 2022-02-10 PROCEDURE — 36415 COLL VENOUS BLD VENIPUNCTURE: CPT

## 2022-02-10 PROCEDURE — 86923 COMPATIBILITY TEST ELECTRIC: CPT

## 2022-02-10 PROCEDURE — 80048 BASIC METABOLIC PNL TOTAL CA: CPT

## 2022-02-10 PROCEDURE — 71046 X-RAY EXAM CHEST 2 VIEWS: CPT

## 2022-02-10 PROCEDURE — 85027 COMPLETE CBC AUTOMATED: CPT

## 2022-02-10 PROCEDURE — 86900 BLOOD TYPING SEROLOGIC ABO: CPT

## 2022-02-11 LAB — MRSA CULTURE ONLY: NORMAL

## 2022-02-12 ENCOUNTER — ANESTHESIA EVENT (OUTPATIENT)
Dept: OPERATING ROOM | Age: 82
DRG: 269 | End: 2022-02-12
Payer: MEDICARE

## 2022-02-14 ENCOUNTER — ANESTHESIA (OUTPATIENT)
Dept: OPERATING ROOM | Age: 82
DRG: 269 | End: 2022-02-14
Payer: MEDICARE

## 2022-02-14 ENCOUNTER — HOSPITAL ENCOUNTER (INPATIENT)
Age: 82
LOS: 2 days | Discharge: HOME | DRG: 269 | End: 2022-02-16
Attending: SURGERY | Admitting: SURGERY
Payer: MEDICARE

## 2022-02-14 VITALS — TEMPERATURE: 97.5 F | SYSTOLIC BLOOD PRESSURE: 102 MMHG | DIASTOLIC BLOOD PRESSURE: 79 MMHG | OXYGEN SATURATION: 99 %

## 2022-02-14 DIAGNOSIS — Z01.810 PREOP CARDIOVASCULAR EXAM: Primary | ICD-10-CM

## 2022-02-14 DIAGNOSIS — Z01.812 PRE-OPERATIVE LABORATORY EXAMINATION: ICD-10-CM

## 2022-02-14 DIAGNOSIS — I71.40 AAA (ABDOMINAL AORTIC ANEURYSM) WITHOUT RUPTURE: ICD-10-CM

## 2022-02-14 DIAGNOSIS — G89.18 ACUTE POSTOPERATIVE PAIN: ICD-10-CM

## 2022-02-14 LAB
CHP ED QC CHECK: NORMAL
GLUCOSE BLD-MCNC: 116 MG/DL
METER GLUCOSE: 116 MG/DL (ref 74–99)
METER GLUCOSE: 164 MG/DL (ref 74–99)
METER GLUCOSE: 195 MG/DL (ref 74–99)

## 2022-02-14 PROCEDURE — 37799 UNLISTED PX VASCULAR SURGERY: CPT

## 2022-02-14 PROCEDURE — 82962 GLUCOSE BLOOD TEST: CPT

## 2022-02-14 PROCEDURE — 34812 OPN FEM ART EXPOS: CPT | Performed by: SURGERY

## 2022-02-14 PROCEDURE — 3700000001 HC ADD 15 MINUTES (ANESTHESIA): Performed by: SURGERY

## 2022-02-14 PROCEDURE — 2580000003 HC RX 258: Performed by: SURGERY

## 2022-02-14 PROCEDURE — 6360000002 HC RX W HCPCS

## 2022-02-14 PROCEDURE — 7100000000 HC PACU RECOVERY - FIRST 15 MIN: Performed by: SURGERY

## 2022-02-14 PROCEDURE — C1760 CLOSURE DEV, VASC: HCPCS | Performed by: SURGERY

## 2022-02-14 PROCEDURE — 6370000000 HC RX 637 (ALT 250 FOR IP): Performed by: NURSE PRACTITIONER

## 2022-02-14 PROCEDURE — 3600000007 HC SURGERY HYBRID BASE: Performed by: SURGERY

## 2022-02-14 PROCEDURE — C1894 INTRO/SHEATH, NON-LASER: HCPCS | Performed by: SURGERY

## 2022-02-14 PROCEDURE — 2000000000 HC ICU R&B

## 2022-02-14 PROCEDURE — 2700000000 HC OXYGEN THERAPY PER DAY

## 2022-02-14 PROCEDURE — 3700000000 HC ANESTHESIA ATTENDED CARE: Performed by: SURGERY

## 2022-02-14 PROCEDURE — C1769 GUIDE WIRE: HCPCS | Performed by: SURGERY

## 2022-02-14 PROCEDURE — 34808 ENDOVAS ILIAC A DEVICE ADDON: CPT | Performed by: SURGERY

## 2022-02-14 PROCEDURE — C1773 RET DEV, INSERTABLE: HCPCS | Performed by: SURGERY

## 2022-02-14 PROCEDURE — 6360000004 HC RX CONTRAST MEDICATION: Performed by: SURGERY

## 2022-02-14 PROCEDURE — 041L0JH BYPASS LEFT FEMORAL ARTERY TO RIGHT FEMORAL ARTERY WITH SYNTHETIC SUBSTITUTE, OPEN APPROACH: ICD-10-PCS | Performed by: SURGERY

## 2022-02-14 PROCEDURE — 2580000003 HC RX 258

## 2022-02-14 PROCEDURE — 2580000003 HC RX 258: Performed by: NURSE PRACTITIONER

## 2022-02-14 PROCEDURE — 7100000000 HC PACU RECOVERY - FIRST 15 MIN

## 2022-02-14 PROCEDURE — 2780000010 HC IMPLANT OTHER: Performed by: SURGERY

## 2022-02-14 PROCEDURE — C1725 CATH, TRANSLUMIN NON-LASER: HCPCS | Performed by: SURGERY

## 2022-02-14 PROCEDURE — 7100000001 HC PACU RECOVERY - ADDTL 15 MIN: Performed by: SURGERY

## 2022-02-14 PROCEDURE — 04V03DZ RESTRICTION OF ABDOMINAL AORTA WITH INTRALUMINAL DEVICE, PERCUTANEOUS APPROACH: ICD-10-PCS | Performed by: SURGERY

## 2022-02-14 PROCEDURE — A4217 STERILE WATER/SALINE, 500 ML: HCPCS | Performed by: SURGERY

## 2022-02-14 PROCEDURE — C1887 CATHETER, GUIDING: HCPCS | Performed by: SURGERY

## 2022-02-14 PROCEDURE — C1768 GRAFT, VASCULAR: HCPCS | Performed by: SURGERY

## 2022-02-14 PROCEDURE — C2628 CATHETER, OCCLUSION: HCPCS | Performed by: SURGERY

## 2022-02-14 PROCEDURE — 82803 BLOOD GASES ANY COMBINATION: CPT

## 2022-02-14 PROCEDURE — C1889 IMPLANT/INSERT DEVICE, NOC: HCPCS | Performed by: SURGERY

## 2022-02-14 PROCEDURE — C1713 ANCHOR/SCREW BN/BN,TIS/BN: HCPCS | Performed by: SURGERY

## 2022-02-14 PROCEDURE — 34813 FEMORAL ENDOVAS GRAFT ADD-ON: CPT | Performed by: SURGERY

## 2022-02-14 PROCEDURE — 34703 EVASC RPR A-UNILAC NDGFT: CPT | Performed by: SURGERY

## 2022-02-14 PROCEDURE — 2709999900 HC NON-CHARGEABLE SUPPLY: Performed by: SURGERY

## 2022-02-14 PROCEDURE — 7100000001 HC PACU RECOVERY - ADDTL 15 MIN

## 2022-02-14 PROCEDURE — 85347 COAGULATION TIME ACTIVATED: CPT

## 2022-02-14 PROCEDURE — 2500000003 HC RX 250 WO HCPCS

## 2022-02-14 PROCEDURE — 3600000017 HC SURGERY HYBRID ADDL 15MIN: Performed by: SURGERY

## 2022-02-14 PROCEDURE — 6360000002 HC RX W HCPCS: Performed by: SURGERY

## 2022-02-14 DEVICE — NESTER EMBOLIZATION MICROCOIL
Type: IMPLANTABLE DEVICE | Site: AORTA | Status: FUNCTIONAL
Brand: NESTER

## 2022-02-14 DEVICE — NESTER EMBOLIZATION COIL
Type: IMPLANTABLE DEVICE | Site: AORTA | Status: FUNCTIONAL
Brand: NESTER

## 2022-02-14 DEVICE — EXCLUDER CONFORM AAA TRNK ENDO 26MMX12MMX12CM 16FR
Type: IMPLANTABLE DEVICE | Site: AORTA | Status: FUNCTIONAL
Brand: GORE EXCLUDER CONF AAA ENDO - TRUNK-IPSILATERAL

## 2022-02-14 DEVICE — PROPATEN VASCULAR GRAFT TW RR 8MMX50CM 40CM RINGS HEPARIN
Type: IMPLANTABLE DEVICE | Site: ARTERIAL | Status: FUNCTIONAL
Brand: GORE PROPATEN VASCULAR GRAFT

## 2022-02-14 RX ORDER — EPHEDRINE SULFATE/0.9% NACL/PF 50 MG/5 ML
SYRINGE (ML) INTRAVENOUS PRN
Status: DISCONTINUED | OUTPATIENT
Start: 2022-02-14 | End: 2022-02-14 | Stop reason: SDUPTHER

## 2022-02-14 RX ORDER — LABETALOL HYDROCHLORIDE 5 MG/ML
INJECTION, SOLUTION INTRAVENOUS PRN
Status: DISCONTINUED | OUTPATIENT
Start: 2022-02-14 | End: 2022-02-14 | Stop reason: SDUPTHER

## 2022-02-14 RX ORDER — NICOTINE POLACRILEX 4 MG
15 LOZENGE BUCCAL PRN
Status: DISCONTINUED | OUTPATIENT
Start: 2022-02-14 | End: 2022-02-16 | Stop reason: HOSPADM

## 2022-02-14 RX ORDER — FENOFIBRATE 160 MG/1
160 TABLET ORAL DAILY
Status: DISCONTINUED | OUTPATIENT
Start: 2022-02-15 | End: 2022-02-16 | Stop reason: HOSPADM

## 2022-02-14 RX ORDER — METOPROLOL SUCCINATE 50 MG/1
50 TABLET, EXTENDED RELEASE ORAL NIGHTLY
Status: DISCONTINUED | OUTPATIENT
Start: 2022-02-15 | End: 2022-02-16 | Stop reason: HOSPADM

## 2022-02-14 RX ORDER — SODIUM CHLORIDE 9 MG/ML
INJECTION, SOLUTION INTRAVENOUS CONTINUOUS PRN
Status: DISCONTINUED | OUTPATIENT
Start: 2022-02-14 | End: 2022-02-14 | Stop reason: SDUPTHER

## 2022-02-14 RX ORDER — PROMETHAZINE HYDROCHLORIDE 25 MG/ML
6.25 INJECTION, SOLUTION INTRAMUSCULAR; INTRAVENOUS
Status: DISCONTINUED | OUTPATIENT
Start: 2022-02-14 | End: 2022-02-14 | Stop reason: HOSPADM

## 2022-02-14 RX ORDER — PROTAMINE SULFATE 10 MG/ML
INJECTION, SOLUTION INTRAVENOUS PRN
Status: DISCONTINUED | OUTPATIENT
Start: 2022-02-14 | End: 2022-02-14 | Stop reason: SDUPTHER

## 2022-02-14 RX ORDER — SODIUM CHLORIDE 9 MG/ML
INJECTION, SOLUTION INTRAVENOUS CONTINUOUS
Status: DISCONTINUED | OUTPATIENT
Start: 2022-02-14 | End: 2022-02-15

## 2022-02-14 RX ORDER — LIDOCAINE HYDROCHLORIDE 20 MG/ML
INJECTION, SOLUTION INTRAVENOUS PRN
Status: DISCONTINUED | OUTPATIENT
Start: 2022-02-14 | End: 2022-02-14 | Stop reason: SDUPTHER

## 2022-02-14 RX ORDER — PHENYLEPHRINE HCL IN 0.9% NACL 1 MG/10 ML
SYRINGE (ML) INTRAVENOUS PRN
Status: DISCONTINUED | OUTPATIENT
Start: 2022-02-14 | End: 2022-02-14 | Stop reason: SDUPTHER

## 2022-02-14 RX ORDER — DEXTROSE MONOHYDRATE 25 G/50ML
12.5 INJECTION, SOLUTION INTRAVENOUS PRN
Status: DISCONTINUED | OUTPATIENT
Start: 2022-02-14 | End: 2022-02-16 | Stop reason: HOSPADM

## 2022-02-14 RX ORDER — ISOSORBIDE MONONITRATE 30 MG/1
30 TABLET, EXTENDED RELEASE ORAL DAILY
Status: DISCONTINUED | OUTPATIENT
Start: 2022-02-15 | End: 2022-02-16 | Stop reason: HOSPADM

## 2022-02-14 RX ORDER — FENTANYL CITRATE 50 UG/ML
INJECTION, SOLUTION INTRAMUSCULAR; INTRAVENOUS PRN
Status: DISCONTINUED | OUTPATIENT
Start: 2022-02-14 | End: 2022-02-14 | Stop reason: SDUPTHER

## 2022-02-14 RX ORDER — NEOSTIGMINE METHYLSULFATE 1 MG/ML
INJECTION, SOLUTION INTRAVENOUS PRN
Status: DISCONTINUED | OUTPATIENT
Start: 2022-02-14 | End: 2022-02-14 | Stop reason: SDUPTHER

## 2022-02-14 RX ORDER — MORPHINE SULFATE 2 MG/ML
1 INJECTION, SOLUTION INTRAMUSCULAR; INTRAVENOUS
Status: DISCONTINUED | OUTPATIENT
Start: 2022-02-14 | End: 2022-02-16 | Stop reason: HOSPADM

## 2022-02-14 RX ORDER — VANCOMYCIN HYDROCHLORIDE 1 G/20ML
INJECTION, POWDER, LYOPHILIZED, FOR SOLUTION INTRAVENOUS PRN
Status: DISCONTINUED | OUTPATIENT
Start: 2022-02-14 | End: 2022-02-14 | Stop reason: SDUPTHER

## 2022-02-14 RX ORDER — HEPARIN SODIUM 1000 [USP'U]/ML
INJECTION, SOLUTION INTRAVENOUS; SUBCUTANEOUS PRN
Status: DISCONTINUED | OUTPATIENT
Start: 2022-02-14 | End: 2022-02-14 | Stop reason: SDUPTHER

## 2022-02-14 RX ORDER — SODIUM CHLORIDE 9 MG/ML
25 INJECTION, SOLUTION INTRAVENOUS PRN
Status: DISCONTINUED | OUTPATIENT
Start: 2022-02-14 | End: 2022-02-14 | Stop reason: HOSPADM

## 2022-02-14 RX ORDER — ATORVASTATIN CALCIUM 20 MG/1
20 TABLET, FILM COATED ORAL NIGHTLY
Status: DISCONTINUED | OUTPATIENT
Start: 2022-02-15 | End: 2022-02-16 | Stop reason: HOSPADM

## 2022-02-14 RX ORDER — ONDANSETRON 2 MG/ML
4 INJECTION INTRAMUSCULAR; INTRAVENOUS
Status: DISCONTINUED | OUTPATIENT
Start: 2022-02-14 | End: 2022-02-14 | Stop reason: HOSPADM

## 2022-02-14 RX ORDER — OXYCODONE HYDROCHLORIDE 5 MG/1
5 TABLET ORAL
Status: DISCONTINUED | OUTPATIENT
Start: 2022-02-14 | End: 2022-02-16 | Stop reason: HOSPADM

## 2022-02-14 RX ORDER — LOSARTAN POTASSIUM 50 MG/1
100 TABLET ORAL DAILY
Status: DISCONTINUED | OUTPATIENT
Start: 2022-02-15 | End: 2022-02-16 | Stop reason: HOSPADM

## 2022-02-14 RX ORDER — SODIUM CHLORIDE 9 MG/ML
25 INJECTION, SOLUTION INTRAVENOUS PRN
Status: DISCONTINUED | OUTPATIENT
Start: 2022-02-14 | End: 2022-02-16 | Stop reason: HOSPADM

## 2022-02-14 RX ORDER — ROCURONIUM BROMIDE 10 MG/ML
INJECTION, SOLUTION INTRAVENOUS PRN
Status: DISCONTINUED | OUTPATIENT
Start: 2022-02-14 | End: 2022-02-14 | Stop reason: SDUPTHER

## 2022-02-14 RX ORDER — GLYCOPYRROLATE 1 MG/5 ML
SYRINGE (ML) INTRAVENOUS PRN
Status: DISCONTINUED | OUTPATIENT
Start: 2022-02-14 | End: 2022-02-14 | Stop reason: SDUPTHER

## 2022-02-14 RX ORDER — ASPIRIN 81 MG/1
81 TABLET ORAL DAILY
Status: DISCONTINUED | OUTPATIENT
Start: 2022-02-15 | End: 2022-02-16 | Stop reason: HOSPADM

## 2022-02-14 RX ORDER — ONDANSETRON 2 MG/ML
INJECTION INTRAMUSCULAR; INTRAVENOUS PRN
Status: DISCONTINUED | OUTPATIENT
Start: 2022-02-14 | End: 2022-02-14 | Stop reason: SDUPTHER

## 2022-02-14 RX ORDER — PROPOFOL 10 MG/ML
INJECTION, EMULSION INTRAVENOUS PRN
Status: DISCONTINUED | OUTPATIENT
Start: 2022-02-14 | End: 2022-02-14 | Stop reason: SDUPTHER

## 2022-02-14 RX ORDER — SODIUM CHLORIDE 0.9 % (FLUSH) 0.9 %
10 SYRINGE (ML) INJECTION PRN
Status: DISCONTINUED | OUTPATIENT
Start: 2022-02-14 | End: 2022-02-14 | Stop reason: HOSPADM

## 2022-02-14 RX ORDER — MIDAZOLAM HYDROCHLORIDE 1 MG/ML
INJECTION INTRAMUSCULAR; INTRAVENOUS PRN
Status: DISCONTINUED | OUTPATIENT
Start: 2022-02-14 | End: 2022-02-14 | Stop reason: SDUPTHER

## 2022-02-14 RX ORDER — ACETAMINOPHEN 325 MG/1
650 TABLET ORAL EVERY 4 HOURS PRN
Status: DISCONTINUED | OUTPATIENT
Start: 2022-02-14 | End: 2022-02-16 | Stop reason: HOSPADM

## 2022-02-14 RX ORDER — HYDROMORPHONE HCL 110MG/55ML
PATIENT CONTROLLED ANALGESIA SYRINGE INTRAVENOUS PRN
Status: DISCONTINUED | OUTPATIENT
Start: 2022-02-14 | End: 2022-02-14 | Stop reason: SDUPTHER

## 2022-02-14 RX ORDER — DEXTROSE MONOHYDRATE 50 MG/ML
100 INJECTION, SOLUTION INTRAVENOUS PRN
Status: DISCONTINUED | OUTPATIENT
Start: 2022-02-14 | End: 2022-02-16 | Stop reason: HOSPADM

## 2022-02-14 RX ORDER — SODIUM CHLORIDE 0.9 % (FLUSH) 0.9 %
10 SYRINGE (ML) INJECTION PRN
Status: DISCONTINUED | OUTPATIENT
Start: 2022-02-14 | End: 2022-02-16 | Stop reason: HOSPADM

## 2022-02-14 RX ORDER — DEXAMETHASONE SODIUM PHOSPHATE 10 MG/ML
INJECTION INTRAMUSCULAR; INTRAVENOUS PRN
Status: DISCONTINUED | OUTPATIENT
Start: 2022-02-14 | End: 2022-02-14 | Stop reason: SDUPTHER

## 2022-02-14 RX ORDER — SODIUM CHLORIDE 0.9 % (FLUSH) 0.9 %
10 SYRINGE (ML) INJECTION EVERY 12 HOURS SCHEDULED
Status: DISCONTINUED | OUTPATIENT
Start: 2022-02-14 | End: 2022-02-14 | Stop reason: HOSPADM

## 2022-02-14 RX ORDER — SODIUM CHLORIDE 0.9 % (FLUSH) 0.9 %
10 SYRINGE (ML) INJECTION EVERY 12 HOURS SCHEDULED
Status: DISCONTINUED | OUTPATIENT
Start: 2022-02-14 | End: 2022-02-16 | Stop reason: HOSPADM

## 2022-02-14 RX ORDER — ONDANSETRON 2 MG/ML
4 INJECTION INTRAMUSCULAR; INTRAVENOUS EVERY 6 HOURS PRN
Status: DISCONTINUED | OUTPATIENT
Start: 2022-02-14 | End: 2022-02-16 | Stop reason: HOSPADM

## 2022-02-14 RX ORDER — MORPHINE SULFATE 2 MG/ML
2 INJECTION, SOLUTION INTRAMUSCULAR; INTRAVENOUS
Status: DISCONTINUED | OUTPATIENT
Start: 2022-02-14 | End: 2022-02-15

## 2022-02-14 RX ADMIN — DEXAMETHASONE SODIUM PHOSPHATE 10 MG: 10 INJECTION INTRAMUSCULAR; INTRAVENOUS at 10:20

## 2022-02-14 RX ADMIN — LABETALOL HYDROCHLORIDE 2.5 MG: 5 INJECTION INTRAVENOUS at 13:57

## 2022-02-14 RX ADMIN — ROCURONIUM BROMIDE 10 MG: 10 SOLUTION INTRAVENOUS at 12:02

## 2022-02-14 RX ADMIN — ONDANSETRON 4 MG: 2 INJECTION INTRAMUSCULAR; INTRAVENOUS at 14:45

## 2022-02-14 RX ADMIN — HEPARIN SODIUM 4000 UNITS: 1000 INJECTION INTRAVENOUS; SUBCUTANEOUS at 13:27

## 2022-02-14 RX ADMIN — SODIUM CHLORIDE: 9 INJECTION, SOLUTION INTRAVENOUS at 10:26

## 2022-02-14 RX ADMIN — ROCURONIUM BROMIDE 40 MG: 10 SOLUTION INTRAVENOUS at 10:20

## 2022-02-14 RX ADMIN — FENTANYL CITRATE 50 MCG: 50 INJECTION, SOLUTION INTRAMUSCULAR; INTRAVENOUS at 10:40

## 2022-02-14 RX ADMIN — PROPOFOL 100 MG: 10 INJECTION, EMULSION INTRAVENOUS at 10:20

## 2022-02-14 RX ADMIN — PROTAMINE SULFATE 50 MG: 10 INJECTION, SOLUTION INTRAVENOUS at 14:42

## 2022-02-14 RX ADMIN — SODIUM CHLORIDE: 9 INJECTION, SOLUTION INTRAVENOUS at 10:17

## 2022-02-14 RX ADMIN — SODIUM CHLORIDE: 9 INJECTION, SOLUTION INTRAVENOUS at 17:26

## 2022-02-14 RX ADMIN — Medication 100 MCG: at 10:32

## 2022-02-14 RX ADMIN — Medication 100 MCG: at 11:00

## 2022-02-14 RX ADMIN — Medication 10 MG: at 11:12

## 2022-02-14 RX ADMIN — FENTANYL CITRATE 100 MCG: 50 INJECTION, SOLUTION INTRAMUSCULAR; INTRAVENOUS at 10:48

## 2022-02-14 RX ADMIN — LABETALOL HYDROCHLORIDE 2.5 MG: 5 INJECTION INTRAVENOUS at 14:01

## 2022-02-14 RX ADMIN — FENTANYL CITRATE 100 MCG: 50 INJECTION, SOLUTION INTRAMUSCULAR; INTRAVENOUS at 13:47

## 2022-02-14 RX ADMIN — LABETALOL HYDROCHLORIDE 5 MG: 5 INJECTION INTRAVENOUS at 13:51

## 2022-02-14 RX ADMIN — SODIUM CHLORIDE 25 ML: 9 INJECTION, SOLUTION INTRAVENOUS at 09:55

## 2022-02-14 RX ADMIN — Medication 3 MG: at 14:45

## 2022-02-14 RX ADMIN — HYDROMORPHONE HYDROCHLORIDE 1 MG: 2 INJECTION, SOLUTION INTRAMUSCULAR; INTRAVENOUS; SUBCUTANEOUS at 14:54

## 2022-02-14 RX ADMIN — VANCOMYCIN HYDROCHLORIDE 1000 MG: 1 INJECTION, POWDER, LYOPHILIZED, FOR SOLUTION INTRAVENOUS at 10:35

## 2022-02-14 RX ADMIN — ROCURONIUM BROMIDE 20 MG: 10 SOLUTION INTRAVENOUS at 13:24

## 2022-02-14 RX ADMIN — INSULIN LISPRO 1 UNITS: 100 INJECTION, SOLUTION INTRAVENOUS; SUBCUTANEOUS at 17:32

## 2022-02-14 RX ADMIN — Medication 0.6 MG: at 14:45

## 2022-02-14 RX ADMIN — INSULIN LISPRO 1 UNITS: 100 INJECTION, SOLUTION INTRAVENOUS; SUBCUTANEOUS at 20:21

## 2022-02-14 RX ADMIN — Medication 10 MG: at 10:45

## 2022-02-14 RX ADMIN — MIDAZOLAM 1 MG: 1 INJECTION INTRAMUSCULAR; INTRAVENOUS at 10:20

## 2022-02-14 RX ADMIN — SODIUM CHLORIDE: 9 INJECTION, SOLUTION INTRAVENOUS at 14:12

## 2022-02-14 RX ADMIN — Medication 100 MCG: at 10:39

## 2022-02-14 RX ADMIN — ACETAMINOPHEN 650 MG: 325 TABLET ORAL at 23:55

## 2022-02-14 RX ADMIN — Medication 10 ML: at 20:24

## 2022-02-14 RX ADMIN — FENTANYL CITRATE 50 MCG: 50 INJECTION, SOLUTION INTRAMUSCULAR; INTRAVENOUS at 12:11

## 2022-02-14 RX ADMIN — LIDOCAINE HYDROCHLORIDE 80 MG: 20 INJECTION, SOLUTION INTRAVENOUS at 10:20

## 2022-02-14 RX ADMIN — HEPARIN SODIUM 8000 UNITS: 1000 INJECTION INTRAVENOUS; SUBCUTANEOUS at 10:55

## 2022-02-14 RX ADMIN — HEPARIN SODIUM 5000 UNITS: 1000 INJECTION INTRAVENOUS; SUBCUTANEOUS at 12:14

## 2022-02-14 ASSESSMENT — PULMONARY FUNCTION TESTS
PIF_VALUE: 16
PIF_VALUE: 15
PIF_VALUE: 14
PIF_VALUE: 14
PIF_VALUE: 16
PIF_VALUE: 2
PIF_VALUE: 14
PIF_VALUE: 15
PIF_VALUE: 16
PIF_VALUE: 15
PIF_VALUE: 2
PIF_VALUE: 0
PIF_VALUE: 15
PIF_VALUE: 2
PIF_VALUE: 2
PIF_VALUE: 14
PIF_VALUE: 16
PIF_VALUE: 16
PIF_VALUE: 15
PIF_VALUE: 14
PIF_VALUE: 28
PIF_VALUE: 14
PIF_VALUE: 14
PIF_VALUE: 16
PIF_VALUE: 14
PIF_VALUE: 16
PIF_VALUE: 2
PIF_VALUE: 16
PIF_VALUE: 14
PIF_VALUE: 16
PIF_VALUE: 14
PIF_VALUE: 14
PIF_VALUE: 3
PIF_VALUE: 16
PIF_VALUE: 0
PIF_VALUE: 14
PIF_VALUE: 14
PIF_VALUE: 16
PIF_VALUE: 16
PIF_VALUE: 14
PIF_VALUE: 16
PIF_VALUE: 3
PIF_VALUE: 14
PIF_VALUE: 15
PIF_VALUE: 15
PIF_VALUE: 0
PIF_VALUE: 2
PIF_VALUE: 15
PIF_VALUE: 15
PIF_VALUE: 14
PIF_VALUE: 14
PIF_VALUE: 1
PIF_VALUE: 15
PIF_VALUE: 15
PIF_VALUE: 0
PIF_VALUE: 15
PIF_VALUE: 16
PIF_VALUE: 15
PIF_VALUE: 14
PIF_VALUE: 0
PIF_VALUE: 15
PIF_VALUE: 16
PIF_VALUE: 16
PIF_VALUE: 14
PIF_VALUE: 2
PIF_VALUE: 16
PIF_VALUE: 14
PIF_VALUE: 16
PIF_VALUE: 16
PIF_VALUE: 0
PIF_VALUE: 16
PIF_VALUE: 14
PIF_VALUE: 15
PIF_VALUE: 15
PIF_VALUE: 16
PIF_VALUE: 16
PIF_VALUE: 14
PIF_VALUE: 16
PIF_VALUE: 14
PIF_VALUE: 14
PIF_VALUE: 2
PIF_VALUE: 15
PIF_VALUE: 15
PIF_VALUE: 3
PIF_VALUE: 16
PIF_VALUE: 14
PIF_VALUE: 16
PIF_VALUE: 15
PIF_VALUE: 15
PIF_VALUE: 16
PIF_VALUE: 14
PIF_VALUE: 14
PIF_VALUE: 2
PIF_VALUE: 14
PIF_VALUE: 15
PIF_VALUE: 2
PIF_VALUE: 14
PIF_VALUE: 16
PIF_VALUE: 14
PIF_VALUE: 14
PIF_VALUE: 16
PIF_VALUE: 0
PIF_VALUE: 15
PIF_VALUE: 16
PIF_VALUE: 0
PIF_VALUE: 15
PIF_VALUE: 16
PIF_VALUE: 16
PIF_VALUE: 15
PIF_VALUE: 15
PIF_VALUE: 2
PIF_VALUE: 14
PIF_VALUE: 14
PIF_VALUE: 16
PIF_VALUE: 16
PIF_VALUE: 15
PIF_VALUE: 16
PIF_VALUE: 0
PIF_VALUE: 14
PIF_VALUE: 15
PIF_VALUE: 0
PIF_VALUE: 16
PIF_VALUE: 15
PIF_VALUE: 16
PIF_VALUE: 2
PIF_VALUE: 2
PIF_VALUE: 15
PIF_VALUE: 2
PIF_VALUE: 16
PIF_VALUE: 14
PIF_VALUE: 14
PIF_VALUE: 16
PIF_VALUE: 14
PIF_VALUE: 16
PIF_VALUE: 14
PIF_VALUE: 16
PIF_VALUE: 15
PIF_VALUE: 14
PIF_VALUE: 16
PIF_VALUE: 15
PIF_VALUE: 1
PIF_VALUE: 1
PIF_VALUE: 2
PIF_VALUE: 14
PIF_VALUE: 16
PIF_VALUE: 16
PIF_VALUE: 15
PIF_VALUE: 16
PIF_VALUE: 15
PIF_VALUE: 15
PIF_VALUE: 14
PIF_VALUE: 16
PIF_VALUE: 15
PIF_VALUE: 0
PIF_VALUE: 1
PIF_VALUE: 16
PIF_VALUE: 14
PIF_VALUE: 15
PIF_VALUE: 16
PIF_VALUE: 14
PIF_VALUE: 2
PIF_VALUE: 15
PIF_VALUE: 16
PIF_VALUE: 17
PIF_VALUE: 15
PIF_VALUE: 15
PIF_VALUE: 17
PIF_VALUE: 16
PIF_VALUE: 14
PIF_VALUE: 1
PIF_VALUE: 16
PIF_VALUE: 16
PIF_VALUE: 14
PIF_VALUE: 16
PIF_VALUE: 0
PIF_VALUE: 14
PIF_VALUE: 15
PIF_VALUE: 15
PIF_VALUE: 2
PIF_VALUE: 15
PIF_VALUE: 14
PIF_VALUE: 14
PIF_VALUE: 0
PIF_VALUE: 15
PIF_VALUE: 15
PIF_VALUE: 14
PIF_VALUE: 2
PIF_VALUE: 16
PIF_VALUE: 0
PIF_VALUE: 15
PIF_VALUE: 14
PIF_VALUE: 16
PIF_VALUE: 16
PIF_VALUE: 15
PIF_VALUE: 14
PIF_VALUE: 15
PIF_VALUE: 16
PIF_VALUE: 2
PIF_VALUE: 16
PIF_VALUE: 16
PIF_VALUE: 2
PIF_VALUE: 14
PIF_VALUE: 16
PIF_VALUE: 16
PIF_VALUE: 0
PIF_VALUE: 16
PIF_VALUE: 15
PIF_VALUE: 2
PIF_VALUE: 16
PIF_VALUE: 15
PIF_VALUE: 14
PIF_VALUE: 16
PIF_VALUE: 14
PIF_VALUE: 15
PIF_VALUE: 14
PIF_VALUE: 14
PIF_VALUE: 2
PIF_VALUE: 16
PIF_VALUE: 2
PIF_VALUE: 14
PIF_VALUE: 16
PIF_VALUE: 14
PIF_VALUE: 15
PIF_VALUE: 15
PIF_VALUE: 17
PIF_VALUE: 16
PIF_VALUE: 14
PIF_VALUE: 14
PIF_VALUE: 31
PIF_VALUE: 14
PIF_VALUE: 2
PIF_VALUE: 16
PIF_VALUE: 14
PIF_VALUE: 16
PIF_VALUE: 15
PIF_VALUE: 16
PIF_VALUE: 14
PIF_VALUE: 14
PIF_VALUE: 0
PIF_VALUE: 0
PIF_VALUE: 14
PIF_VALUE: 1
PIF_VALUE: 16
PIF_VALUE: 31
PIF_VALUE: 16
PIF_VALUE: 15
PIF_VALUE: 2
PIF_VALUE: 15
PIF_VALUE: 14
PIF_VALUE: 16
PIF_VALUE: 0
PIF_VALUE: 16
PIF_VALUE: 14
PIF_VALUE: 15
PIF_VALUE: 14
PIF_VALUE: 0
PIF_VALUE: 16
PIF_VALUE: 16
PIF_VALUE: 14
PIF_VALUE: 16
PIF_VALUE: 2
PIF_VALUE: 16
PIF_VALUE: 15
PIF_VALUE: 14
PIF_VALUE: 15
PIF_VALUE: 16
PIF_VALUE: 0
PIF_VALUE: 14
PIF_VALUE: 15
PIF_VALUE: 16
PIF_VALUE: 16
PIF_VALUE: 15
PIF_VALUE: 14
PIF_VALUE: 16
PIF_VALUE: 15
PIF_VALUE: 15
PIF_VALUE: 16
PIF_VALUE: 14
PIF_VALUE: 0
PIF_VALUE: 2
PIF_VALUE: 16
PIF_VALUE: 14
PIF_VALUE: 15
PIF_VALUE: 0
PIF_VALUE: 2
PIF_VALUE: 15
PIF_VALUE: 14
PIF_VALUE: 16
PIF_VALUE: 0
PIF_VALUE: 0
PIF_VALUE: 15
PIF_VALUE: 14
PIF_VALUE: 16
PIF_VALUE: 14
PIF_VALUE: 16
PIF_VALUE: 16
PIF_VALUE: 14
PIF_VALUE: 16
PIF_VALUE: 17

## 2022-02-14 ASSESSMENT — PAIN SCALES - GENERAL
PAINLEVEL_OUTOF10: 0
PAINLEVEL_OUTOF10: 4
PAINLEVEL_OUTOF10: 0

## 2022-02-14 ASSESSMENT — PAIN - FUNCTIONAL ASSESSMENT: PAIN_FUNCTIONAL_ASSESSMENT: 0-10

## 2022-02-14 NOTE — OP NOTE
Operative Note      Patient: Sukumar Dyson  YOB: 1940  MRN: [id number]    Date of Procedure: 2/14/2022    Pre-Op Diagnosis: ABDOMINAL AORTIC ANEURYSM without rupture. Post-Op Diagnosis: Same       Procedure(s):  ENDOVASCULAR REPAIR OF ABDOMINAL AORTIC ANEURYSM, WITH A FEMORAL TO FEMORAL BYPASS    Surgeon(s):  Brittney Harper MD    Assistant:   Surgical Assistant: Jacinto Munson  Resident: Flash Garber MD    Anesthesia: General    Estimated Blood Loss (mL): 220     Complications: None    Specimens:   * No specimens in log *    Implants:  Implant Name Type Inv. Item Serial No.  Lot No. LRB No. Used Action   GORE ECLUDER CONFORMABLE AAA ENDOPROSTHESIS [id number] 16FR. [id number]   N/A 1 Implanted   COIL EMB NIKI EXT EMBOLUS L14CM COILED [id number] CATH 0.035IN - [id number] Vascular coils COIL EMB NIKI EXT EMBOLUS L14CM COILED [id number] CATH 0.035IN  [place] Validity Sensors [facility]- [id number] Left 1 Implanted   COIL EMB MICRO . 018 14-8 JEANETTE - [id number] Vascular/Graft/Patch/Filter COIL EMB MICRO . 1 [facility] [id number] Left 1 Implanted   COIL EMB MICRO . 018 14-8 JEANETTE - [id number] Vascular/Graft/Patch/Filter COIL EMB MICRO . 1 [facility] [id number] Left 1 Implanted   GRAFT VASC L50CM DIA8MM RNG L40CM EPTFE THN WALLED CBAS HEP - [id number] Vascular grafts GRAFT VASC L50CM DIA8MM RNG L40CM EPTFE THN WALLED CBAS HEP [id number]  [employer] [facility]  Left 1 Implanted         Drains:   Urethral Catheter Non-latex 16 fr (Active)   Catheter Indications Need for fluid volume management of the critically ill patient in a critical care setting 02/14/22 1700   Site Assessment No urethral drainage 02/14/22 1700   Urine Color Tea 02/14/22 1700   Urine Appearance Cloudy 02/14/22 1700   Output (mL) 75 mL 02/14/22 1700       Findings:     Detailed Description of Procedure: The patient was identified and the procedure was confirmed.  The abdomen, groins and thighs were prepped and draped in the usual sterile fashion. Under ultrasound guidance, the right common femoral artery was percutaneously entered and a guidewire was advanced into the iliac artery. A 6-Chinese sheath was inserted into the artery. The sheath was removed and two Perclose Proglide closure devices were pre-deployed and the sutures were secured. An 8-Chinese was inserted into the artery. This repeated on the left side. The patient was heparinized maintaining an activated clotting time greater than 300 seconds. The right sheath was replaced over a Lunderquist wire with a 16-Chinese sheath that was advanced into the abdominal aorta. The left sheath was replaced over a Lunderquist wire with a 12-Chinese sheath that was advanced into the abdominal aorta. The main body device was inserted over a Lunderquist wire through the right femoral sheath and into the abdominal aorta. An arteriogram was obtained to identify the origins of the renal arteries, and the main body was deployed placing the gate to the patient's left. The gate was compressed due to the aortic calcifications. Multiple attempts were made to access the gate from below. The main body was deployed including the ipsilateral limb and a contralateral catheter was used to attempt access from within the main body of the graft. This was also not successful. An arteriogram was obtained that showed no evidence of an endovascular leak and there was no flow through the gate which was completely compressed. 3 coils were placed into the proximal left common iliac artery. Skin incisions were then made around each sheath and proximal distal control of the arteries was made. An 8 mm PTFE graft was pulled through a tunnel connecting the groin incisions. The right femoral sheath was removed and an arteriotomy was extended. The graft was spatulated and anastomosed to the side of the artery using a running 5-0 Prolene suture.   After completing the anastomosis the clamps were released and flow was restored to the right leg. The graft was clamped. The left femoral sheath was removed and the arteriotomy was extended. The graft was cut to the appropriate length and spatulated and anastomosed to the side of the artery using a running 5-0 Prolene suture. Prior to completing the anastomosis the graft was flushed and arteries were backbled and the anastomosis was completed and flow was restored to the left leg. Dorsalis pedis pulses were appreciated in both feet. The patient was given protamine and hemostasis was assured. The incisions were irrigated with saline solution. The incisions were approximated with multiple layers of Vicryl suture and Dermabond was applied to the skin incisions in the operating room. Needle, sponge and instrument counts were reported correct x2. The patient tolerated the procedure and was transferred to the CV-ICU in satisfactory condition.     Electronically signed by Emilee Nunez MD on 2/14/2022 at 5:11 PM

## 2022-02-14 NOTE — ANESTHESIA POSTPROCEDURE EVALUATION
Department of Anesthesiology  Postprocedure Note    Patient: Beatriz Barrios  MRN: [id number]  YOB: 1940  Date of evaluation: 2/14/2022  Time:  4:13 PM     Procedure Summary     Date: 02/14/22 Room / Location: [facility] / [facility]    Anesthesia Start: 1008 Anesthesia Stop: 4578    Procedure: ENDOVASCULAR REPAIR OF ABDOMINAL AORTIC ANEURYSM, WITH A FEMORAL TO FEMORAL BYPASS (N/A ) Diagnosis: (ABDOMINAL AORTIC ANEURYSM)    Surgeons: Ludivina Pineda MD Responsible Provider: Karin Mishra MD    Anesthesia Type: general ASA Status: 3          Anesthesia Type: general    Karla Phase I: Karla Score: 9    Karla Phase II:      Last vitals: Reviewed and per EMR flowsheets.        Anesthesia Post Evaluation    Patient location during evaluation: PACU  Patient participation: complete - patient participated  Level of consciousness: awake  Pain score: 0  Airway patency: patent  Nausea & Vomiting: no nausea  Complications: no  Cardiovascular status: hemodynamically stable  Respiratory status: acceptable  Hydration status: stable

## 2022-02-14 NOTE — PROGRESS NOTES
CVICU Admission Note    Name: Blas Meeks  MRN: [id number]    CC: Postoperative Critical Care Management     Indication for Surgery/Procedure: AAA    Important/Relevant PMH/PSH: HTN, HPL, CAD s/p CABG 2002, Prostate cancer, DM Type 2, pancreatitis, esophageal stricture, CKD     Procedure/Surgeries:2/14/2022 Endovascular abdominal aortic aneurysm repair, Femoral-femoral bypass       Physical Exam:    /85   Pulse 88   Temp 97.4 °F (36.3 °C) (Temporal)   Resp 16   Ht 5' 7\" (1.702 m)   Wt 147 lb (66.7 kg)   SpO2 100%   BMI 23.02 kg/m²     No results for input(s): WBC, RBC, HGB, HCT, MCV, MCH, MCHC, RDW, PLT, MPV in the last 72 hours. Recent Labs     02/14/22  0000   GLUCOSE 116       General Appearance: Pt seen in PACU. Extubated on SM, drowsy, opens eyes to voice. Hemodynamically stable. Eyes: PERRL  Pulmonary: Diminished bibasilar. No wheezes, no accessory muscle use noted on SM  Cardiovascular: RRR, no heaves or thrills palpated   Telemetry: SR with PACs  Abdomen: Soft  Extremities: BLE +DP/PT signals, no edema   Neurologic/Psych: Drowsy, MAEx4 to command  : slight hematuria noted, garcia in place   Skin: Warm and dry   Incision: Bilateral groins with aquacel intact, soft      Assessment/Plan: Day of Surgery     1.  AAA, PVD S/p EVAR, Fem-fem bypass  -Frequent neurovascular checks, monitor incision for signs of swelling/hematoma   - NPO, IVF @125cc/hr  - Garcia catheter to GD- slight hematuria noted, monitor  - Bedrest  - Perioperative Vancomycin   - PRN morphine/oxycodone for pain management   - Maintain SBP <170, start Nicardipine gtt if needed   - DM Type 2- SSI for glycemic control while inpatient  - CKD- baseline Scr 1.4, monitor renal function       Electronically signed by JUAN CARLOS Guevara - CNP on 2/14/2022 at 3:38 PM

## 2022-02-14 NOTE — PROGRESS NOTES
Patient admitted to CVICU at 56. All hemodynamic lines zeroed and calibrated. Vital signs are stable. Surgical sites are free from any drainage and hematomas. Patient family is at bedside. Will continue to monitor.     Electronically signed by Isadora Melchor on 2/14/22 at 4:41 PM EST

## 2022-02-14 NOTE — H&P
Vascular Surgery History and Physical             Chief Complaint   Patient presents with    Surgical Consult       AAA         Reason for Consult: Abdominal aortic aneurysm     Requesting Physician:  Dr. Jared Gracia     HISTORY OF PRESENT ILLNESS:                 The patient is a [de-identified] y.o. male who is referred for evaluation of abdominal aortic aneurysm. The patient is accompanied by his son. The patient has a history of prostate cancer and underwent a CAT scan as part of his work-up. He is being scheduled for radiation therapy. The CAT scan showed an aneurysm that increased in size and he is referred for evaluation.     Past Medical History:    Past Medical History             Diagnosis Date    AAA (abdominal aortic aneurysm) ([address]      Biliary stone      CAD (coronary artery disease) 2002     follows with Dr. Jackelyn Butt yearly    Carotid artery stenosis       Dr. Jessica Ellison per patient    Esophageal stricture       when eats has to eat slow    GERD (gastroesophageal reflux disease)      Hyperlipidemia      Hypertension      Pancreatic stent migration      Pancreatic stones      Pancreatitis       several times    Prostate cancer ([address]      Type 2 diabetes mellitus without complication ([address]           Past Surgical History:    Past Surgical History       Procedure Laterality Date    CORONARY ARTERY BYPASS GRAFT   2002    DIAGNOSTIC CARDIAC CATH LAB PROCEDURE   2002    ERCP        HERNIA REPAIR   2003    LITHOTRIPSY        OTHER SURGICAL HISTORY         pancreatic stent x2    DC LAP,CHOLECYSTECTOMY N/A 9/6/2018     CHOLECYSTECTOMY LAPAROSCOPIC ROBOTIC performed [facility]ivas [facility] OR         Current Medications:   Home Medications           Prior to Admission medications    Medication Sig Start Date End Date Taking?  Authorizing Provider   simvastatin (ZOCOR) 40 MG tablet TAKE 1 TABLET EVERY NIGHT 2/1/22   Yes Randi Montes De Oca PA-C   losartan (COZAAR) 100 MG tablet TAKE 1 since quittin.1    Smokeless tobacco: Never Used   Vaping Use    Vaping Use: Never used   Substance and Sexual Activity    Alcohol use: No    Drug use: No    Sexual activity: Never   Other Topics Concern    Not on file   Social History Narrative    Not on file      Social Determinants of Health          Financial Resource Strain: Low Risk     Difficulty of Paying Living Expenses: Not hard at all   Food Insecurity: No Food Insecurity    Worried About Running Out of Food in the Last Year: Never true    Cathryn of Food in the Last Year: Never true   Transportation Needs:     Lack of Transportation (Medical): Not on file    Lack of Transportation (Non-Medical):  Not on file   Physical Activity:     Days of Exercise per Week: Not on file    Minutes of Exercise per Session: Not on file   Stress:     Feeling of Stress : Not on file   Social Connections:     Frequency of Communication with Friends and Family: Not on file    Frequency of Social Gatherings with Friends and Family: Not on file    Attends Shinto Services: Not on file    Active Member of [address] [place] [address] or Organizations: Not on file    Attends Club or Organization Meetings: Not on file    Marital Status: Not on file   Intimate Partner Violence:     Fear of Current or Ex-Partner: Not on file    Emotionally Abused: Not on file    Physically Abused: Not on file    Sexually Abused: Not on file   Housing Stability:     Unable to Pay for Housing in the Last Year: Not on file    Number of [place] in the Last Year: Not on file    Unstable Housing in the Last Year: Not on file            Family History         Family History   Problem Relation Age of Onset    Heart Disease Mother      High Blood Pressure Mother      Heart Disease Father      High Blood Pressure Father      Cancer Maternal Grandfather              REVIEW OF SYSTEMS (New symptoms):     Eyes:                                       Blurred vision:             No [x]?/Yes []?                                                  Diplopia:                      No [x]?/Yes []? Vision loss:                  No [x]?/Yes []? Ears, nose, throat:                Hearing loss:               No [x]?/Yes []? Vertigo:                        No [x]?/Yes []? Swallowing problem:   No [x]?/Yes []? Nose bleeds:               No [x]?/Yes []? Voice hoarseness:      No [x]?/Yes []? Respiratory:                           Cough:                        No [x]?/Yes []? Pleuritic chest pain:     No [x]?/Yes []? Dyspnea:                     No [x]?/Yes []? Wheezing:                   No [x]?/Yes []? Cardiovascular:                     Angina:                        No [x]?/Yes []? Palpitations:                No [x]?/Yes []? Claudication:               No [x]?/Yes []? Leg swelling:               No [x]?/Yes []? Gastrointestinal:                   Nausea or vomiting:    No [x]?/Yes []? Abdominal pain:          No [x]?/Yes []? Intestinal bleeding:      No [x]?/Yes []? Musculoskeletal:                   Leg pain:                     No [x]?/Yes []? Back pain:                   No [x]?/Yes []? Weakness:                  No [x]?/Yes []?   Neurologic:                            Numbness: No [x]?/Yes []? Paralysis:                    No [x]?/Yes []? Headaches:                 No [x]?/Yes []? Hematologic, lymphatic:      Anemia:                       No [x]?/Yes []? Bleeding or bruising:   No [x]?/Yes []? Fevers or chills:           No [x]?/Yes []? Endocrine:                             Temp intolerance:       No [x]?/Yes []? Polydipsia, polyuria:    No [x]?/Yes []? Skin:                                       Rash:                           No [x]?/Yes []? Ulcers:                         No [x]?/Yes []? Abnorm pigment:        No [x]?/Yes []? :                                          Frequency/urgency:    No []?/Yes [x]? Hematuria:                  No [x]?/Yes []?                                                   Incontinence:               No [x]?/Yes []?     PHYSICAL EXAM:      Vitals:     02/08/22 0904   BP: 112/72      General Appearance: alert and oriented to person, place and time, well developed and well- nourished, in no acute distress  Skin: warm and dry, no rash or erythema  Head: normocephalic and atraumatic  Eyes: extraocular eye movements intact, conjunctivae normal  ENT: external ear and ear canal normal bilaterally, nose without deformity  Pulmonary/Chest: clear to auscultation bilaterally- no wheezes, rales or rhonchi, normal air movement, no respiratory distress  Cardiovascular: normal rate, regular rhythm, normal S1 and S2, no murmurs, no carotid bruits  Abdomen: soft, non-tender, non-distended, normal bowel sounds, no masses or organomegaly  Musculoskeletal: normal range of motion, no joint swelling, deformity or tenderness  Neurologic: no cranial nerve deficit, gait, coordination and speech normal  Extremities: no leg edema bilaterally     PULSE EXAM      Right      Left   Brachial       Radial       Femoral       Popliteal       Dorsalis Pedis       Posterior Tibial 2 2   (3=normal, 2=diminished, 1=barely palpable, 4=widened)             Problem List Items Addressed This Visit           AAA (abdominal aortic aneurysm) without rupture (HCC) - Primary                 I reviewed the natural history and treatment options of aortic aneurysms with the patient and his son. There is not adequate neck below the renal arteries before the start of the dilation for a standard graft. However, I do not feel he is a candidate for an open repair due to his comorbidities and significant risk for cardiac and pulmonary complications. There is some preserved aorta just distal to the renal arteries and my impression is that a endovascular graft will seal.  I reviewed with them that this does not strictly follow the guidelines but I feel is acceptable risk compared to an open repair or a fenestrated graft. I feel a fenestrated graft would put him at significant risk for dialysis dependent renal failure. They understand and agree to plans for endovascular repair.   Dr. Regina Camarena cleared patient for surgery    Electronically signed by Estela Hand MD on 2/14/2022 at 9:34 AM

## 2022-02-14 NOTE — ANESTHESIA PROCEDURE NOTES
Arterial Line:    An arterial line was placed using surface landmarks, in the OR for the following indication(s): continuous blood pressure monitoring and blood sampling needed. A 20 gauge (size), 1 and 1/4 inch (length), Arrow (type) catheter was placed, into the right radial artery, secured by tape and Tegaderm. Anesthesia type: General    Events:  patient tolerated procedure well with no complications. Other anesthesia staff: Yajaira Gan RN  Performed:  Other anesthesia staff   Preanesthetic Checklist  Completed: patient identified, IV checked, site marked, risks and benefits discussed, surgical consent, monitors and equipment checked, pre-op evaluation, timeout performed, anesthesia consent given, oxygen available and patient being monitored

## 2022-02-14 NOTE — PROGRESS NOTES
Patient's room ready in cvic. Nurse practitioner  Sanjana rod to assess patient.  Patient's pulses checked  with doppler

## 2022-02-14 NOTE — ANESTHESIA PRE PROCEDURE
Department of Anesthesiology  Preprocedure Note       Name:  Rudy Josue   Age:  [de-identified] y.o.  :  1940                                          MRN:  [id number]         Date:  2022      Surgeon: Gurpreet Curry):  Britni Correa MD    Procedure: Procedure(s):  ENDOVASCULAR REPAIR OF ABDOMINAL AORTIC ANEURYSM    Medications prior to admission:   Prior to Admission medications    Medication Sig Start Date End Date Taking? Authorizing Provider   simvastatin (ZOCOR) 40 MG tablet TAKE 1 TABLET EVERY NIGHT 22  Yes Rex Aragon PA-C   losartan (COZAAR) 100 MG tablet TAKE 1 TABLET EVERY MORNING 21  Yes Rigo Velázquez DO   metoprolol succinate (TOPROL XL) 50 MG extended release tablet TAKE 1 TABLET EVERY DAY  Patient taking differently: Take 50 mg by mouth at bedtime  21  Yes Rigo Velázquez DO   isosorbide mononitrate (IMDUR) 30 MG extended release tablet TAKE 1 TABLET EVERY DAY 21  Yes Thang Ingram DO   fenofibrate micronized (LOFIBRA) 134 MG capsule TAKE 1 CAPSULE EVERY DAY 21  Yes Thang Ingram DO   glimepiride (AMARYL) 2 MG tablet TAKE 1 TABLET EVERY MORNING 10/25/21  Yes Rex Aragon PA-C   JARDIANCE 10 MG tablet TAKE 1 TABLET EVERY DAY 10/19/21  Yes Rex Aragon PA-C   metFORMIN (GLUCOPHAGE) 1000 MG tablet TAKE 1 TABLET TWICE DAILY WITH MEALS 10/14/21  Yes Rex Aragon PA-C   FreeStyle Lancets MISC USE DAILY  9/10/21  Yes Rex Aragon PA-C   blood glucose test strips (FREESTYLE LITE) strip 1 each by Other route daily As needed.  21  Yes Rex Aragon PA-C   Multiple Vitamins-Minerals (CENTRUM SILVER 50+MEN PO) Take by mouth   Yes Historical Provider, MD   lipase-protease-amylase (CREON) 69600-04673 units delayed release capsule Take 1 capsule by mouth 3 times daily (with meals)  Patient taking differently: Take 1 capsule by mouth as needed (usual after he goes out to eat)  8/10/18  Yes Pito Mendoza III, MD   aspirin 81 MG EC tablet Take 81 mg by mouth daily    Yes Historical Provider, MD       Current medications:    Current Facility-Administered Medications   Medication Dose Route Frequency Provider Last Rate Last Admin    sodium chloride flush 0.9 % injection 10 mL  10 mL IntraVENous 2 times per day JUAN CARLOS Blackburn - CNP        sodium chloride flush 0.9 % injection 10 mL  10 mL IntraVENous PRN Aravind Gonzalez APRN - CNP        0.9 % sodium chloride infusion  25 mL IntraVENous PRN Aravind Gonzalez APRN -  mL/hr at 02/14/22 0955 25 mL at 02/14/22 0955    vancomycin 1000 mg IVPB in 250 mL D5W addavial  1,000 mg IntraVENous On Call to [address] [facility], APRN - CNP           Allergies: Allergies   Allergen Reactions    Pcn [Penicillins] Swelling     Hives, swelling when 10year old.     Grass Extracts [Gramineae Pollens]      roses    Percocet [Oxycodone-Acetaminophen] Other (See Comments)     dizzy       Problem List:    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 Sapp Knows yearly    Carotid artery stenosis     Dr. Cornell Cornea per patient    Esophageal stricture     when eats has to eat slow    GERD (gastroesophageal reflux disease)     Hyperlipidemia     Hypertension     Pancreatic stent migration     Pancreatic stones     Pancreatitis     several times    Prostate cancer ([address]     Type 2 diabetes mellitus without complication ([address]        Past Surgical History:        Procedure Laterality Date    CORONARY ARTERY BYPASS GRAFT      DIAGNOSTIC CARDIAC CATH LAB PROCEDURE      ERCP      HERNIA REPAIR  2003    LITHOTRIPSY      OTHER SURGICAL HISTORY      pancreatic stent x2    NC LAP,CHOLECYSTECTOMY N/A 2018    CHOLECYSTECTOMY LAPAROSCOPIC ROBOTIC performed by Leonard Urrutia MD at [facility] History:    Social History     Tobacco Use    Smoking status: Former Smoker     Packs/day: 0.00     Years: 0.00     Pack years: 0.00     Types: Cigars     Start date: 1960     Quit date: 1998     Years since quittin.1    Smokeless tobacco: Never Used   Substance Use Topics    Alcohol use: No                                Counseling given: Not Answered      Vital Signs (Current):   Vitals:    22 1538 22 0912   BP:  (!) 175/90   Pulse:  60   Resp:  20   Temp:  97.8 °F (36.6 °C)   TempSrc:  Temporal   SpO2:  96%   Weight:  147 lb (66.7 kg)   Height: 5' 7\" (1.702 m) 5' 7\" (1.702 m)                                              BP Readings from Last 3 Encounters:   22 (!) 175/90   02/10/22 121/70   22 112/72       NPO Status: Time of last liquid consumption:                         Time of last solid consumption:                         Date of last liquid consumption: 22                        Date of last solid food consumption: 22    BMI:   Wt Readings from Last 3 Encounters:   22 147 lb (66.7 kg)   02/10/22 147 lb (66.7 kg)   22 152 lb (68.9 kg)     Body mass index is 23.02 kg/m².     CBC:   Lab Results   Component Value Date    WBC 7.6 02/10/2022    RBC 4.60 02/10/2022    HGB 14.4 02/10/2022    HCT 44.4 02/10/2022    MCV 96.5 02/10/2022    RDW 14.6 02/10/2022  02/10/2022       CMP:   Lab Results   Component Value Date     02/10/2022    K 4.4 02/10/2022     02/10/2022    CO2 18 02/10/2022    BUN 40 02/10/2022    CREATININE 1.4 02/10/2022    GFRAA 59 02/10/2022    LABGLOM 49 02/10/2022    GLUCOSE 116 02/14/2022    PROT 6.5 01/12/2022    CALCIUM 8.7 02/10/2022    BILITOT 0.5 01/12/2022    ALKPHOS 37 01/12/2022    AST 24 01/12/2022    ALT 23 01/12/2022       POC Tests: No results for input(s): POCGLU, POCNA, POCK, POCCL, POCBUN, POCHEMO, POCHCT in the last 72 hours. Coags:   Lab Results   Component Value Date    PROTIME 11.3 02/10/2022    INR 1.0 02/10/2022       HCG (If Applicable): No results found for: PREGTESTUR, PREGSERUM, HCG, HCGQUANT     ABGs: No results found for: PHART, PO2ART, QLO4WCR, PSL1GLC, BEART, N4ESKKNK     Type & Screen (If Applicable):  No results found for: LABABO, LABRH    Drug/Infectious Status (If Applicable):  No results found for: HIV, HEPCAB    COVID-19 Screening (If Applicable): No results found for: COVID19        Anesthesia Evaluation    Airway: Mallampati: II  TM distance: >3 FB   Neck ROM: full  Mouth opening: > = 3 FB Dental: normal exam   (+) upper dentures      Pulmonary: breath sounds clear to auscultation                             Cardiovascular:    (+) hypertension:, CAD:, CABG/stent:,         Rhythm: regular                      Neuro/Psych:               GI/Hepatic/Renal:   (+) GERD:, liver disease:,          ROS comment: Hepatic steatosis. Endo/Other:    (+) DiabetesType II DM, , malignancy/cancer. ROS comment: Prostate CA, Accucheck 116 Abdominal:             Vascular:   + PVD, aortic or cerebral, . Other Findings:           Anesthesia Plan      general     ASA 3       Induction: intravenous. arterial line  MIPS: Postoperative opioids intended and Prophylactic antiemetics administered. Anesthetic plan and risks discussed with patient.     Use of blood products discussed with patient whom consented to blood products. Plan discussed with CRNA.                   Hunter Vazquez MD   2/14/2022

## 2022-02-15 LAB
ANION GAP SERPL CALCULATED.3IONS-SCNC: 11 MMOL/L (ref 7–16)
ANION GAP: 7 MMOL/L (ref 7–16)
B.E.: -6 MMOL/L (ref -3–3)
BUN BLDV-MCNC: 36 MG/DL (ref 6–23)
CALCIUM SERPL-MCNC: 7.5 MG/DL (ref 8.6–10.2)
CARDIOPULMONARY BYPASS: NO
CHLORIDE BLD-SCNC: 113 MMOL/L (ref 98–107)
CO2: 19 MMOL/L (ref 22–29)
CREAT SERPL-MCNC: 1.2 MG/DL (ref 0.7–1.2)
DEVICE: ABNORMAL
GFR AFRICAN AMERICAN: >60
GFR AFRICAN AMERICAN: >60
GFR NON-AFRICAN AMERICAN: 58 ML/MIN/1.73
GFR, ESTIMATED: 53 ML/MIN/1.73
GLUCOSE BLD-MCNC: 153 MG/DL (ref 74–99)
GLUCOSE BLD-MCNC: 177 MG/DL (ref 74–99)
HCO3 ARTERIAL: 18.8 MMOL/L (ref 22–26)
HCT (EST): 36 % (ref 37–54)
HCT VFR BLD CALC: 39.2 % (ref 37–54)
HEMOGLOBIN: 12.5 G/DL (ref 12.5–16.5)
HGB, (EST): 12.4 G/DL (ref 12.5–15.5)
MCH RBC QN AUTO: 31.2 PG (ref 26–35)
MCHC RBC AUTO-ENTMCNC: 31.9 % (ref 32–34.5)
MCV RBC AUTO: 97.8 FL (ref 80–99.9)
METER GLUCOSE: 137 MG/DL (ref 74–99)
METER GLUCOSE: 146 MG/DL (ref 74–99)
METER GLUCOSE: 175 MG/DL (ref 74–99)
METER GLUCOSE: 175 MG/DL (ref 74–99)
O2 SATURATION: 99.6 % (ref 92–98.5)
OPERATOR ID: ABNORMAL
PCO2 ARTERIAL: 33.8 MMHG (ref 35–45)
PDW BLD-RTO: 14.8 FL (ref 11.5–15)
PH BLOOD GAS: 7.35 (ref 7.35–7.45)
PLATELET # BLD: 191 E9/L (ref 130–450)
PMV BLD AUTO: 10.1 FL (ref 7–12)
PO2 ARTERIAL: 184.8 MMHG (ref 60–80)
POC ACT LR: 140 SECONDS
POC ACT LR: 153 SECONDS
POC ACT LR: 236 SECONDS
POC ACT LR: 269 SECONDS
POC ACT LR: 308 SECONDS
POC ACT LR: 313 SECONDS
POC ACT LR: 335 SECONDS
POC ACT LR: 372 SECONDS
POC BUN: 35 MG/DL (ref 8–23)
POC CHLORIDE: 116 MMOL/L (ref 100–108)
POC CO2: 19.7 MMOL/L (ref 22–29)
POC CREATININE: 1.3 MG/DL (ref 0.7–1.2)
POC IONIZED CALCIUM: 1.1 (ref 1.1–1.3)
POC LACTIC ACID: 0.8 (ref 0.5–2.2)
POC SODIUM: 143 MMOL/L (ref 132–146)
POTASSIUM SERPL-SCNC: 4.1 MMOL/L (ref 3.5–5)
POTASSIUM SERPL-SCNC: 4.3 MMOL/L (ref 3.5–5.5)
RBC # BLD: 4.01 E12/L (ref 3.8–5.8)
SODIUM BLD-SCNC: 143 MMOL/L (ref 132–146)
SOURCE, BLOOD GAS: ABNORMAL
WBC # BLD: 7.6 E9/L (ref 4.5–11.5)

## 2022-02-15 PROCEDURE — 80048 BASIC METABOLIC PNL TOTAL CA: CPT

## 2022-02-15 PROCEDURE — 2000000000 HC ICU R&B

## 2022-02-15 PROCEDURE — 6370000000 HC RX 637 (ALT 250 FOR IP): Performed by: NURSE PRACTITIONER

## 2022-02-15 PROCEDURE — 36415 COLL VENOUS BLD VENIPUNCTURE: CPT

## 2022-02-15 PROCEDURE — 2700000000 HC OXYGEN THERAPY PER DAY

## 2022-02-15 PROCEDURE — 6360000002 HC RX W HCPCS: Performed by: NURSE PRACTITIONER

## 2022-02-15 PROCEDURE — 85027 COMPLETE CBC AUTOMATED: CPT

## 2022-02-15 PROCEDURE — 2580000003 HC RX 258: Performed by: NURSE PRACTITIONER

## 2022-02-15 PROCEDURE — 37799 UNLISTED PX VASCULAR SURGERY: CPT

## 2022-02-15 PROCEDURE — 82962 GLUCOSE BLOOD TEST: CPT

## 2022-02-15 RX ADMIN — ASPIRIN 81 MG: 81 TABLET, COATED ORAL at 08:10

## 2022-02-15 RX ADMIN — LOSARTAN POTASSIUM 100 MG: 50 TABLET, FILM COATED ORAL at 08:10

## 2022-02-15 RX ADMIN — FENOFIBRATE 160 MG: 160 TABLET ORAL at 08:10

## 2022-02-15 RX ADMIN — Medication 10 ML: at 20:59

## 2022-02-15 RX ADMIN — Medication 10 ML: at 08:11

## 2022-02-15 RX ADMIN — INSULIN LISPRO 1 UNITS: 100 INJECTION, SOLUTION INTRAVENOUS; SUBCUTANEOUS at 11:42

## 2022-02-15 RX ADMIN — INSULIN LISPRO 1 UNITS: 100 INJECTION, SOLUTION INTRAVENOUS; SUBCUTANEOUS at 07:52

## 2022-02-15 RX ADMIN — INSULIN LISPRO 1 UNITS: 100 INJECTION, SOLUTION INTRAVENOUS; SUBCUTANEOUS at 20:54

## 2022-02-15 RX ADMIN — ATORVASTATIN CALCIUM 20 MG: 20 TABLET, FILM COATED ORAL at 20:54

## 2022-02-15 RX ADMIN — ISOSORBIDE MONONITRATE 30 MG: 30 TABLET, EXTENDED RELEASE ORAL at 08:10

## 2022-02-15 RX ADMIN — VANCOMYCIN HYDROCHLORIDE 1000 MG: 1 INJECTION, POWDER, LYOPHILIZED, FOR SOLUTION INTRAVENOUS at 08:21

## 2022-02-15 ASSESSMENT — PAIN SCALES - GENERAL
PAINLEVEL_OUTOF10: 0
PAINLEVEL_OUTOF10: 0
PAINLEVEL_OUTOF10: 4
PAINLEVEL_OUTOF10: 0

## 2022-02-15 ASSESSMENT — PAIN DESCRIPTION - LOCATION: LOCATION: GROIN

## 2022-02-15 ASSESSMENT — PAIN DESCRIPTION - ORIENTATION: ORIENTATION: RIGHT;LEFT

## 2022-02-15 ASSESSMENT — PAIN DESCRIPTION - DESCRIPTORS: DESCRIPTORS: ACHING

## 2022-02-15 ASSESSMENT — PAIN DESCRIPTION - PAIN TYPE: TYPE: ACUTE PAIN

## 2022-02-15 NOTE — CARE COORDINATION
POD #1 s/p evar and fem to fem bypass. Met with pt in room. He lives alone in a 2 story home with b&b on 2nd floor. He also has a bathroom and shower in basement that he uses. There is no rails on those steps, Pt is independent in ADLs. He does not own any DME. PCP is Hailey Ceballos and preferred pharmacy is [facility]. Pt states that his 2 sons will be staying with him, to assist initially. Discussed home healthcare. Pt was hesitant but agreed. Offered choices, he has no preference. Referral made to [address] [place] [address]. Pt states one of his sons will transport home.

## 2022-02-15 NOTE — PLAN OF CARE
Problem: Falls - Risk of:  Goal: Will remain free from falls  Description: Will remain free from falls  2/15/2022 0841 by Raul Bull RN  Outcome: Met This Shift     Problem: Skin Integrity:  Goal: Will show no infection signs and symptoms  Description: Will show no infection signs and symptoms  2/15/2022 0841 by Raul Bull RN  Outcome: Met This Shift     Problem: Airway Clearance - Ineffective:  Goal: Ability to maintain a clear airway will improve  Description: Ability to maintain a clear airway will improve  2/15/2022 0841 by Raul Bull RN  Outcome: Met This Shift     Problem: Anxiety/Stress:  Goal: Level of anxiety will decrease  Description: Level of anxiety will decrease  2/15/2022 0841 by Raul Bull RN  Outcome: Met This Shift     Problem: Aspiration:  Goal: Absence of aspiration  Description: Absence of aspiration  2/15/2022 0841 by Raul Bull RN  Outcome: Met This Shift     Problem: Cardiac Output - Decreased:  Goal: Hemodynamic stability will improve  Description: Hemodynamic stability will improve  2/15/2022 0841 by Raul Bull RN  Outcome: Met This Shift     Problem: Fluid Volume - Imbalance:  Goal: Absence of imbalanced fluid volume signs and symptoms  Description: Absence of imbalanced fluid volume signs and symptoms  2/15/2022 0841 by Raul Bull RN  Outcome: Met This Shift     Problem: Gas Exchange - Impaired:  Goal: Levels of oxygenation will improve  Description: Levels of oxygenation will improve  2/15/2022 0841 by Raul Bull RN  Outcome: Met This Shift

## 2022-02-15 NOTE — PROGRESS NOTES
CVICU Progress Note    Name: Juan Lacy  MRN: [id number]    CC: Postoperative Critical Care Management      Indication for Surgery/Procedure: AAA     Important/Relevant PMH/PSH: HTN, HPL, CAD s/p CABG 2002, Prostate cancer, DM Type 2, pancreatitis, esophageal stricture, CKD      Procedure/Surgeries:2/14/2022 Endovascular abdominal aortic aneurysm repair, Femoral-femoral bypass         Intake/Output Summary (Last 24 hours) at 2/15/2022 0831  Last data filed at 2/15/2022 0745  Gross per 24 hour   Intake 2968.75 ml   Output 1745 ml   Net 1223.75 ml       Recent Labs     02/15/22  0505   WBC 7.6   HGB 12.5   HCT 39.2         Lab Results   Component Value Date     02/15/2022    K 4.1 02/15/2022    K 4.4 02/10/2022     02/15/2022    CO2 19 02/15/2022    BUN 36 02/15/2022    CREATININE 1.2 02/15/2022    GLUCOSE 153 02/15/2022    CALCIUM 7.5 02/15/2022         Physical Exam:    BP (!) 154/69   Pulse 69   Temp 97.8 °F (36.6 °C) (Temporal)   Resp 21   Ht 5' 7\" (1.702 m)   Wt 147 lb (66.7 kg)   SpO2 97%   BMI 23.02 kg/m²       General: Awake, alert. Up to chair this morning,   Eyes: PERRL, anicteric   Pulmonary: Diminished bibasilar. No wheezes, no accessory muscle use noted on room air   Cardiovascular:  RRR, no heaves or thrills on palpation  Tele: SR with PACs  Abdomen: Soft, nontender, + BS   Extremities: Palpable pulses all extremities, no edema   Neurologic/Psych: A&Ox3, VALDES to command   Skin: Warm and dry  Incisions: Bilateral groins with aquacel dressings intact       Assessment/Plan: POD #1    1.  AAA, PVD S/p EVAR, Fem-fem bypass  -continue neurovascular checks  - advance diet, stop IVF   - Mitchell removed, monitor for void   - OOBTC, ambulate with nursing   - Perioperative Vancomycin   - PRN morphine/oxycodone for pain management   - DM Type 2- SSI for glycemic control while inpatient, resume home oral agents at discharge   - CKD- baseline Scr 1.4, stable at 1.2 this AM     Dispo: Transfer to [facility]     Electronically signed by JUAN CARLOS Anguiano CNP on 2/15/2022 at 8:31 AM

## 2022-02-15 NOTE — FLOWSHEET NOTE
Mitchell removed, balloon intact with no complications. Patient due to void by 1600. Arterial line removed pressure held for 10 mins, no bleeding or hematoma pressure dressing applied. Patient up to chair for breakfast, ambulated 1 assist. Slightly unsteady on feet.

## 2022-02-15 NOTE — PLAN OF CARE
Problem: Falls - Risk of:  Goal: Will remain free from falls  Description: Will remain free from falls  2/14/2022 2223 by Manohar Horowitz RN  Outcome: Met This Shift  2/14/2022 1644 by Nica Vallejo  Outcome: Met This Shift  Goal: Absence of physical injury  Description: Absence of physical injury  2/14/2022 2223 by Manohar Horowitz RN  Outcome: Met This Shift  2/14/2022 1644 by Nica Vallejo  Outcome: Met This Shift     Problem: Skin Integrity:  Goal: Will show no infection signs and symptoms  Description: Will show no infection signs and symptoms  2/14/2022 2223 by Manohar Horowitz RN  Outcome: Met This Shift  2/14/2022 1644 by Nica Vallejo  Outcome: Met This Shift  Goal: Absence of new skin breakdown  Description: Absence of new skin breakdown  2/14/2022 2223 by Manohar Horowitz RN  Outcome: Met This Shift  2/14/2022 1644 by Nica Vallejo  Outcome: Met This Shift     Problem: Discharge Planning:  Goal: Participates in care planning  Description: Participates in care planning  Outcome: Met This Shift     Problem: Airway Clearance - Ineffective:  Goal: Ability to maintain a clear airway will improve  Description: Ability to maintain a clear airway will improve  Outcome: Met This Shift     Problem: Anxiety/Stress:  Goal: Level of anxiety will decrease  Description: Level of anxiety will decrease  Outcome: Met This Shift     Problem: Aspiration:  Goal: Absence of aspiration  Description: Absence of aspiration  Outcome: Met This Shift     Problem:  Bowel Function - Altered:  Goal: Bowel elimination is within specified parameters  Description: Bowel elimination is within specified parameters  Outcome: Met This Shift     Problem: Cardiac Output - Decreased:  Goal: Hemodynamic stability will improve  Description: Hemodynamic stability will improve  Outcome: Met This Shift     Problem: Fluid Volume - Imbalance:  Goal: Absence of imbalanced fluid volume signs and symptoms  Description: Absence of imbalanced fluid volume signs and symptoms  Outcome: Met This Shift     Problem: Gas Exchange - Impaired:  Goal: Levels of oxygenation will improve  Description: Levels of oxygenation will improve  Outcome: Met This Shift     Problem: Pain:  Goal: Pain level will decrease  Description: Pain level will decrease  Outcome: Met This Shift  Goal: Recognizes and communicates pain  Description: Recognizes and communicates pain  Outcome: Met This Shift  Goal: Control of acute pain  Description: Control of acute pain  Outcome: Met This Shift  Goal: Control of chronic pain  Description: Control of chronic pain  Outcome: Met This Shift     Problem: Serum Glucose Level - Abnormal:  Goal: Ability to maintain appropriate glucose levels will improve to within specified parameters  Description: Ability to maintain appropriate glucose levels will improve to within specified parameters  Outcome: Met This Shift     Problem: Skin Integrity - Impaired:  Goal: Will show no infection signs and symptoms  Description: Will show no infection signs and symptoms  Outcome: Met This Shift  Goal: Absence of new skin breakdown  Description: Absence of new skin breakdown  Outcome: Met This Shift     Problem: Tissue Perfusion, Altered:  Goal: Circulatory function within specified parameters  Description: Circulatory function within specified parameters  Outcome: Met This Shift     Problem: Tissue Perfusion - Cardiopulmonary, Altered:  Goal: Absence of angina  Description: Absence of angina  Outcome: Met This Shift  Goal: Hemodynamic stability will improve  Description: Hemodynamic stability will improve  Outcome: Met This Shift

## 2022-02-15 NOTE — PROGRESS NOTES
Vascular Surgery Progress Note    Pt is being seen in f/u today regarding AAA    Subjective  Pt s/e. POD 1 EVAR, L common iliac artery coiling, fem-fem bypass. He is doing well this morning, sore suprapubic but states tylenol is working welll. HTN to 170s. States he is hungry, denies abdominal pain, nausea, emesis. UOP 1450    Current Medications:    sodium chloride      niCARdipine      dextrose        sodium chloride flush, sodium chloride, acetaminophen, oxyCODONE, morphine **OR** morphine, ondansetron, glucose, dextrose, glucagon (rDNA), dextrose    aspirin  81 mg Oral Daily    fenofibrate  160 mg Oral Daily    isosorbide mononitrate  30 mg Oral Daily    losartan  100 mg Oral Daily    metoprolol succinate  50 mg Oral Nightly    atorvastatin  20 mg Oral Nightly    sodium chloride flush  10 mL IntraVENous 2 times per day    insulin lispro  0-6 Units SubCUTAneous TID WC    insulin lispro  0-3 Units SubCUTAneous Nightly    vancomycin  1,000 mg IntraVENous Q24H        PHYSICAL EXAM:    BP (!) 144/71   Pulse 63   Temp 97.6 °F (36.4 °C) (Temporal)   Resp 20   Ht 5' 7\" (1.702 m)   Wt 147 lb (66.7 kg)   SpO2 99%   BMI 23.02 kg/m²     Intake/Output Summary (Last 24 hours) at 2/15/2022 0727  Last data filed at 2/15/2022 0700  Gross per 24 hour   Intake 2750 ml   Output 1600 ml   Net 1150 ml          Gen: awake, alert and oriented x3, no apparent distress  CVS: RRR  Resp: No increased work of breathing on 2 L NC  Abd: Soft, mildly tender suprapubic, non-distended  R LE: R groin w/ aquacell dressing, soft, appropriately tender, no hematoma DP 1+  L LE: L groin w/ aquacell dressing, soft, appropriately tender, no hematoma DP 1+    LABS:    Lab Results   Component Value Date    WBC 7.6 02/15/2022    HGB 12.5 02/15/2022    HCT 39.2 02/15/2022     02/15/2022    PROTIME 11.3 02/10/2022    INR 1.0 02/10/2022    K 4.1 02/15/2022    BUN 36 (H) 02/15/2022    CREATININE 1.2 02/15/2022       A/P  [de-identified] y.o. male with AAA s/p EVAR, L common iliac artery embolization, fem-fem bypass 2/14.    - Remove garcia, remove arterial line  - Cardiac diet  - OOB to chair  - Wean O2 as able  - Pain/nausea control prn  - Monitor neurovascular exam   - OK to transferred to monitored floor      Eric Hanna MD

## 2022-02-15 NOTE — PROGRESS NOTES
Vascular Surgery Progress Note    CC: f/u EVAR, fem-fem    HISTORY:  The patient is awake, alert, and oriented. Pain controlled. IMPRESSION:  POD # 1 s/p EVAR, fem-fem. PLAN:  [facility] [place] Advance diet.     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    History of ERCP Z98.890    Pancreatic duct stones K86.89    Prostate cancer ([facility] C61    AAA (abdominal aortic aneurysm) without rupture (HCC) I71.4       Current Medications:    sodium chloride      dextrose        sodium chloride flush, sodium chloride, acetaminophen, oxyCODONE, morphine **OR** [DISCONTINUED] morphine, ondansetron, glucose, dextrose, glucagon (rDNA), dextrose    aspirin  81 mg Oral Daily    fenofibrate  160 mg Oral Daily    isosorbide mononitrate  30 mg Oral Daily    losartan  100 mg Oral Daily    metoprolol succinate  50 mg Oral Nightly    atorvastatin  20 mg Oral Nightly    sodium chloride flush  10 mL IntraVENous 2 times per day    insulin lispro  0-6 Units SubCUTAneous TID WC    insulin lispro  0-3 Units SubCUTAneous Nightly    vancomycin  1,000 mg IntraVENous Q24H          PHYSICAL EXAM:    Vitals:    02/15/22 0700   BP: (!) 144/71   Pulse: 63   Resp: 20   Temp:    SpO2: 99%     CONSTITUTIONAL:  awake, alert, cooperative, no apparent distress  LUNGS:  no increased work of breathing, good air exchange and clear to auscultation  CARDIOVASCULAR:  regular rate and rhythm and dorsalis pedis pulses 1+ bilaterally  ABDOMEN:  soft, non-distended and non-tender  Groin dressings intact. No swelling.     LABS:    Lab Results   Component Value Date    WBC 7.6 02/15/2022    HGB 12.5 02/15/2022    HCT 39.2 02/15/2022     02/15/2022    PROTIME 11.3 02/10/2022    INR 1.0 02/10/2022    K 4.1 02/15/2022    BUN 36 (H) 02/15/2022    CREATININE 1.2 02/15/2022       RADIOLOGY:

## 2022-02-15 NOTE — PROGRESS NOTES
Value Ref Range    Meter Glucose 116 (H) 74 - 99 mg/dL   POCT Glucose    Collection Time: 02/14/22  5:28 PM   Result Value Ref Range    Meter Glucose 195 (H) 74 - 99 mg/dL   POCT Glucose    Collection Time: 02/14/22  8:13 PM   Result Value Ref Range    Meter Glucose 164 (H) 74 - 99 mg/dL       Radiology and other tests reviewed:  No orders to display       Assessment/Plan:  [de-identified] y.o hx of AAA and PVD s/p EVAR and fem-fem bypass 2/14  -Advance to normal diet  -Resume home meds  -Ambulate  -D/c garcia and A-line  -Wean O2 as necessary      Cuco Feathers  M3  02/15/22   5:07 AM

## 2022-02-16 VITALS
BODY MASS INDEX: 23.07 KG/M2 | HEART RATE: 80 BPM | DIASTOLIC BLOOD PRESSURE: 68 MMHG | TEMPERATURE: 97.6 F | HEIGHT: 67 IN | WEIGHT: 147 LBS | OXYGEN SATURATION: 95 % | RESPIRATION RATE: 16 BRPM | SYSTOLIC BLOOD PRESSURE: 132 MMHG

## 2022-02-16 LAB
ANION GAP SERPL CALCULATED.3IONS-SCNC: 11 MMOL/L (ref 7–16)
BUN BLDV-MCNC: 28 MG/DL (ref 6–23)
CALCIUM SERPL-MCNC: 7.9 MG/DL (ref 8.6–10.2)
CHLORIDE BLD-SCNC: 108 MMOL/L (ref 98–107)
CO2: 20 MMOL/L (ref 22–29)
CREAT SERPL-MCNC: 1.1 MG/DL (ref 0.7–1.2)
GFR AFRICAN AMERICAN: >60
GFR NON-AFRICAN AMERICAN: >60 ML/MIN/1.73
GLUCOSE BLD-MCNC: 166 MG/DL (ref 74–99)
METER GLUCOSE: 179 MG/DL (ref 74–99)
POTASSIUM SERPL-SCNC: 4.2 MMOL/L (ref 3.5–5)
SODIUM BLD-SCNC: 139 MMOL/L (ref 132–146)

## 2022-02-16 PROCEDURE — 6370000000 HC RX 637 (ALT 250 FOR IP): Performed by: NURSE PRACTITIONER

## 2022-02-16 PROCEDURE — 2580000003 HC RX 258: Performed by: NURSE PRACTITIONER

## 2022-02-16 PROCEDURE — 6360000002 HC RX W HCPCS: Performed by: NURSE PRACTITIONER

## 2022-02-16 PROCEDURE — 82962 GLUCOSE BLOOD TEST: CPT

## 2022-02-16 PROCEDURE — 36415 COLL VENOUS BLD VENIPUNCTURE: CPT

## 2022-02-16 PROCEDURE — 80048 BASIC METABOLIC PNL TOTAL CA: CPT

## 2022-02-16 RX ORDER — OXYCODONE HYDROCHLORIDE 5 MG/1
5 TABLET ORAL EVERY 6 HOURS PRN
Qty: 12 TABLET | Refills: 0 | Status: SHIPPED | OUTPATIENT
Start: 2022-02-16 | End: 2022-02-19

## 2022-02-16 RX ADMIN — LOSARTAN POTASSIUM 100 MG: 50 TABLET, FILM COATED ORAL at 07:51

## 2022-02-16 RX ADMIN — ISOSORBIDE MONONITRATE 30 MG: 30 TABLET, EXTENDED RELEASE ORAL at 07:51

## 2022-02-16 RX ADMIN — ASPIRIN 81 MG: 81 TABLET, COATED ORAL at 07:51

## 2022-02-16 RX ADMIN — OXYCODONE HYDROCHLORIDE 5 MG: 5 TABLET ORAL at 05:00

## 2022-02-16 RX ADMIN — Medication 10 ML: at 07:50

## 2022-02-16 RX ADMIN — VANCOMYCIN HYDROCHLORIDE 1000 MG: 1 INJECTION, POWDER, LYOPHILIZED, FOR SOLUTION INTRAVENOUS at 08:17

## 2022-02-16 RX ADMIN — FENOFIBRATE 160 MG: 160 TABLET ORAL at 07:50

## 2022-02-16 ASSESSMENT — PAIN SCALES - GENERAL
PAINLEVEL_OUTOF10: 0
PAINLEVEL_OUTOF10: 6

## 2022-02-16 NOTE — PROGRESS NOTES
CVICU Progress Note    Name: Fabrice Patel  MRN: [id number]    CC: Postoperative Critical Care Management     Indication for Surgery/Procedure: AAA     Important/Relevant PMH/PSH: HTN, HPL, CAD s/p CABG 2002, Prostate cancer, DM Type 2, pancreatitis, esophageal stricture, CKD      Procedure/Surgeries:2/14/2022 Endovascular abdominal aortic aneurysm repair, Femoral-femoral bypass        Intake/Output Summary (Last 24 hours) at 2/16/2022 0803  Last data filed at 2/16/2022 0600  Gross per 24 hour   Intake 490 ml   Output 1050 ml   Net -560 ml       Recent Labs     02/15/22  0505   WBC 7.6   HGB 12.5   HCT 39.2         Lab Results   Component Value Date     02/16/2022    K 4.2 02/16/2022    K 4.4 02/10/2022     02/16/2022    CO2 20 02/16/2022    BUN 28 02/16/2022    CREATININE 1.1 02/16/2022    GLUCOSE 166 02/16/2022    CALCIUM 7.9 02/16/2022         Physical Exam:    BP (!) 146/83   Pulse 79   Temp 98.8 °F (37.1 °C) (Temporal)   Resp 17   Ht 5' 7\" (1.702 m)   Wt 147 lb (66.7 kg)   SpO2 95%   BMI 23.02 kg/m²         General: Awake, alert.  Sitting up in chair, wants to go home   Eyes: PERRL, anicteric   Pulmonary: No wheezes, no accessory muscle use noted on room air   Cardiovascular:  RRR, no heaves or thrills on palpation  Tele: SR  Abdomen: Soft, nontender, +BS, +flatus    Extremities: Palpable pulses all extremities, no edema   Neurologic/Psych: A&Ox3, VALDES to command   Skin: Warm and dry  Incisions: Bilateral groin aquacel dressings intact       Assessment/Plan: POD #2  1. AAA, PVD S/p EVAR, Fem-fem bypass  - No acute events overnight, labs reviewed, ambulating well, tolerating diet   - PRN oxycodone for pain management   - DM Type 2- SSI for glycemic control while inpatient, resume home oral agents at discharge   - CKD- baseline Scr 1.4, stable at 1.1 this AM, non-oliguric   -[facility]      Dispo: Likely discharge home today     Electronically signed by JUAN CARLOS Berger CNP on 2/16/2022 at 8:03 AM

## 2022-02-16 NOTE — PROGRESS NOTES
Vascular Surgery Progress Note    Pt is being seen in f/u today regarding AAA    Subjective  Pt s/e. POD 1 EVAR, L common iliac artery coiling, fem-fem bypass. Feeling better today, pain medication helping.  Has been OOB ambulating    Current Medications:    sodium chloride      dextrose        sodium chloride flush, sodium chloride, acetaminophen, oxyCODONE, morphine **OR** [DISCONTINUED] morphine, ondansetron, glucose, dextrose, glucagon (rDNA), dextrose    aspirin  81 mg Oral Daily    fenofibrate  160 mg Oral Daily    isosorbide mononitrate  30 mg Oral Daily    losartan  100 mg Oral Daily    metoprolol succinate  50 mg Oral Nightly    atorvastatin  20 mg Oral Nightly    sodium chloride flush  10 mL IntraVENous 2 times per day    insulin lispro  0-6 Units SubCUTAneous TID WC    insulin lispro  0-3 Units SubCUTAneous Nightly    vancomycin  1,000 mg IntraVENous Q24H        PHYSICAL EXAM:    BP (!) 142/81   Pulse 89   Temp 98.8 °F (37.1 °C) (Temporal)   Resp 21   Ht 5' 7\" (1.702 m)   Wt 147 lb (66.7 kg)   SpO2 95%   BMI 23.02 kg/m²     Intake/Output Summary (Last 24 hours) at 2/16/2022 0845  Last data filed at 2/16/2022 0600  Gross per 24 hour   Intake 240 ml   Output 1050 ml   Net -810 ml          Gen: awake, alert and oriented x3, no apparent distress  CVS: RRR  Resp: No increased work of breathing on room air  Abd: Soft, mildly tender suprapubic, non-distended  R LE: R groin w/ aquacell dressing, soft, appropriately tender, no hematoma DP 1+  L LE: L groin w/ aquacell dressing, soft, appropriately tender, no hematoma DP 1+    LABS:    Lab Results   Component Value Date    WBC 7.6 02/15/2022    HGB 12.5 02/15/2022    HCT 39.2 02/15/2022     02/15/2022    PROTIME 11.3 02/10/2022    INR 1.0 02/10/2022    K 4.2 02/16/2022    BUN 28 (H) 02/16/2022    CREATININE 1.1 02/16/2022       A/P  [de-identified] y.o. male with AAA s/p EVAR, L common iliac artery embolization, fem-fem bypass 2/14.    - Cardiac diet  - OOB to chair  - Pain/nausea control prn  - Discharge home      Riky Littlejohn MD

## 2022-02-16 NOTE — CARE COORDINATION
Pod#2. Pt states he feels well and is hoping to discharge later. Tolerating diet. He states he has ambulated. Dc plan is home with [facility]. They are following. Family will transport him home.

## 2022-02-16 NOTE — PROGRESS NOTES
Vascular Surgery Progress Note    CC: f/u EVAR, fem-fem    HISTORY:  The patient is awake, alert, and oriented. Pain controlled. States he has been up walking around unit without issues and would like to go home today. IMPRESSION:  POD # 2 s/p EVAR, fem-fem. PLAN: Dc home. FU 2 weeks.     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    History of ERCP Z98.890    Pancreatic duct stones K86.89    Prostate cancer ([facility] [address] C61    AAA (abdominal aortic aneurysm) without rupture (HCC) I71.4       Current Medications:    sodium chloride      dextrose        sodium chloride flush, sodium chloride, acetaminophen, oxyCODONE, morphine **OR** [DISCONTINUED] morphine, ondansetron, glucose, dextrose, glucagon (rDNA), dextrose    aspirin  81 mg Oral Daily    fenofibrate  160 mg Oral Daily    isosorbide mononitrate  30 mg Oral Daily    losartan  100 mg Oral Daily    metoprolol succinate  50 mg Oral Nightly    atorvastatin  20 mg Oral Nightly    sodium chloride flush  10 mL IntraVENous 2 times per day    insulin lispro  0-6 Units SubCUTAneous TID WC    insulin lispro  0-3 Units SubCUTAneous Nightly    vancomycin  1,000 mg IntraVENous Q24H          PHYSICAL EXAM:    Vitals:    02/16/22 0800   BP: (!) 142/81   Pulse: 89   Resp: 21   Temp:    SpO2: 95%     CONSTITUTIONAL:  awake, alert, cooperative, no apparent distress  LUNGS:  no increased work of breathing, good air exchange and clear to auscultation  CARDIOVASCULAR:  regular rate and rhythm and dorsalis pedis pulses 2+ bilaterally  ABDOMEN:  soft, non-distended and non-tender  Groin dressings intact. No swelling.     LABS:    Lab Results   Component Value Date    WBC 7.6 02/15/2022    HGB 12.5 02/15/2022    HCT 39.2 02/15/2022     02/15/2022    PROTIME 11.3 02/10/2022    INR 1.0 02/10/2022    K 4.2 02/16/2022    BUN 28 (H) 02/16/2022    CREATININE 1.1 02/16/2022       RADIOLOGY:

## 2022-02-16 NOTE — PROGRESS NOTES
Chief complaint :  [de-identified] y.o hx of AAA and PVD s/p EVAR and fem-fem bypass 2/14    Subjective:  No acute overnight events. One episode of /87, did not required diltiazem. Patient is resting comfortably and describes feeling improved from yesterday. Requesting to shower. Minimal pain to incision sites overnight. Some tenderness this morning, improved with Tylenol use. Tolerating diet. Mitchell removed, . Ambulated yesterday. Still denies any BM. Past medical, surgical, family and social history were reviewed, non-contributory, and unchanged unless otherwise stated.       Objective:  BP (!) 152/83   Pulse 83   Temp 98.8 °F (37.1 °C) (Temporal)   Resp 19   Ht 5' 7\" (1.702 m)   Wt 147 lb (66.7 kg)   SpO2 93%   BMI 23.02 kg/m²     General: awake, alert and oriented x3, no apparent distress  Cardiovascular: RRR  Pulmonary: No increased work of breathing on room air  Abdomen: Soft, mildly tender suprapubic, non-distended  R LE: R groin w/ aquacell dressing, soft, appropriately tender, no hematoma DP 1+  L LE: L groin w/ aquacell dressing, soft, appropriately tender, no hematoma DP 1+  Skin: Warm and dry    Labs:  Recent Results (from the past 24 hour(s))   Basic Metabolic Panel    Collection Time: 02/15/22  5:05 AM   Result Value Ref Range    Sodium 143 132 - 146 mmol/L    Potassium 4.1 3.5 - 5.0 mmol/L    Chloride 113 (H) 98 - 107 mmol/L    CO2 19 (L) 22 - 29 mmol/L    Anion Gap 11 7 - 16 mmol/L    Glucose 153 (H) 74 - 99 mg/dL    BUN 36 (H) 6 - 23 mg/dL    CREATININE 1.2 0.7 - 1.2 mg/dL    GFR Non-African American 58 >=60 mL/min/1.73    GFR African American >60     Calcium 7.5 (L) 8.6 - 10.2 mg/dL   CBC    Collection Time: 02/15/22  5:05 AM   Result Value Ref Range    WBC 7.6 4.5 - 11.5 E9/L    RBC 4.01 3.80 - 5.80 E12/L    Hemoglobin 12.5 12.5 - 16.5 g/dL    Hematocrit 39.2 37.0 - 54.0 %    MCV 97.8 80.0 - 99.9 fL    MCH 31.2 26.0 - 35.0 pg    MCHC 31.9 (L) 32.0 - 34.5 %    RDW 14.8 11.5 - 15.0 fL Platelets 508 329 - 606 E9/L    MPV 10.1 7.0 - 12.0 fL   POCT Glucose    Collection Time: 02/15/22  7:40 AM   Result Value Ref Range    Meter Glucose 146 (H) 74 - 99 mg/dL   POCT Glucose    Collection Time: 02/15/22 11:39 AM   Result Value Ref Range    Meter Glucose 175 (H) 74 - 99 mg/dL   POCT Glucose    Collection Time: 02/15/22  3:53 PM   Result Value Ref Range    Meter Glucose 137 (H) 74 - 99 mg/dL   POCT Glucose    Collection Time: 02/15/22  8:49 PM   Result Value Ref Range    Meter Glucose 175 (H) 74 - 99 mg/dL       Radiology and other tests reviewed:  No orders to display       Assessment/Plan:  [de-identified] y.o. male with AAA s/p EVAR, L common iliac artery embolization, fem-fem bypass 2/14.     -OOB to chair  -Pain/nausea control prn  -OK to d/c    Karena BAILEY  02/16/22   5:01 AM

## 2022-02-16 NOTE — PLAN OF CARE
Problem: Falls - Risk of:  Goal: Will remain free from falls  Description: Will remain free from falls  Outcome: Met This Shift  Goal: Absence of physical injury  Description: Absence of physical injury  Outcome: Met This Shift     Problem: Skin Integrity:  Goal: Will show no infection signs and symptoms  Description: Will show no infection signs and symptoms  Outcome: Met This Shift  Goal: Absence of new skin breakdown  Description: Absence of new skin breakdown  Outcome: Met This Shift     Problem: Discharge Planning:  Goal: Participates in care planning  Description: Participates in care planning  Outcome: Met This Shift  Goal: Discharged to appropriate level of care  Description: Discharged to appropriate level of care  Outcome: Met This Shift     Problem: Airway Clearance - Ineffective:  Goal: Ability to maintain a clear airway will improve  Description: Ability to maintain a clear airway will improve  Outcome: Met This Shift     Problem: Anxiety/Stress:  Goal: Level of anxiety will decrease  Description: Level of anxiety will decrease  Outcome: Met This Shift     Problem: Aspiration:  Goal: Absence of aspiration  Description: Absence of aspiration  Outcome: Met This Shift     Problem:  Bowel Function - Altered:  Goal: Bowel elimination is within specified parameters  Description: Bowel elimination is within specified parameters  Outcome: Met This Shift     Problem: Cardiac Output - Decreased:  Goal: Hemodynamic stability will improve  Description: Hemodynamic stability will improve  Outcome: Met This Shift     Problem: Fluid Volume - Imbalance:  Goal: Absence of imbalanced fluid volume signs and symptoms  Description: Absence of imbalanced fluid volume signs and symptoms  Outcome: Met This Shift

## 2022-02-17 ENCOUNTER — CARE COORDINATION (OUTPATIENT)
Dept: CASE MANAGEMENT | Age: 82
End: 2022-02-17

## 2022-02-17 NOTE — CARE COORDINATION
[facility] Transitions Initial Follow Up Call    Call within 2 business days of discharge: Yes    Patient: Joi White Patient :    MRN: [id number]  Reason for Admission: AAA w/o rupture  Discharge Date: 22 RARS: Readmission Risk Score: 11.3 ( )      Last Discharge [facility]       Complaint Diagnosis Description Type Department Provider    22  Preop cardiovascular exam ... Admission (Discharged) [facility] CV Emilee Nunez MD        Attempted to contact patient today 22 for TCM/hospital discharge follow up. Unable to make contact with patient on either home/mobile number. Home number keeps ringing with no answer and voice mailbox not set up on mobile number. CTN spoke with [facility] confirming [facility] visit scheduled for today 22. CTN will continue with outreach.       JUAN CARLOS Quinteros

## 2022-02-18 ENCOUNTER — CARE COORDINATION (OUTPATIENT)
Dept: CASE MANAGEMENT | Age: 82
End: 2022-02-18

## 2022-02-18 DIAGNOSIS — I71.40 AAA (ABDOMINAL AORTIC ANEURYSM) WITHOUT RUPTURE: Primary | ICD-10-CM

## 2022-02-18 PROCEDURE — 1111F DSCHRG MED/CURRENT MED MERGE: CPT | Performed by: PHYSICIAN ASSISTANT

## 2022-02-18 NOTE — DISCHARGE SUMMARY
Vascular Surgery Discharge Summary    [facility]:                The patient is a [de-identified] y.o. male who was admitted to the hospital on 2/14/2022  9:08 AM for treatment of abdominal aortic aneursym. On the day of admission, endovascular abdominal aortic aneurysm repair and femoral-femoral bypass was performed. The patient's hospital course was uncomplicated and consisted of physical therapy, incision observation, and a return to normal oral intake. The patient was discharged on 2/16/2022 11:50 AM tolerating a diet, and urinating without difficulty. The incisions were clean and intact. The patient was discharged home in satisfactory condition with instructions to call the office for a follow up appointment. Hospital Problem List:  Active Problems:    AAA (abdominal aortic aneurysm) without rupture ([address]  Resolved Problems:    * No resolved hospital problems. *     Procedure(s) (LRB):  ENDOVASCULAR REPAIR OF ABDOMINAL AORTIC ANEURYSM, WITH A FEMORAL TO FEMORAL BYPASS (N/A)    Discharge Medications:      Medication List      START taking these medications    oxyCODONE 5 MG immediate release tablet  Commonly known as: ROXICODONE  Take 1 tablet by mouth every 6 hours as needed for Pain for up to 3 days. CHANGE how you take these medications    lipase-protease-amylase 75888-13871 units delayed release capsule  Commonly known as: Creon  Take 1 capsule by mouth 3 times daily (with meals)  What changed:   · when to take this  · reasons to take this     metoprolol succinate 50 MG extended release tablet  Commonly known as: TOPROL XL  TAKE 1 TABLET EVERY DAY  What changed: See the new instructions.         CONTINUE taking these medications    aspirin 81 MG EC tablet     CENTRUM SILVER 50+MEN PO     fenofibrate micronized 134 MG capsule  Commonly known as: LOFIBRA  TAKE 1 CAPSULE EVERY DAY     FreeStyle Lancets Misc  USE DAILY     FREESTYLE LITE strip  Generic drug: blood glucose test strips  1 each by Other route daily As needed.      glimepiride 2 MG tablet  Commonly known as: AMARYL  TAKE 1 TABLET EVERY MORNING     isosorbide mononitrate 30 MG extended release tablet  Commonly known as: IMDUR  TAKE 1 TABLET EVERY DAY     Jardiance 10 MG tablet  Generic drug: empagliflozin  TAKE 1 TABLET EVERY DAY     losartan 100 MG tablet  Commonly known as: COZAAR  TAKE 1 TABLET EVERY MORNING     metFORMIN 1000 MG tablet  Commonly known as: GLUCOPHAGE  TAKE 1 TABLET TWICE DAILY WITH MEALS     simvastatin 40 MG tablet  Commonly known as: ZOCOR  TAKE 1 TABLET EVERY NIGHT           Where to Get Your Medications      You can get these medications from any pharmacy    Bring a paper prescription for each of these medications  · oxyCODONE 5 MG immediate release tablet         Pallavi Thacker, JUAN CARLOS - CNP  2/18/2022

## 2022-02-18 NOTE — CARE COORDINATION
[facility] Transitions Initial Follow Up Call    Call within 2 business days of discharge: Yes    Patient: Ethan Joe Patient : [id number]   MRN: [id number]  Reason for Admission: AAA w/o rupture and s/p EVAR  Discharge Date: 22 RARS: Readmission Risk Score: 11.3 ( )      Last Discharge [facility]       Complaint Diagnosis Description Type Department Provider    22  Preop cardiovascular exam ... Admission (Discharged) [facility] CV Homero Mercado MD        Transitions of Care Initial Call    Was this an external facility discharge? No Discharge Facility: N/A    Challenges to be reviewed by the provider   Additional needs identified to be addressed with provider: No  none             Method of communication with provider : none      Advance Care Planning:   Does patient have an Advance Directive: reviewed and current. Was this a readmission? No  Patient stated reason for admission: aneurysm in belly  Patients top risk factors for readmission: lack of knowledge about disease  medical condition-DM and prostate cancer  polypharmacy    Care Transition Nurse (CTN) contacted the patient by telephone to perform post hospital discharge assessment. Verified name and  with patient as identifiers. Provided introduction to self, and explanation of the CTN role. CTN reviewed discharge instructions, medical action plan and red flags with patient who verbalized understanding. Patient given an opportunity to ask questions and does not have any further questions or concerns at this time. Were discharge instructions available to patient? Yes. Reviewed appropriate site of care based on symptoms and resources available to patient including: PCP, Specialist, Urgent care clinics, Home health, When to call 911 and Condition related references. The patient agrees to contact the PCP office for questions related to their healthcare.      Medication reconciliation was performed with patient, who verbalizes understanding of administration of home medications. Advised obtaining a 90-day supply of all daily and as-needed medications. Covid Risk Education     Educated patient about risk for severe COVID-19 due to risk factors according to CDC guidelines. CTN reviewed discharge instructions, medical action plan and red flag symptoms with the patient who verbalized understanding. Discussed COVID vaccination status: Yes. Education provided on COVID-19 vaccination as appropriate. Discussed exposure protocols and quarantine with CDC Guidelines. Patient was given an opportunity to verbalize any questions and concerns and agrees to contact CTN or health care provider for questions related to their healthcare. Reviewed and educated patient on any new and changed medications related to discharge diagnosis. Was patient discharged with a pulse oximeter? No Discussed and confirmed pulse oximeter discharge instructions and when to notify provider or seek emergency care. CTN provided contact information. Plan for follow-up call in 5-7 days based on severity of symptoms and risk factors. Non-face-to-face services provided:  Scheduled appointment with PCP-CTN confirmed with patient he will call PCP and schedule follow up declining CTN assistance. Scheduled appointment with Specialist-CTN confirmed with patient he is scheduled for post op f/u with Dr. Nicolas Sands (vasc surg) on 3/8/22 at 1:15 pm.   Obtained and reviewed discharge summary and/or continuity of care documents  Education of patient/family/caregiver/guardian to support self-management-Discussed DM zone tool and s/s of infection knowing when to seek medical attention. Assessment and support for treatment adherence and medication management-Advised Oxycodone can lead to constipation and call dotor if no BM in 3 days. Establishment or re-establishment of referrals-CTN confirmed with patient [facility] had [facility] yesterday and had nurse visit today.      Care Transitions 24 Hour Call    Do you have any ongoing symptoms?: No  Do you have a copy of your discharge instructions?: Yes  Do you have all of your prescriptions and are they filled?: Yes  Have you been contacted by a [address]?: No  Have you scheduled your follow up appointment?: Yes  How are you going to get to your appointment?: Car - drive self  Were you discharged with any Home Care or Post Acute Services: Yes  Post Acute Services: Home Health (Comment: [address] [place] [address] 2/17/22)  Do you feel like you have everything you need to keep you well at home?: Yes  Care Transitions Interventions  No Identified Needs       Spoke with patient today 2/18/22 for TCM/hospital discharge follow up for AAA w/o rupture and s/p EVAR with femoral to femoral bypass per Dr. Viral Nieto (Vasular Surg). Patient states he is doing better since discharge and offers no complaints. Denies any shortness of breath, chest pain, abdominal pain, post op pain, nausea, vomiting, chills or fever. States post op bandages were removed yesterday and incisions are dry and open to air. States had nurse visit today from [address] [place] [address] who looked at incisions saying \"they look good\". Discussed s/s of infection and knowing when to call doctor or seek medical attention. Patient states he is passing gas but has not had BM. Advised Oxycodone can cause constipation and to call doctor if no BM in 3 days. Patient states he is ambulating independently and feels tired from being in hospital and going thru surgery but has no issues. Provided a complete review of home meds with patient. 1111F code entered. Patient states he monitors BG once a day but no consistent and has not checked BG since discharge but will try to get back on track. Discussed DM zone tool. Confirmed with patient he is scheduled for post op f/u appt with Dr. Linda Bennett on 3/8/22. States he will all PCP to schedule HFU appt.  States DTR will be coming in from [facility] to stay with him this weekend and has two sons Melissa Ye and Ammon ayala) who are staying with him now and providing assistance. States Jasmine stays during night and John during the day. Denies any needs or concerns at this time. CTN will continue to follow for Care Transition.      Follow Up  Future Appointments   Date Time Provider Tami Casas   3/8/2022  1:15 PM Leanna Ross MD [facility]       JUAN CARLOS Echeverria

## 2022-02-21 DIAGNOSIS — I10 ESSENTIAL HYPERTENSION: ICD-10-CM

## 2022-02-21 DIAGNOSIS — I25.119 CORONARY ARTERY DISEASE INVOLVING NATIVE CORONARY ARTERY OF NATIVE HEART WITH ANGINA PECTORIS (HCC): Chronic | ICD-10-CM

## 2022-02-21 DIAGNOSIS — I10 HYPERTENSION, UNSPECIFIED TYPE: ICD-10-CM

## 2022-02-21 RX ORDER — METOPROLOL SUCCINATE 50 MG/1
50 TABLET, EXTENDED RELEASE ORAL NIGHTLY
Qty: 90 TABLET | Refills: 1 | Status: SHIPPED | OUTPATIENT
Start: 2022-02-21 | End: 2022-07-18 | Stop reason: SDUPTHER

## 2022-02-21 RX ORDER — LOSARTAN POTASSIUM 100 MG/1
TABLET ORAL
Qty: 90 TABLET | Refills: 0 | Status: SHIPPED
Start: 2022-02-21 | End: 2022-05-03

## 2022-02-25 ENCOUNTER — CARE COORDINATION (OUTPATIENT)
Dept: CASE MANAGEMENT | Age: 82
End: 2022-02-25

## 2022-02-25 LAB
BLOOD BANK DISPENSE STATUS: NORMAL
BLOOD BANK PRODUCT CODE: NORMAL
BPU ID: NORMAL
DESCRIPTION BLOOD BANK: NORMAL

## 2022-02-25 NOTE — CARE COORDINATION
[facility] Transitions Follow Up Call    2022    Patient: Roz Gibson  Patient : [id number]   MRN: [id number]  Reason for Admission:   Disch AAA w/o rupture  Discharge Date: 22 RARS: Readmission Risk Score: 11.3 ( )    Care Transitions Follow Up Call    Needs to be reviewed by the provider   Additional needs identified to be addressed with provider: No  none             Method of communication with provider : none      Care Transition Nurse (CTN) contacted the patient by telephone to follow up after admission on 22. Verified name and  with patient as identifiers. Addressed changes since last contact: none  Discussed follow-up appointments. If no appointment was previously scheduled, appointment scheduling offered: Yes. Is follow up appointment scheduled within 7 days of discharge? No and Patient states he wabts to wait until following up with Dr. Yuki Dolan (Vascualr Surg) on 3/8/22 and with radiation oncologist before seeing PCP. Advance Care Planning:   Does patient have an Advance Directive: reviewed and current. CTN reviewed discharge instructions, medical action plan and red flags with patient and discussed any barriers to care and/or understanding of plan of care after discharge. Discussed appropriate site of care based on symptoms and resources available to patient including: PCP, Specialist, Urgent care clinics, Home health, When to call 911 and Condition related references. The patient agrees to contact the PCP office for questions related to their healthcare. Patients top risk factors for readmission: lack of knowledge about disease  level of motivation  medical condition-DM and prostate cancer  polypharmacy      Plan for follow-up call in 5-7 days based on severity of symptoms and risk factors.     Care Transitions Subsequent and Final Call    Subsequent and Final Calls  Do you have any ongoing symptoms?: No  Have your medications changed?: No  Do you have any questions related to your medications?: No  Do you currently have any active services?: Yes  Are you currently active with any services?: Home Health  Do you have any needs or concerns that I can assist you with?: No  Identified Barriers: None  Care Transitions Interventions  No Identified Needs  Other Interventions:         Spoeke with patient today 2/25/22 for TCM/hospital discharge follow up sub call. Patient states he continues doing well since his s/p EVAR per Dr. Eagle Russo (vascular surg). Patient denies any complaints of shortness of breath, chest pain, abdominal pain, nausea, vomiting, diarrhea, constipation, chills or fever. Patient stats incisions are dry, open to air and healing. States he has danyel taking sponge baths but intends on trying a shower today as he is getting stronger. Patient states he continues working with [facility]. States he is ambulating independently and has been trying to take walks outside for exercise. States monitoring BG once a day and admits FBS was 109 this am. Noted last Hgb A1C on record was 7.5 on 8/23/21. Reiterated DM zone tool. Confirmed with patient he is scheduled to follow up with Bhupinder Means on 3/8/22. Patient states he wants to wait until he sees Dr. Eagle Russo and radiation oncologist before scheduling with PCP. Patient states prior to surgery he was to begin radiation therapy for prostate cancer which is on hold d/t having AAA surgery. States he has had the hormone injection and will follow up with radiation oncologist. Denies any difficulty urinating or starting/stopping stream. States having frequent urination which is not new. Denies any needs or concerns at this time. CTN will continue to follow for Care Transition. Follow Up  Future Appointments   Date Time Provider Tami Casas   3/8/2022  1:15 PM Jerson Campoverde MD [facility]     CTN provided contact information for future needs.     JUAN CARLOS Ruff

## 2022-02-28 ENCOUNTER — HOSPITAL ENCOUNTER (OUTPATIENT)
Dept: CARDIAC REHAB | Age: 82
Discharge: HOME | End: 2022-02-28

## 2022-03-03 RX ORDER — LANCETS 28 GAUGE
EACH MISCELLANEOUS
Qty: 100 EACH | Refills: 0 | Status: SHIPPED | OUTPATIENT
Start: 2022-03-03

## 2022-03-04 ENCOUNTER — CARE COORDINATION (OUTPATIENT)
Dept: CASE MANAGEMENT | Age: 82
End: 2022-03-04

## 2022-03-04 NOTE — CARE COORDINATION
Bo 45 Transitions Follow Up Call    3/4/2022    Patient: Shirley Barrientos  Patient : 1940   MRN: [id number]  Reason for Admission: AAA w/o rupture and s/p endovascular of aortic aneurysm with femoral to femoral bypass. Discharge Date: 22 RARS: Readmission Risk Score: 11.3 ( )    Care Transitions Follow Up Call    Needs to be reviewed by the provider   Additional needs identified to be addressed with provider: No  none             Method of communication with provider : none      Care Transition Nurse (CTN) contacted the patient by telephone to follow up after admission on 22. Verified name and  with patient as identifiers. Addressed changes since last contact: none  Discussed follow-up appointments. If no appointment was previously scheduled, appointment scheduling offered: Yes. Is follow up appointment scheduled within 7 days of discharge? No.    Advance Care Planning:   Does patient have an Advance Directive: reviewed and current. CTN reviewed discharge instructions, medical action plan and red flags with patient and discussed any barriers to care and/or understanding of plan of care after discharge. Discussed appropriate site of care based on symptoms and resources available to patient including: PCP, Specialist, Urgent care clinics, When to call 911 and Condition related references. The patient agrees to contact the PCP office for questions related to their healthcare. Patients top risk factors for readmission: functional physical ability  level of motivation  medical condition-Prostate cancer and DM  polypharmacy    Non-[facility] follow up appointment(s): CTN confirmed with patient he is scheduled to follow up with Dr. Modesto Sheridan (vascular surg) on 3/8/22 for post op appt      Plan for follow-up call in 7-10 days based on severity of symptoms and risk factors.   Plan for next call: symptom management-HAs weakness/fatigue improved?  follow up appointment-Did patient get scheduled for HFU appt with PCP? Care Transitions Subsequent and Final Call    Subsequent and Final Calls  Do you have any ongoing symptoms?: Yes  Onset of Patient-reported symptoms: Other  Patient-reported symptoms: Weakness, Fatigue  Have your medications changed?: No  Do you have any questions related to your medications?: No  Do you currently have any active services?: Yes  Are you currently active with any services?: Home Health  Do you have any needs or concerns that I can assist you with?: No  Identified Barriers: None  Care Transitions Interventions  No Identified Needs  Other Interventions:         Spoke with patient today 3/4/22 for TCM/hospital discharge follow up sub call. Patient complains of leg weakness and fatigue verbalizing he tires easily when walking. States he has worked up to walking 2.5 blocks. [facility] discharge him on 3/1/22. Patient denies any shortness of breath, chest pain chest discomfort, abdominal pain, nausea, vomiting, chills or fever. Patient states incisions to bilateral upper thighs are dry and healing. Patient states he is tolerating taking showers. Patient states FBS today was 89 as he monitors BG once a day. Reitearted DM zone tool and s/s of infection knowing when to call doctor. Confirmed with patient he is scheduled to follow up with Dr. Dasha Hidalgo (vascualr surg) on 3/8/22. States he will discuss with Dr. Guillermo Borja to see if he is able to continue with OP cardio rehab which he has been attending since 2002 after CABG. Denies any other complaints or concerns at this time. CTN will continue to follow.      Follow Up  Future Appointments   Date Time Provider Tami Casas   3/8/2022  1:15 PM Sharie Castleman, MD [facility]       JUAN CARLOS [place]

## 2022-03-07 ENCOUNTER — TELEPHONE (OUTPATIENT)
Dept: VASCULAR SURGERY | Age: 82
End: 2022-03-07

## 2022-03-08 ENCOUNTER — TELEPHONE (OUTPATIENT)
Dept: VASCULAR SURGERY | Age: 82
End: 2022-03-08

## 2022-03-08 ENCOUNTER — OFFICE VISIT (OUTPATIENT)
Dept: VASCULAR SURGERY | Age: 82
End: 2022-03-08

## 2022-03-08 DIAGNOSIS — Z95.828 HISTORY OF ENDOVASCULAR STENT GRAFT FOR ABDOMINAL AORTIC ANEURYSM: Primary | ICD-10-CM

## 2022-03-08 PROCEDURE — 99024 POSTOP FOLLOW-UP VISIT: CPT | Performed by: NURSE PRACTITIONER

## 2022-03-08 NOTE — PROGRESS NOTES
3/8/2022    Patriot Oas  1940    Chief Complaint   Patient presents with    Post-Op Check     s/p EVAR, fem-fem       Patient returns for post operative evaluation status post endovascular repair of abdominal aortic aneurysm and femoral-femoral bypass. The patient denies any unexpected problems since hospital discharge. He fatigues easily, but states he feels a little better each day. He is due to start radiation treatments in the near future. Procedure Laterality Date    ABDOMINAL AORTIC ANEURYSM REPAIR, ENDOVASCULAR N/A 2/14/2022    ENDOVASCULAR REPAIR OF ABDOMINAL AORTIC ANEURYSM, WITH A FEMORAL TO FEMORAL BYPASS performed by Marisa Owen MD at [address] [place] [address]  2002   [address] CATH LAB PROCEDURE  2002    ERCP      HERNIA REPAIR  2003    LITHOTRIPSY      OTHER SURGICAL HISTORY      pancreatic stent x2    TX LAP,CHOLECYSTECTOMY N/A 9/6/2018    CHOLECYSTECTOMY LAPAROSCOPIC ROBOTIC performed by Matthew Paulino MD at [facility] OR       Physical Exam:  The incisions are healing without evidence of infection. Heart rhythm is regular. Right      Left   Brachial     Radial     Femoral     Popliteal     Dorsalis Pedis 2 2   Posterior Tibial 2 2   (3=normal, 2=diminished, 1=barely palpable, 4=widened)    Assessment:  Post-operative endovascular repair abdominal aortic aneurysm, fem-fem bypass. Problem List Items Addressed This Visit     None      Visit Diagnoses     History of endovascular stent graft for abdominal aortic aneurysm    -  Primary    Relevant Orders    CTA ABDOMEN PELVIS W CONTRAST          I reviewed with the patient that normal activities can be resumed as tolerated. He is cleared to start radiation therapy from vascular surgery standpoint. Plan: Return in 6 months for follow-up office visit. Imaging study (CT scan / ultrasound) in 1 month. Pt seen and plan reviewed with Dr. Yuki Dolan.      JUAN CARLOS Nugent - CNP

## 2022-03-08 NOTE — TELEPHONE ENCOUNTER
Left message regarding CTA at [facility] on Wed, 3-23-22 at 1:30 pm.  [place] at 1:00 pm.  NPO after 9:30 am.

## 2022-03-10 ENCOUNTER — CARE COORDINATION (OUTPATIENT)
Dept: CASE MANAGEMENT | Age: 82
End: 2022-03-10

## 2022-03-10 NOTE — CARE COORDINATION
Bo 45 Transitions Follow Up Call    3/10/2022    Patient: Tali Somers  Patient : 1940   MRN: [id number]  Reason for Admission:  AAA w/o rupture and s/p endovascular of aortic aneurysm with femoral to femoral bypass. Discharge Date: 22 RARS: Readmission Risk Score: 11.3 ( )    Care Transitions Follow Up Call    Needs to be reviewed by the provider   Additional needs identified to be addressed with provider: No  none             Method of communication with provider : none    Care Transition Nurse (CTN) contacted the patient by telephone to follow up after admission on 22. Verified name and  with patient as identifiers. Addressed changes since last contact: none  Discussed follow-up appointments. If no appointment was previously scheduled, appointment scheduling offered: Yes. Is follow up appointment scheduled within 7 days of discharge? No.    Advance Care Planning:   Does patient have an Advance Directive: reviewed and current. CTN reviewed discharge instructions, medical action plan and red flags with patient and discussed any barriers to care and/or understanding of plan of care after discharge. Discussed appropriate site of care based on symptoms and resources available to patient including: PCP, Specialist, Urgent care clinics, When to call 911 and Condition related references. The patient agrees to contact the PCP office for questions related to their healthcare. Patients top risk factors for readmission: lack of knowledge about disease  medical condition-Prostate cancer and DM  polypharmacy    Non-[facility] follow up appointment(s): CTN confirmed with patient he completed post op follow up with Dr. Royce Raymond (vascular surgery) on 3/8/22. Plan for follow-up call in 7-10 days based on severity of symptoms and risk factors. Plan for next call: symptom management-Has leg weakness improved?  follow up appointment-Did pateint schedule PCP follow up?      Care Transitions Subsequent and Final Call    Subsequent and Final Calls  Do you have any ongoing symptoms?: Yes  Onset of Patient-reported symptoms: Other  Patient-reported symptoms: Weakness  Have your medications changed?: No  Do you have any questions related to your medications?: No  Do you currently have any active services?: Yes  Are you currently active with any services?: Home Health  Do you have any needs or concerns that I can assist you with?: No  Identified Barriers: None  Care Transitions Interventions  No Identified Needs  Other Interventions:         Spoke with patient today 3/10/22 for TCM/hospital discharge follow up sub call for s/p DIO. Patient states he continues with leg weakness which is improving and feels he's getting stronger. Patient states he is ambulating independently and trying to increase his activity and walking distance as tolerated. Confirmed with patient he completed post op visit with Dr. Adalgisa Ramsey (vascualr surg) earlier this week on 3/8/22. Patient states he is scheduled for a repeat CT of abdomen/pelvis on 3/23/22. Denies any shortness of breath, chest pain, chest discomfort, abdominal pain, post op pain, nausea, vomiting, diarrhea, constipation, chills or fever. Patient states FBS today was 87. Reiterated DM zone tool. Confirmed with patient he will call and schedule f/u with PCP and declines needing CTN assistance. Denies any other complaints or concerns at this time. CTN will continue to follow for Care Transition. Follow Up  Future Appointments   Date Time Provider Tami Casas   3/23/2022  1:30 PM SEB CT2 BD SEBZ CT [facility] Radiolog   9/13/2022  1:30 PM Suyapa Lazaro MD [facility]     CTN provided contact information for future needs.     JUAN CARLOS Jain

## 2022-03-16 ENCOUNTER — CARE COORDINATION (OUTPATIENT)
Dept: CASE MANAGEMENT | Age: 82
End: 2022-03-16

## 2022-03-16 NOTE — CARE COORDINATION
Bo 45 Transitions Follow Up Call    3/16/2022    Patient: Radha Castro  Patient : 1940   MRN: [id number]  Reason for Admission: AAA w/o rupture and s/p EVAR  Discharge Date: 22 RARS: Readmission Risk Score: 11.3 ( )    Care Transitions Follow Up Call    Needs to be reviewed by the provider   Additional needs identified to be addressed with provider: No  none             Method of communication with provider : none      Care Transition Nurse (CTN) contacted the patient by telephone to follow up after admission on 22. Verified name and  with patient as identifiers. Addressed changes since last contact: none  Discussed follow-up appointments. If no appointment was previously scheduled, appointment scheduling offered: Yes. Is follow up appointment scheduled within 7 days of discharge? No.    Advance Care Planning:   Does patient have an Advance Directive: reviewed and current. CTN reviewed discharge instructions, medical action plan and red flags with patient and discussed any barriers to care and/or understanding of plan of care after discharge. Discussed appropriate site of care based on symptoms and resources available to patient including: PCP, Specialist, Urgent care clinics and When to call 911. The patient agrees to contact the PCP office for questions related to their healthcare. Patients top risk factors for readmission: functional physical ability  medical condition-History of prostate cancer and DM  polypharmacy    No further follow-up call indicated based on severity of symptoms and risk factors.     Care Transitions Subsequent and Final Call    Subsequent and Final Calls  Do you have any ongoing symptoms?: Yes  Onset of Patient-reported symptoms: Other  Patient-reported symptoms: Weakness  Have your medications changed?: No  Do you have any questions related to your medications?: No  Do you currently have any active services?: Yes  Are you currently active with any services?: Home Health  Do you have any needs or concerns that I can assist you with?: No  Identified Barriers: None  Care Transitions Interventions  No Identified Needs  Other Interventions:         Spoke with patient today 3/16/22 for final TCM/hospital discharge follow up for s/p EVAR. Patient states he continues doing well and advises his weakness in legs is improving. States he has increased walking from 2 blocks to 3 blocks but advises he tires easily. Denies any shortness of breath, chest pain, chest discomfort, abdominal pain, calf pain, nausea, vomiting, chills or fever. Patient states incisions in both legs are dry and healing. States stitches in both legs are almost dissolved. Confirmed with patient [facility] discharged him from services and he is scheduled to follow up with PCP on 4/22/22. Patient denies any other complaints or further needs. Patient is in agreement to final call. CTN signing off for Care Transition. Follow Up  Future Appointments   Date Time Provider Tami Casas   3/23/2022  1:30 PM SEB CT2 BD SEBZ CT SEB Radiolog   4/22/2022  2:30 PM Randi Farley [facility]   9/13/2022  1:30 PM Devyn Maradiaga MD [facility]     CTN provided contact information for future needs.      JUAN CARLOS Lama

## 2022-03-17 RX ORDER — FENOFIBRATE 134 MG/1
CAPSULE ORAL
Qty: 90 CAPSULE | Refills: 0 | Status: SHIPPED
Start: 2022-03-17 | End: 2022-05-20

## 2022-03-17 RX ORDER — ISOSORBIDE MONONITRATE 30 MG/1
TABLET, EXTENDED RELEASE ORAL
Qty: 90 TABLET | Refills: 0 | Status: SHIPPED
Start: 2022-03-17 | End: 2022-05-20

## 2022-03-23 ENCOUNTER — HOSPITAL ENCOUNTER (OUTPATIENT)
Dept: CT IMAGING | Age: 82
Discharge: HOME | End: 2022-03-25
Payer: MEDICARE

## 2022-03-23 DIAGNOSIS — Z95.828 HISTORY OF ENDOVASCULAR STENT GRAFT FOR ABDOMINAL AORTIC ANEURYSM: ICD-10-CM

## 2022-03-23 PROCEDURE — 74174 CTA ABD&PLVS W/CONTRAST: CPT

## 2022-03-23 PROCEDURE — 6360000004 HC RX CONTRAST MEDICATION: Performed by: RADIOLOGY

## 2022-03-23 RX ADMIN — IOPAMIDOL 75 ML: 755 INJECTION, SOLUTION INTRAVENOUS at 13:24

## 2022-04-04 PROCEDURE — 9900000038 HC CARDIAC REHAB PHASE 3 - MONTHLY

## 2022-04-11 DIAGNOSIS — E11.9 TYPE 2 DIABETES MELLITUS WITHOUT COMPLICATION, WITHOUT LONG-TERM CURRENT USE OF INSULIN (HCC): ICD-10-CM

## 2022-04-11 RX ORDER — GLIMEPIRIDE 2 MG/1
TABLET ORAL
Qty: 90 TABLET | Refills: 1 | Status: SHIPPED | OUTPATIENT
Start: 2022-04-11 | End: 2022-08-29 | Stop reason: SDUPTHER

## 2022-04-11 RX ORDER — EMPAGLIFLOZIN 10 MG/1
TABLET, FILM COATED ORAL
Qty: 90 TABLET | Refills: 1 | Status: SHIPPED | OUTPATIENT
Start: 2022-04-11 | End: 2022-08-29 | Stop reason: SDUPTHER

## 2022-04-20 NOTE — PROGRESS NOTES
[facility] PRE-ADMISSION TESTING INSTRUCTIONS    The Preadmission Testing patient is instructed accordingly using the following criteria (check applicable):    ARRIVAL INSTRUCTIONS:  [x] Parking the day of Surgery is located in the Main Entrance lot. Upon entering the door, make an immediate right to the surgery reception desk    [x] Bring photo ID and insurance card    [] Bring in a copy of Living will or Durable Power of  papers. [x] Please be sure to arrange for responsible adult to provide transportation to and from the hospital    [x] Please arrange for responsible adult to be with you for the 24 hour period post procedure due to having anesthesia      GENERAL INSTRUCTIONS:    [x] Nothing by mouth after midnight, including gum, candy, mints or water    [x] You may brush your teeth, but do not swallow any water    [x] Take medications as instructed with 1-2 oz of water    [x] Stop herbal supplements and vitamins 5 days prior to procedure    [x] Follow preop dosing of blood thinners per physician instructions    [] Take 1/2 dose of evening insulin, but no insulin after midnight    [x] No oral diabetic medications after midnight    [x] If diabetic and have low blood sugar or feel symptomatic, take 1-2oz apple juice only    [] Bring inhalers day of surgery    [] Bring C-PAP/ Bi-Pap day of surgery    [] Bring urine specimen day of surgery    [x] Shower or bath with soap, lather and rinse well, AM of Surgery, no lotion, powders or creams to surgical site    [] Follow bowel prep as instructed per surgeon    [x] No tobacco products within 24 hours of surgery     [x] No alcohol or illegal drug use within 24 hours of surgery.     [x] Jewelry, body piercing's, eyeglasses, contact lenses and dentures are not permitted into surgery (bring cases)      [] Please do not wear any nail polish, make up or hair products on the day of surgery    [x] You can expect a call the business day prior to procedure to notify you if your arrival time changes    [x] If you receive a survey after surgery we would greatly appreciate your comments    [] Parent/guardian of a minor must accompany their child and remain on the premises  the entire time they are under our care     [] Pediatric patients may bring favorite toy, blanket or comfort item with them    [] A caregiver or family member must remain with the patient during their stay if they are mentally handicapped, have dementia, disoriented or unable to use a call light or would be a safety concern if left unattended    [x] Please notify surgeon if you develop any illness between now and time of surgery (cold, cough, sore throat, fever, nausea, vomiting) or any signs of infections  including skin, wounds, and dental.    [x]  The Outpatient Pharmacy is available to fill your prescription here on your day of surgery, ask your preop nurse for details    [] Other instructions    EDUCATIONAL MATERIALS PROVIDED:    [] PAT Preoperative Education Packet/Booklet     [] Medication List    [] Transfusion bracelet applied with instructions    [] Shower with soap, lather and rinse well, and use CHG wipes provided the evening before surgery as instructed    [] Incentive spirometer with instructions

## 2022-04-20 NOTE — PROGRESS NOTES
Have you been tested for COVID  Yes           Have you been told you were positive for COVID No  Have you had any known exposure to someone that is positive for COVID No  Do you have a cough                   No              Do you have shortness of breath No                 Do you have a sore throat            No                Are you having chills                    No                Are you having muscle aches. No                    Please come to the hospital wearing a mask and have your significant other wear a mask as well. Both of you should check your temperature before leaving to come here,  if it is 100 or higher please call [phone] for instruction.

## 2022-04-22 ENCOUNTER — OFFICE VISIT (OUTPATIENT)
Dept: PRIMARY CARE CLINIC | Age: 82
End: 2022-04-22
Payer: MEDICARE

## 2022-04-22 VITALS
OXYGEN SATURATION: 97 % | HEIGHT: 67 IN | RESPIRATION RATE: 18 BRPM | BODY MASS INDEX: 24.4 KG/M2 | DIASTOLIC BLOOD PRESSURE: 70 MMHG | WEIGHT: 155.5 LBS | HEART RATE: 64 BPM | TEMPERATURE: 97.6 F | SYSTOLIC BLOOD PRESSURE: 128 MMHG

## 2022-04-22 DIAGNOSIS — Z98.890 HISTORY OF ABDOMINAL AORTIC ANEURYSM (AAA) REPAIR: ICD-10-CM

## 2022-04-22 DIAGNOSIS — E11.9 TYPE 2 DIABETES MELLITUS WITHOUT COMPLICATION, WITHOUT LONG-TERM CURRENT USE OF INSULIN (HCC): ICD-10-CM

## 2022-04-22 DIAGNOSIS — C61 PROSTATE CANCER (HCC): ICD-10-CM

## 2022-04-22 DIAGNOSIS — Z95.828 S/P FEMORAL-FEMORAL BYPASS SURGERY: ICD-10-CM

## 2022-04-22 DIAGNOSIS — E11.9 TYPE 2 DIABETES MELLITUS WITHOUT COMPLICATION, WITHOUT LONG-TERM CURRENT USE OF INSULIN (HCC): Primary | ICD-10-CM

## 2022-04-22 LAB
ALBUMIN SERPL-MCNC: 4.3 G/DL (ref 3.5–5.2)
ALP BLD-CCNC: 37 U/L (ref 40–129)
ALT SERPL-CCNC: 21 U/L (ref 0–40)
ANION GAP SERPL CALCULATED.3IONS-SCNC: 14 MMOL/L (ref 7–16)
AST SERPL-CCNC: 24 U/L (ref 0–39)
BILIRUB SERPL-MCNC: 0.3 MG/DL (ref 0–1.2)
BUN BLDV-MCNC: 48 MG/DL (ref 6–23)
CALCIUM SERPL-MCNC: 9.4 MG/DL (ref 8.6–10.2)
CHLORIDE BLD-SCNC: 105 MMOL/L (ref 98–107)
CO2: 22 MMOL/L (ref 22–29)
CREAT SERPL-MCNC: 1.4 MG/DL (ref 0.7–1.2)
GFR AFRICAN AMERICAN: 59
GFR NON-AFRICAN AMERICAN: 49 ML/MIN/1.73
GLUCOSE BLD-MCNC: 219 MG/DL (ref 74–99)
HBA1C MFR BLD: 7.3 % (ref 4–5.6)
POTASSIUM SERPL-SCNC: 4.7 MMOL/L (ref 3.5–5)
SODIUM BLD-SCNC: 141 MMOL/L (ref 132–146)
TOTAL PROTEIN: 6.8 G/DL (ref 6.4–8.3)

## 2022-04-22 PROCEDURE — G8420 CALC BMI NORM PARAMETERS: HCPCS | Performed by: PHYSICIAN ASSISTANT

## 2022-04-22 PROCEDURE — 4040F PNEUMOC VAC/ADMIN/RCVD: CPT | Performed by: PHYSICIAN ASSISTANT

## 2022-04-22 PROCEDURE — 1123F ACP DISCUSS/DSCN MKR DOCD: CPT | Performed by: PHYSICIAN ASSISTANT

## 2022-04-22 PROCEDURE — G8427 DOCREV CUR MEDS BY ELIG CLIN: HCPCS | Performed by: PHYSICIAN ASSISTANT

## 2022-04-22 PROCEDURE — 3044F HG A1C LEVEL LT 7.0%: CPT | Performed by: PHYSICIAN ASSISTANT

## 2022-04-22 PROCEDURE — 1036F TOBACCO NON-USER: CPT | Performed by: PHYSICIAN ASSISTANT

## 2022-04-22 PROCEDURE — 99214 OFFICE O/P EST MOD 30 MIN: CPT | Performed by: PHYSICIAN ASSISTANT

## 2022-04-22 ASSESSMENT — PATIENT HEALTH QUESTIONNAIRE - PHQ9
SUM OF ALL RESPONSES TO PHQ QUESTIONS 1-9: 0
SUM OF ALL RESPONSES TO PHQ QUESTIONS 1-9: 0
1. LITTLE INTEREST OR PLEASURE IN DOING THINGS: 0
SUM OF ALL RESPONSES TO PHQ QUESTIONS 1-9: 0
SUM OF ALL RESPONSES TO PHQ QUESTIONS 1-9: 0
2. FEELING DOWN, DEPRESSED OR HOPELESS: 0
SUM OF ALL RESPONSES TO PHQ9 QUESTIONS 1 & 2: 0

## 2022-04-22 NOTE — PROGRESS NOTES
Baldomero Bhardwajlivia  [birth year]  4/22/22      HPI:    Patient presents for: his 3 month check up, T2DM. Notes sugars have been doing very well in lower 100-110s most days. He watche shis diet, avoids sweets. Lipids controlled, last labs done 3 months ago. A1c due today. Since his last visit, he was found to have a AAA, and s/p repair with also a fem fem bypass, with Dr. Luisa Cordon. He had a FU CTA, and scan was stable, he was cleared by vascular for upcoming prostate CA treatments. He will see vascular every 6 months. He is starting with radiation (28 treatments) with Dr. Anna ramirez ULTRASOUND GUIDED [address] on 4/25/22. He is on hormone injections as well. Is hopeful. Is staying positive.          Past Medical History:   Diagnosis Date    Arthritis     CAD (coronary artery disease) 2002    follows with Dr. David Montemayor yearly    Carotid artery stenosis     Dr. Rob Martell per patient    GERD (gastroesophageal reflux disease)     Hyperlipidemia     Hypertension     Prostate cancer ([address]     Type 2 diabetes mellitus without complication ([address]         Past Surgical History:   Procedure Laterality Date    ABDOMINAL AORTIC ANEURYSM REPAIR, ENDOVASCULAR N/A 02/14/2022    ENDOVASCULAR REPAIR OF ABDOMINAL AORTIC ANEURYSM, WITH A FEMORAL TO FEMORAL BYPASS performed by Elton Hatfield MD at [address] GRAFT  2002    DIAGNOSTIC CARDIAC CATH LAB PROCEDURE  2002    ERCP      HERNIA REPAIR  2003    LITHOTRIPSY      OTHER SURGICAL HISTORY      pancreatic stent x2    [place] LAP,CHOLECYSTECTOMY N/A 09/06/2018    CHOLECYSTECTOMY LAPAROSCOPIC ROBOTIC performed by Lona Ewing MD at [address] ENDOSCOPY         Current Outpatient Medications   Medication Sig Dispense Refill    metFORMIN (GLUCOPHAGE) 1000 MG tablet TAKE 1 TABLET TWICE DAILY WITH MEALS 180 tablet 1    JARDIANCE 10 MG tablet TAKE 1 TABLET EVERY DAY 90 tablet 1    glimepiride (AMARYL) 2 MG tablet TAKE 1 TABLET EVERY MORNING 90 tablet 1    fenofibrate micronized (LOFIBRA) 134 MG capsule TAKE 1 CAPSULE EVERY DAY 90 capsule 0    isosorbide mononitrate (IMDUR) 30 MG extended release tablet TAKE 1 TABLET EVERY DAY 90 tablet 0    FreeStyle Lancets MISC use to check blood sugar daily 100 each 0    metoprolol succinate (TOPROL XL) 50 MG extended release tablet Take 1 tablet by mouth at bedtime 90 tablet 1    losartan (COZAAR) 100 MG tablet TAKE 1 TABLET EVERY MORNING 90 tablet 0    simvastatin (ZOCOR) 40 MG tablet TAKE 1 TABLET EVERY NIGHT 90 tablet 1    blood glucose test strips (FREESTYLE LITE) strip 1 each by Other route daily As needed. 50 each 5    Multiple Vitamins-Minerals (CENTRUM SILVER 50+MEN PO) Take by mouth daily       lipase-protease-amylase (CREON) 59814-52634 units delayed release capsule Take 1 capsule by mouth 3 times daily (with meals) (Patient taking differently: Take 1 capsule by mouth as needed (usual after he goes out to eat) ) 250 capsule 5    aspirin 81 MG EC tablet Take 81 mg by mouth daily        No current facility-administered medications for this visit. Allergies   Allergen Reactions    Pcn [Penicillins] Swelling     Hives, swelling when 10year old.  Grass Extracts [Gramineae Pollens]      roses    Percocet [Oxycodone-Acetaminophen] Other (See Comments)     dizzy       Family History   Problem Relation Age of Onset    Heart Disease Mother     High Blood Pressure Mother     Heart Disease Father     High Blood Pressure Father     Cancer Maternal Grandfather        Social History     Socioeconomic History    Marital status:       Spouse name: Not on file    Number of children: Not on file    Years of education: Not on file    Highest education level: Not on file   Occupational History    Not on file   Tobacco Use    Smoking status: Former Smoker Packs/day: 0.00     Years: 0.00     Pack years: 0.00     Types: Cigars     Start date: 1960     Quit date: 1998     Years since quittin.3    Smokeless tobacco: Never Used   Vaping Use    Vaping Use: Never used   Substance and Sexual Activity    Alcohol use: No    Drug use: No    Sexual activity: Never   Other Topics Concern    Not on file   Social History Narrative    Not on file     Social Determinants of Health     Financial Resource Strain: Low Risk     Difficulty of Paying Living Expenses: Not hard at all   Food Insecurity: No Food Insecurity    Worried About Running Out of Food in the Last Year: Never true    Cathryn of Food in the Last Year: Never true   Transportation Needs:     Lack of Transportation (Medical): Not on file    Lack of Transportation (Non-Medical): Not on file   Physical Activity:     Days of Exercise per Week: Not on file    Minutes of Exercise per Session: Not on file   Stress:     Feeling of Stress : Not on file   Social Connections:     Frequency of Communication with Friends and Family: Not on file    Frequency of Social Gatherings with Friends and Family: Not on file    Attends Jewish Services: Not on file    Active Member of [address] [place] [address] or Organizations: Not on file    Attends Club or Organization Meetings: Not on file    Marital Status: Not on file   Intimate Partner Violence:     Fear of Current or Ex-Partner: Not on file    Emotionally Abused: Not on file    Physically Abused: Not on file    Sexually Abused: Not on file   Housing Stability:     Unable to Pay for Housing in the Last Year: Not on file    Number of [place] in the Last Year: Not on file    Unstable Housing in the Last Year: Not on file       Review of Systems :  Review of Systems   Constitutional: Negative for fatigue, malaise, fever, chills, appetite change and unexpected weight change.    HENT: Negative for congestion, ear discharge, ear pain, facial swelling, mouth sores, postnasal drip, rhinorrhea, sinus pressure, sore throat, tinnitus and trouble swallowing. Eyes: Negative for vision changes, double vision, pain  Respiratory: Negative for cough, chest tightness, shortness of breath, chest heaviness, pleuritic pain,  wheezing. Cardiovascular: Negative for diaphoresis, chest pain, palpitations, or edema   Gastrointestinal: Negative for nausea, vomiting, abdominal pain, diarrhea, constipation, blood in stool, melena, changes in bowel habits, abdominal distention, anal bleeding and rectal pain. Endocrine: Negative for polydipsia, polyphagia and polyuria. No heat or cold intolerance. Genitourinary: Negative for dysuria, urgency, frequency, hematuria, difficulty urinating, nocturia. Musculoskeletal: Negative for myalgias, back pain, joint swelling and arthralgias. No gait disturbance. Skin: Negative for rash, lesions, hair or nail changes. Allergic/Immunologic: Negative for environmental allergies and food allergies. Neurological: Negative for dizziness, weakness, tremors, syncope, speech difficulty, light-headedness, bowel or bladder control changes, numbness and headaches. Hematological: Negative for adenopathy. Does not bruise/bleed easily. Psychiatric/Behavioral: Negative for suicidal ideas, behavioral problems, sleep disturbance and decreased concentration. The patient is not nervous/anxious. Unless otherwise stated in this report or unable to obtain because of the patient's clinical or mental status as evidenced by the medical record, this patients's positive and negative responses for Review of Systems, constitutional, psych, eyes, ENT, cardiovascular, respiratory, gastrointestinal, neurological, genitourinary, musculoskeletal, integument systems and systems related to the presenting problem are either stated in the preceding or were not pertinent or were negative for the symptoms and/or complaints related to the medical problem.         Physical Exam: Vitals:    04/22/22 1427   BP: 128/70   Pulse: 64   Resp: 18   Temp: 97.6 °F (36.4 °C)   TempSrc: Temporal   SpO2: 97%   Weight: 155 lb 8 oz (70.5 kg)   Height: 5' 7\" (1.702 m)     Constitutional:  A and O x 3, in NAD. Afebrile. Appears stated age. Head:  NCAT. Eyes:  PERRLA, EOMI without nystagmus. Conjunctiva normal. Sclera anicteric. Ears:  External ears without lesions. TM's clear bilaterally. Throat:  Pharynx without lesions. Airway patient. Mucous membranes pink and moist without lesions. Neck:  Supple. Nontender, no lymphadenopathy noted, no thyromegaly. Lungs:  Clear to auscultation and breath sounds equal.  Heart:  Regular rate and rhythm, normal S1 and S2, no m/r/g.  UE and LE distal pulses intact. Abdomen:  Soft, NTND, NABS x 4, no masses or organomegaly, no rebound or guarding. Well healed scars bilat inguinal area. MS: Normal, painless range of motion. No neurovascular deficit. 5/5 strength in UE's/LE's/ bilaterally. Skin:  No abrasions, ecchymoses, or lacerations unless noted elsewhere. Extremities  No cyanosis, clubbing, or edema noted. Neurological:   Oriented. Motor functions intact. Assessment/Plan:     Tea Le was seen today for diabetes, 3 month follow-up and irregular heart beat. Diagnoses and all orders for this visit:    Type 2 diabetes mellitus without complication, without long-term current use of insulin ([facility])  -     Cancel: POCT glycosylated hemoglobin (Hb A1C)  -     Comprehensive Metabolic Panel; Future  -     Hemoglobin A1C; Future   -has bene well controlled. Full panel lab sin 3 months. History of abdominal aortic aneurysm (AAA) repair  S/P femoral-femoral bypass surgery   -FU with vascular as advised. Prostate cancer ([address]   -starting radiation treatments upcoming, on lupron. Sees Dr. Saeid Villarreal. Return in about 3 months (around 7/22/2022).         Counseled regarding above diagnosis, including possible risks and complications,

## 2022-04-24 ENCOUNTER — PREP FOR PROCEDURE (OUTPATIENT)
Dept: UROLOGY | Age: 82
End: 2022-04-24

## 2022-04-24 RX ORDER — SODIUM CHLORIDE 9 MG/ML
INJECTION, SOLUTION INTRAVENOUS CONTINUOUS
Status: CANCELLED | OUTPATIENT
Start: 2022-04-24

## 2022-04-24 RX ORDER — SODIUM CHLORIDE 0.9 % (FLUSH) 0.9 %
5-40 SYRINGE (ML) INJECTION EVERY 12 HOURS SCHEDULED
Status: CANCELLED | OUTPATIENT
Start: 2022-04-24

## 2022-04-24 RX ORDER — SODIUM CHLORIDE 0.9 % (FLUSH) 0.9 %
5-40 SYRINGE (ML) INJECTION PRN
Status: CANCELLED | OUTPATIENT
Start: 2022-04-24

## 2022-04-24 RX ORDER — SODIUM CHLORIDE 9 MG/ML
INJECTION, SOLUTION INTRAVENOUS PRN
Status: CANCELLED | OUTPATIENT
Start: 2022-04-24

## 2022-04-24 RX ORDER — CIPROFLOXACIN 2 MG/ML
400 INJECTION, SOLUTION INTRAVENOUS
Status: CANCELLED | OUTPATIENT
Start: 2022-04-24 | End: 2022-04-24

## 2022-04-25 ENCOUNTER — ANESTHESIA (OUTPATIENT)
Dept: OPERATING ROOM | Age: 82
End: 2022-04-25
Payer: MEDICARE

## 2022-04-25 ENCOUNTER — HOSPITAL ENCOUNTER (OUTPATIENT)
Dept: CARDIAC REHAB | Age: 82
Discharge: HOME | End: 2022-04-25

## 2022-04-25 ENCOUNTER — HOSPITAL ENCOUNTER (OUTPATIENT)
Age: 82
Setting detail: OUTPATIENT SURGERY
Discharge: HOME | End: 2022-04-25
Attending: STUDENT IN AN ORGANIZED HEALTH CARE EDUCATION/TRAINING PROGRAM | Admitting: STUDENT IN AN ORGANIZED HEALTH CARE EDUCATION/TRAINING PROGRAM
Payer: MEDICARE

## 2022-04-25 ENCOUNTER — ANESTHESIA EVENT (OUTPATIENT)
Dept: OPERATING ROOM | Age: 82
End: 2022-04-25
Payer: MEDICARE

## 2022-04-25 VITALS
HEART RATE: 68 BPM | OXYGEN SATURATION: 98 % | WEIGHT: 149 LBS | DIASTOLIC BLOOD PRESSURE: 82 MMHG | HEIGHT: 67 IN | BODY MASS INDEX: 23.39 KG/M2 | SYSTOLIC BLOOD PRESSURE: 135 MMHG | TEMPERATURE: 98 F | RESPIRATION RATE: 16 BRPM

## 2022-04-25 VITALS — OXYGEN SATURATION: 99 % | SYSTOLIC BLOOD PRESSURE: 136 MMHG | DIASTOLIC BLOOD PRESSURE: 73 MMHG

## 2022-04-25 LAB — METER GLUCOSE: 103 MG/DL (ref 74–99)

## 2022-04-25 PROCEDURE — 3600000012 HC SURGERY LEVEL 2 ADDTL 15MIN: Performed by: STUDENT IN AN ORGANIZED HEALTH CARE EDUCATION/TRAINING PROGRAM

## 2022-04-25 PROCEDURE — 2580000003 HC RX 258: Performed by: NURSE PRACTITIONER

## 2022-04-25 PROCEDURE — C1889 IMPLANT/INSERT DEVICE, NOC: HCPCS | Performed by: STUDENT IN AN ORGANIZED HEALTH CARE EDUCATION/TRAINING PROGRAM

## 2022-04-25 PROCEDURE — 2709999900 HC NON-CHARGEABLE SUPPLY: Performed by: STUDENT IN AN ORGANIZED HEALTH CARE EDUCATION/TRAINING PROGRAM

## 2022-04-25 PROCEDURE — 3700000000 HC ANESTHESIA ATTENDED CARE: Performed by: STUDENT IN AN ORGANIZED HEALTH CARE EDUCATION/TRAINING PROGRAM

## 2022-04-25 PROCEDURE — 7100000011 HC PHASE II RECOVERY - ADDTL 15 MIN: Performed by: STUDENT IN AN ORGANIZED HEALTH CARE EDUCATION/TRAINING PROGRAM

## 2022-04-25 PROCEDURE — 2500000003 HC RX 250 WO HCPCS: Performed by: STUDENT IN AN ORGANIZED HEALTH CARE EDUCATION/TRAINING PROGRAM

## 2022-04-25 PROCEDURE — 2580000003 HC RX 258: Performed by: STUDENT IN AN ORGANIZED HEALTH CARE EDUCATION/TRAINING PROGRAM

## 2022-04-25 PROCEDURE — 3600000002 HC SURGERY LEVEL 2 BASE: Performed by: STUDENT IN AN ORGANIZED HEALTH CARE EDUCATION/TRAINING PROGRAM

## 2022-04-25 PROCEDURE — 7100000010 HC PHASE II RECOVERY - FIRST 15 MIN: Performed by: STUDENT IN AN ORGANIZED HEALTH CARE EDUCATION/TRAINING PROGRAM

## 2022-04-25 PROCEDURE — 6360000002 HC RX W HCPCS: Performed by: ANESTHESIOLOGIST ASSISTANT

## 2022-04-25 PROCEDURE — 82962 GLUCOSE BLOOD TEST: CPT

## 2022-04-25 PROCEDURE — 6360000002 HC RX W HCPCS: Performed by: NURSE PRACTITIONER

## 2022-04-25 PROCEDURE — 3700000001 HC ADD 15 MINUTES (ANESTHESIA): Performed by: STUDENT IN AN ORGANIZED HEALTH CARE EDUCATION/TRAINING PROGRAM

## 2022-04-25 DEVICE — SPACEOAR SYSTEMS
Type: IMPLANTABLE DEVICE | Status: FUNCTIONAL
Brand: SPACEOAR VUE™ SYSTEM - 10ML

## 2022-04-25 RX ORDER — FENTANYL CITRATE 50 UG/ML
INJECTION, SOLUTION INTRAMUSCULAR; INTRAVENOUS PRN
Status: DISCONTINUED | OUTPATIENT
Start: 2022-04-25 | End: 2022-04-25 | Stop reason: SDUPTHER

## 2022-04-25 RX ORDER — SODIUM CHLORIDE 9 MG/ML
INJECTION, SOLUTION INTRAVENOUS CONTINUOUS
Status: DISCONTINUED | OUTPATIENT
Start: 2022-04-25 | End: 2022-04-25 | Stop reason: HOSPADM

## 2022-04-25 RX ORDER — CIPROFLOXACIN 2 MG/ML
400 INJECTION, SOLUTION INTRAVENOUS
Status: COMPLETED | OUTPATIENT
Start: 2022-04-25 | End: 2022-04-25

## 2022-04-25 RX ORDER — SODIUM CHLORIDE 0.9 % (FLUSH) 0.9 %
5-40 SYRINGE (ML) INJECTION EVERY 12 HOURS SCHEDULED
Status: DISCONTINUED | OUTPATIENT
Start: 2022-04-25 | End: 2022-04-25 | Stop reason: HOSPADM

## 2022-04-25 RX ORDER — CIPROFLOXACIN 500 MG/1
500 TABLET, FILM COATED ORAL 2 TIMES DAILY
Qty: 6 TABLET | Refills: 0 | Status: SHIPPED | OUTPATIENT
Start: 2022-04-25 | End: 2022-04-28

## 2022-04-25 RX ORDER — SODIUM CHLORIDE 9 MG/ML
INJECTION, SOLUTION INTRAVENOUS PRN
Status: DISCONTINUED | OUTPATIENT
Start: 2022-04-25 | End: 2022-04-25 | Stop reason: HOSPADM

## 2022-04-25 RX ORDER — PROPOFOL 10 MG/ML
INJECTION, EMULSION INTRAVENOUS PRN
Status: DISCONTINUED | OUTPATIENT
Start: 2022-04-25 | End: 2022-04-25 | Stop reason: SDUPTHER

## 2022-04-25 RX ORDER — BUPIVACAINE HYDROCHLORIDE 5 MG/ML
INJECTION, SOLUTION EPIDURAL; INTRACAUDAL PRN
Status: DISCONTINUED | OUTPATIENT
Start: 2022-04-25 | End: 2022-04-25 | Stop reason: ALTCHOICE

## 2022-04-25 RX ORDER — SODIUM CHLORIDE 0.9 % (FLUSH) 0.9 %
SYRINGE (ML) INJECTION PRN
Status: DISCONTINUED | OUTPATIENT
Start: 2022-04-25 | End: 2022-04-25 | Stop reason: ALTCHOICE

## 2022-04-25 RX ORDER — SODIUM CHLORIDE 0.9 % (FLUSH) 0.9 %
5-40 SYRINGE (ML) INJECTION PRN
Status: DISCONTINUED | OUTPATIENT
Start: 2022-04-25 | End: 2022-04-25 | Stop reason: HOSPADM

## 2022-04-25 RX ADMIN — PROPOFOL 40 MG: 10 INJECTION, EMULSION INTRAVENOUS at 12:37

## 2022-04-25 RX ADMIN — CIPROFLOXACIN 400 MG: 2 INJECTION, SOLUTION INTRAVENOUS at 11:49

## 2022-04-25 RX ADMIN — FENTANYL CITRATE 25 MCG: 50 INJECTION, SOLUTION INTRAMUSCULAR; INTRAVENOUS at 12:36

## 2022-04-25 RX ADMIN — SODIUM CHLORIDE: 9 INJECTION, SOLUTION INTRAVENOUS at 12:29

## 2022-04-25 RX ADMIN — PROPOFOL 100 MCG/KG/MIN: 10 INJECTION, EMULSION INTRAVENOUS at 12:38

## 2022-04-25 RX ADMIN — FENTANYL CITRATE 50 MCG: 50 INJECTION, SOLUTION INTRAMUSCULAR; INTRAVENOUS at 12:31

## 2022-04-25 ASSESSMENT — PULMONARY FUNCTION TESTS
PIF_VALUE: 0

## 2022-04-25 NOTE — H&P
Clovis Burrows is a [de-identified] y.o. male with prostate cancer. Pt elected for radiation therapy. Pt is here for space oar  No new changes since seen in the office. Please see paper h and p    Past Medical History:   Diagnosis Date    Arthritis     CAD (coronary artery disease) 2002    follows with Dr. Tenorio Ramp yearly    Carotid artery stenosis     Dr. Zoya Cartwright per patient    GERD (gastroesophageal reflux disease)     Hyperlipidemia     Hypertension     [facility]     Type 2 diabetes mellitus without complication ([facility]        Past Surgical History:   Procedure Laterality Date    ABDOMINAL AORTIC ANEURYSM REPAIR, ENDOVASCULAR N/A 02/14/2022    ENDOVASCULAR REPAIR OF ABDOMINAL AORTIC ANEURYSM, WITH A FEMORAL TO FEMORAL BYPASS performed by Renae De La Cruz MD at [address] [place] [address] CATH LAB PROCEDURE  2002    ERCP      HERNIA REPAIR  2003    LITHOTRIPSY      OTHER SURGICAL HISTORY      pancreatic stent x2    [place] LAP,CHOLECYSTECTOMY N/A 09/06/2018    CHOLECYSTECTOMY LAPAROSCOPIC ROBOTIC performed by Lucretia Scott MD at [facility] OR    TONSILLECTOMY      UPPER GASTROINTESTINAL ENDOSCOPY         Family History   Problem Relation Age of Onset    Heart Disease Mother     High Blood Pressure Mother     Heart Disease Father     High Blood Pressure Father     Cancer Maternal Grandfather        Prior to Admission medications    Medication Sig Start Date End Date Taking?  Authorizing Provider   metFORMIN (GLUCOPHAGE) 1000 MG tablet TAKE 1 TABLET TWICE DAILY WITH MEALS 4/11/22   Сергей Labs, PA-C   JARDIANCE 10 MG tablet TAKE 1 TABLET EVERY DAY 4/11/22   Сергей [facility], PA-C   glimepiride (AMARYL) 2 MG tablet TAKE 1 TABLET EVERY MORNING 4/11/22   Сергей [facility], PA-C   fenofibrate micronized (LOFIBRA) 134 MG capsule TAKE 1 CAPSULE EVERY DAY 3/17/22   Nils De La Rosa DO   isosorbide mononitrate (IMDUR) 30 MG extended release tablet TAKE 1 TABLET EVERY DAY 3/17/22   Marycarmen Polanco,    FreeStyle Lancets MISC use to check blood sugar daily 3/3/22   Sweetie File, LILLIAN   metoprolol succinate (TOPROL XL) 50 MG extended release tablet Take 1 tablet by mouth at bedtime 22   Sweetie File, PA-[place]   losartan (COZAAR) 100 MG tablet TAKE 1 TABLET EVERY MORNING 22   Polien File, PA-C   simvastatin (ZOCOR) 40 MG tablet TAKE 1 TABLET EVERY NIGHT 22   Polien File, PA-C   blood glucose test strips (FREESTYLE LITE) strip 1 each by Other route daily As needed. 21   Sweetie File, LILLIAN   Multiple Vitamins-Minerals (CENTRUM SILVER 50+MEN PO) Take by mouth daily     Historical Provider, MD   lipase-protease-amylase (CREON) 09985-03267 units delayed release capsule Take 1 capsule by mouth 3 times daily (with meals)  Patient taking differently: Take 1 capsule by mouth as needed (usual after he goes out to eat)  8/10/18   Mikal Quiñones III, MD   aspirin 81 MG EC tablet Take 81 mg by mouth daily     Historical Provider, MD        Allergies: Pcn [penicillins], Grass extracts [gramineae pollens], and Percocet [oxycodone-acetaminophen]    Social History     Tobacco Use    Smoking status: Former Smoker     Packs/day: 0.00     Years: 0.00     Pack years: 0.00     Types: Cigars     Start date: 1960     Quit date: 1998     Years since quittin.3    Smokeless tobacco: Never Used   Substance Use Topics    Alcohol use: No        Review of Systems:  Respiratory: negative for cough and hemoptysis  Cardiovascular: negative for chest pain and dyspnea  Gastrointestinal: negative for abdominal pain, diarrhea, nausea and vomiting  Genitourinary: as per HPI, otherwise negative.   Derm: negative for rash and skin lesion(s)  Neurological: negative for seizures and tremors  Endocrine: negative for diabetic symptoms including polydipsia and polyuria  All other systems negative    Physical Exam:  Vitals:    04/25/22 1118   BP: (!) 170/78   Pulse: 54   Resp: 18   Temp: 97.1 °F (36.2 °C)   SpO2: 97%      Skin:  Warm and dry. No rash or bruises  HEENT:  PERRLA, EOMI  Neck:  No JVD, No thyromegaly  Cardiac:  RRR  Lungs:  No audible wheezes, symmetric respirations, non-labored  Abdomen: Soft, non-tender, non-distended  Extremities:  No clubbing, edema or cyanosis  Neurological:  Moves all extremities, normal DTR    Lab Results   Component Value Date    WBC 7.6 02/15/2022    HGB 12.5 02/15/2022    HCT 39.2 02/15/2022    MCV 97.8 02/15/2022     02/15/2022       Lab Results   Component Value Date    CREATININE 1.4 (H) 04/22/2022       Lab Results   Component Value Date    PSA 4.60 (H) 07/30/2021    PSA 2.58 07/20/2020    PSA 4.26 (H) 07/17/2020       No results for input(s): LABURIN in the last 72 hours. No results for input(s): BC in the last 72 hours. No results for input(s): [place] in the last 72 hours. Assessment and Plan:  1. Space oar.      Nura Earl MD

## 2022-04-25 NOTE — ANESTHESIA PRE PROCEDURE
Department of Anesthesiology  Preprocedure Note       Name:  Alexander Kaufman   Age:  [de-identified] y.o.  :  1940                                          MRN:  [id number]         Date:  2022      Surgeon: Lizeth Salinas):  Gilberto Campos MD    Procedure: Procedure(s):  ULTRASOUND GUIDED TRANSPERINEAL IMPLANT OF SPACEOAR    Medications prior to admission:   Prior to Admission medications    Medication Sig Start Date End Date Taking? Authorizing Provider   metFORMIN (GLUCOPHAGE) 1000 MG tablet TAKE 1 TABLET TWICE DAILY WITH MEALS 22   Melania Francisco PA-C   JARDIANCE 10 MG tablet TAKE 1 TABLET EVERY DAY 22   Melania Francisco PA-C   glimepiride (AMARYL) 2 MG tablet TAKE 1 TABLET EVERY MORNING 22   Melania Francisco PA-C   fenofibrate micronized (LOFIBRA) 134 MG capsule TAKE 1 CAPSULE EVERY DAY 3/17/22   Chepe Ingram,    isosorbide mononitrate (IMDUR) 30 MG extended release tablet TAKE 1 TABLET EVERY DAY 3/17/22   Jef Del Toro, DO   FreeStyle Lancets MISC use to check blood sugar daily 3/3/22   Melania Francisco PA-C   metoprolol succinate (TOPROL XL) 50 MG extended release tablet Take 1 tablet by mouth at bedtime 22   Melania Francisco PA-C   losartan (COZAAR) 100 MG tablet TAKE 1 TABLET EVERY MORNING 22   Melania Francisco PA-C   simvastatin (ZOCOR) 40 MG tablet TAKE 1 TABLET EVERY NIGHT 22   Melania Francisco PA-C   blood glucose test strips (FREESTYLE LITE) strip 1 each by Other route daily As needed.  21   Melania Francisco PA-C   Multiple Vitamins-Minerals (CENTRUM SILVER 50+MEN PO) Take by mouth daily     Historical Provider, MD   lipase-protease-amylase (CREON) 41361-64658 units delayed release capsule Take 1 capsule by mouth 3 times daily (with meals)  Patient taking differently: Take 1 capsule by mouth as needed (usual after he goes out to eat)  8/10/18   Flaquito Freire III, MD   aspirin 81 MG EC tablet Take 81 mg by mouth daily     Historical Provider, MD       Current medications: Current Facility-Administered Medications   Medication Dose Route Frequency Provider Last Rate Last Admin    0.9 % sodium chloride infusion   IntraVENous Continuous JUAN CARLOS Oquendo CNP        0.9 % sodium chloride infusion   IntraVENous PRN JUAN CARLOS Oquendo CNP        ciprofloxacin (CIPRO) IVPB 400 mg  400 mg IntraVENous On Call to [address] David, JUAN CARLOS - CNP        sodium chloride flush 0.9 % injection 5-40 mL  5-40 mL IntraVENous 2 times per day JUAN CARLOS Oquendo - CNP        sodium chloride flush 0.9 % injection 5-40 mL  5-40 mL IntraVENous PRN JUAN CARLOS Oquendo CNP           Allergies: Allergies   Allergen Reactions    Pcn [Penicillins] Swelling     Hives, swelling when 10year old.     Grass Extracts [Gramineae Pollens]      roses    Percocet [Oxycodone-Acetaminophen] Other (See Comments)     dizzy       Problem List:    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 Val Au yearly    Carotid artery stenosis     Dr. Jaswinder Quiñones per patient    GERD (gastroesophageal reflux disease)     Hyperlipidemia     Hypertension     [facility]     Type 2 diabetes mellitus without complication ([facility]        Past Surgical History:        Procedure Laterality Date    ABDOMINAL AORTIC ANEURYSM REPAIR, ENDOVASCULAR N/A 2022    ENDOVASCULAR REPAIR OF ABDOMINAL AORTIC ANEURYSM, WITH A FEMORAL TO FEMORAL BYPASS performed by Precious Novoa MD at [address] GRAFT  2002    DIAGNOSTIC CARDIAC CATH LAB PROCEDURE      ERCP      HERNIA REPAIR      LITHOTRIPSY      OTHER SURGICAL HISTORY      pancreatic stent x2    [place] LAP,CHOLECYSTECTOMY N/A 2018    CHOLECYSTECTOMY LAPAROSCOPIC ROBOTIC performed by Aishwarya Puri MD at [address] [facility]  ENDOSCOPY         Social History:    Social History     Tobacco Use    Smoking status: Former Smoker     Packs/day: 0.00     Years: 0.00     Pack years: 0.00     Types: Cigars     Start date: 1960     Quit date: 1998     Years since quittin.3    Smokeless tobacco: Never Used   Substance Use Topics    Alcohol use: No                                Counseling given: Not Answered      Vital Signs (Current):   Vitals:    22 1041   Weight: 149 lb (67.6 kg)   Height: 5' 7\" (1.702 m)                                              BP Readings from Last 3 Encounters:   22 128/70   22 132/68   22 102/79       NPO Status:                                                                                 BMI:   Wt Readings from Last 3 Encounters:   22 149 lb (67.6 kg)   22 155 lb 8 oz (70.5 kg)   02/15/22 147 lb (66.7 kg)     Body mass index is 23.34 kg/m².     CBC:   Lab Results   Component Value Date    WBC 7.6 02/15/2022    RBC 4.01 02/15/2022    HGB 12.5 02/15/2022    HCT 39.2 02/15/2022    MCV 97.8 02/15/2022    RDW 14.8 02/15/2022     02/15/2022       CMP:   Lab Results   Component Value Date     2022    K 4.7 2022 K 4.4 02/10/2022     04/22/2022    CO2 22 04/22/2022    BUN 48 04/22/2022    CREATININE 1.4 04/22/2022    GFRAA 59 04/22/2022    LABGLOM 49 04/22/2022    GLUCOSE 219 04/22/2022    PROT 6.8 04/22/2022    CALCIUM 9.4 04/22/2022    BILITOT 0.3 04/22/2022    ALKPHOS 37 04/22/2022    AST 24 04/22/2022    ALT 21 04/22/2022       POC Tests: No results for input(s): POCGLU, POCNA, POCK, POCCL, POCBUN, POCHEMO, POCHCT in the last 72 hours. Coags:   Lab Results   Component Value Date    PROTIME 11.3 02/10/2022    INR 1.0 02/10/2022       HCG (If Applicable): No results found for: PREGTESTUR, PREGSERUM, HCG, HCGQUANT     ABGs:   Lab Results   Component Value Date    PO2ART 184.8 02/14/2022    VLJ5BDI 33.8 02/14/2022    JHE8ZHR 18.8 02/14/2022        Type & Screen (If Applicable):  No results found for: LABABO, LABRH    Drug/Infectious Status (If Applicable):  No results found for: HIV, HEPCAB    COVID-19 Screening (If Applicable): No results found for: COVID19        Anesthesia Evaluation    Airway: Mallampati: II  TM distance: >3 FB   Neck ROM: full  Mouth opening: > = 3 FB Dental: normal exam   (+) upper dentures      Pulmonary: breath sounds clear to auscultation                             Cardiovascular:    (+) hypertension:, CAD:, CABG/stent:,         Rhythm: regular                      Neuro/Psych:               GI/Hepatic/Renal:   (+) GERD:, liver disease:,          ROS comment: Hepatic steatosis. Endo/Other:    (+) DiabetesType II DM, , malignancy/cancer. ROS comment: Prostate CA, Accucheck 116 Abdominal:             Vascular:   + PVD, aortic or cerebral, . Other Findings:               Anesthesia Plan      MAC     ASA 3       Induction: intravenous. Anesthetic plan and risks discussed with patient. Plan discussed with CRNA.                   Oksana Berger MD   4/25/2022

## 2022-04-25 NOTE — OP NOTE
Operative Note      Patient: Angelika Groves  YOB: 1940  MRN: [id number]    Date of Procedure: 4/25/2022    Pre-Op Diagnosis: PROSTATE CANCER    Post-Op Diagnosis: Same       Procedure(s):  ULTRASOUND GUIDED TRANSPERINEAL IMPLANT OF SPACEOAR    Surgeon(s):  Bibiana Yoon MD    Assistant:   * No surgical staff found *    Anesthesia: Monitor Anesthesia Care    Estimated Blood Loss (mL): Minimal    Complications: None    Specimens:   * No specimens in log *    Implants:  Implant Name Type Inv. Item Serial No.  Lot No. LRB No. Used Action   SPACER RAD THER SPACEOAR ARACELIS - [id number]  SPACER RAD THER SPACEOAR ARACELIS  [employer] [id number] N/A 1 Implanted         Drains: * No LDAs found *    Findings: see op note    Detailed Description of Procedure:       Patient was wheeled back into the operative suite. Upon entering the operative suite the patient was identified using name band and number. The patient was transferred the operative table and placed in supine position. Anesthesia was induced without any complications a surgical timeout was taken and all in the room in agreement the patient was then placed in the dorsolithotomy position. Patient's perineum was then prepped. Prior to needle insertion and axial measurement of the space between the prostate mid gland and rectum was noted. SpaceOAR hydrogel was prepared as described in the 's instructions for use while the patient was prepped. With the subject maintained in the dorsolithotomy position the transrectal ultrasound probe was positioned to enable visual guidance of the needle into the space between the prostate and the rectum. Under transrectal ultrasound guidance, the 15 cm 18-gauge needle was inserted through the rectourethralis muscle and the needle tip advanced to the perirectal fat inferior to the prostate all by using a transperineal approach with a side fire transrectal ultrasound guidance.   The needle position was confirmed in both the sagittal and axial fields. Saline was used to dissect the space between the did not BAs fascia and the anterior rectal wall. A space was created with with Hydro dissection    With the needle tip at the mid gland the axial field was viewed to confirm the needle was not in the rectal wall. While maintaining the desired position aspiration was done to ensure that the needle was not in the vascular space. The assembled SpaceOAR delivery system was then attached to the 18-gauge needle. Under ultrasound guidance, a smooth, continuous injection technique was used to dispense the SpaceOAR hydrogel into the space between the prostate and the rectum. The entire syringe contents 10 mm total were injected without stopping. Optimal visualization of the needle during hydrogel administration was maintained at all times. An actual measurement of the space between the prostate and the rectum immediately post injection was noted. No suspected penetration or compromise of the rectal wall occurred. Plan:    Patient is now ready to undergo his external beam radiation.   The patient will be placed on ciprofloxacin 500 mg twice a day for 3 days and follow-up with radiation oncology for further treatment      Electronically signed by Essence Hartman MD on 4/25/2022 at 12:47 PM

## 2022-04-25 NOTE — PROGRESS NOTES
Went over discharge instructions with patient and son. Both verbalized understanding of instructions. Patient's son is going to  scripts before discharge.

## 2022-04-25 NOTE — ANESTHESIA POSTPROCEDURE EVALUATION
Department of Anesthesiology  Postprocedure Note    Patient: Alicia Thacker  MRN: [id number]  YOB: 1940  Date of evaluation: 4/25/2022  Time:  1:19 PM     Procedure Summary     Date: 04/25/22 Room / Location: [address]    Anesthesia Start:  Anesthesia Stop:     Procedure: ULTRASOUND GUIDED TRANSPERINEAL IMPLANT OF SPACEOAR (N/A ) Diagnosis: (PROSTATE CANCER)    Surgeons: Latoya Houser MD Responsible Provider:     Anesthesia Type: MAC ASA Status: 3          Anesthesia Type: MAC    Karla Phase I: Karla Score: 10    Karla Phase II: Karla Score: 10    Last vitals: Reviewed and per EMR flowsheets.        Anesthesia Post Evaluation    Patient location during evaluation: PACU  Patient participation: complete - patient participated  Level of consciousness: awake  Pain score: 3  Airway patency: patent  Nausea & Vomiting: no nausea and no vomiting  Complications: no  Cardiovascular status: blood pressure returned to baseline  Respiratory status: acceptable  Hydration status: euvolemic

## 2022-05-02 ENCOUNTER — TELEPHONE (OUTPATIENT)
Dept: PRIMARY CARE CLINIC | Age: 82
End: 2022-05-02

## 2022-05-02 DIAGNOSIS — I10 ESSENTIAL HYPERTENSION: ICD-10-CM

## 2022-05-02 NOTE — TELEPHONE ENCOUNTER
----- Message from Deyanira Sebastians sent at 5/2/2022  9:24 AM EDT -----  Subject: Message to Provider    QUESTIONS  Information for Provider? Patient is with Maddy Piña and he knows that   she is leaving in June and is wanting to know if he can stay with in her   office. He starts radiation soon and wanted to make sure he was set with a   PCP at the office. ---------------------------------------------------------------------------  --------------  Maria Fernanda LATHAM  What is the best way for the office to contact you? OK to leave message on   voicemail  Preferred Call Back Phone Number? [phone]  ---------------------------------------------------------------------------  --------------  SCRIPT ANSWERS  Relationship to Patient?  Self

## 2022-05-03 RX ORDER — LOSARTAN POTASSIUM 100 MG/1
TABLET ORAL
Qty: 90 TABLET | Refills: 0 | Status: SHIPPED | OUTPATIENT
Start: 2022-05-03 | End: 2022-10-11 | Stop reason: SDUPTHER

## 2022-05-06 NOTE — TELEPHONE ENCOUNTER
Attempted to call pt. No voicemail set up yet. Dr. Licha Infante is not able to take on Mr. Cipriano Bradley as a new pt. Was calling to notify.

## 2022-05-10 NOTE — TELEPHONE ENCOUNTER
Attempted to reach pt again, no voice mail set up. Okay to schedule as a new pt with Dr. Mabel Zheng.

## 2022-05-16 PROCEDURE — 9900000038 HC CARDIAC REHAB PHASE 3 - MONTHLY

## 2022-05-20 ENCOUNTER — OFFICE VISIT (OUTPATIENT)
Dept: PRIMARY CARE CLINIC | Age: 82
End: 2022-05-20
Payer: MEDICARE

## 2022-05-20 VITALS
OXYGEN SATURATION: 98 % | WEIGHT: 155 LBS | HEART RATE: 62 BPM | DIASTOLIC BLOOD PRESSURE: 80 MMHG | TEMPERATURE: 97 F | SYSTOLIC BLOOD PRESSURE: 132 MMHG | BODY MASS INDEX: 24.28 KG/M2

## 2022-05-20 DIAGNOSIS — N18.31 STAGE 3A CHRONIC KIDNEY DISEASE (HCC): ICD-10-CM

## 2022-05-20 DIAGNOSIS — I10 ESSENTIAL HYPERTENSION: Primary | ICD-10-CM

## 2022-05-20 DIAGNOSIS — E78.5 HYPERLIPIDEMIA, UNSPECIFIED HYPERLIPIDEMIA TYPE: ICD-10-CM

## 2022-05-20 PROBLEM — N18.30 CHRONIC RENAL DISEASE, STAGE III (HCC): Status: ACTIVE | Noted: 2022-05-20

## 2022-05-20 PROCEDURE — 99213 OFFICE O/P EST LOW 20 MIN: CPT | Performed by: FAMILY MEDICINE

## 2022-05-20 PROCEDURE — G8427 DOCREV CUR MEDS BY ELIG CLIN: HCPCS | Performed by: FAMILY MEDICINE

## 2022-05-20 PROCEDURE — 4040F PNEUMOC VAC/ADMIN/RCVD: CPT | Performed by: FAMILY MEDICINE

## 2022-05-20 PROCEDURE — 1123F ACP DISCUSS/DSCN MKR DOCD: CPT | Performed by: FAMILY MEDICINE

## 2022-05-20 PROCEDURE — G8420 CALC BMI NORM PARAMETERS: HCPCS | Performed by: FAMILY MEDICINE

## 2022-05-20 PROCEDURE — 1036F TOBACCO NON-USER: CPT | Performed by: FAMILY MEDICINE

## 2022-05-20 RX ORDER — FENOFIBRATE 134 MG/1
CAPSULE ORAL
Qty: 90 CAPSULE | Refills: 0 | Status: SHIPPED | OUTPATIENT
Start: 2022-05-20 | End: 2022-10-26 | Stop reason: SDUPTHER

## 2022-05-20 RX ORDER — ISOSORBIDE MONONITRATE 30 MG/1
TABLET, EXTENDED RELEASE ORAL
Qty: 90 TABLET | Refills: 0 | Status: SHIPPED | OUTPATIENT
Start: 2022-05-20 | End: 2022-10-26 | Stop reason: SDUPTHER

## 2022-05-20 ASSESSMENT — ENCOUNTER SYMPTOMS: SHORTNESS OF BREATH: 0

## 2022-05-20 NOTE — PROGRESS NOTES
Angelika Groves, a male of 81 y.o. came to the office 5/20/2022. Patient Active Problem List   Diagnosis    Hypertension    Hyperlipidemia    CAD (coronary artery disease)    Esophageal stricture    GERD (gastroesophageal reflux disease)    Type 2 diabetes mellitus without complication, without long-term current use of insulin (HCC)    Gallstone pancreatitis    Pancreatitis, unspecified pancreatitis type    Hepatic steatosis    Bilateral renal cysts    Cystic disease of liver    History of ERCP    Pancreatic duct stones    Pro[facility] [address]    AAA (abdominal aortic aneurysm) without rupture ([facility] [address]    History of abdominal aortic aneurysm (AAA) repair    S/P femoral-femoral bypass surgery    Chronic renal disease, stage III ([address] [931311]          Hypertension  This is a chronic problem. The current episode started more than 1 year ago. The problem is controlled. Pertinent negatives include no chest pain, headaches, malaise/fatigue, palpitations, peripheral edema or shortness of breath. Past treatments include lifestyle changes and angiotensin blockers. There are no compliance problems. Hyperlipidemia  This is a chronic problem. The current episode started more than 1 year ago. The problem is controlled. Pertinent negatives include no chest pain, leg pain, myalgias or shortness of breath. Current antihyperlipidemic treatment includes statins. There are no compliance problems.     - goes to cardiac rehab 3 times a wk. On treadmill with incline and then lifts free weights. Stage 3 kidney dz: doing well. Allergies   Allergen Reactions    Pcn [Penicillins] Swelling     Hives, swelling when 10year old.     Grass Extracts [Gramineae Pollens]      roses    Percocet [Oxycodone-Acetaminophen] Other (See Comments)     dizzy       Current Outpatient Medications on File Prior to Visit   Medication Sig Dispense Refill    losartan (COZAAR) 100 MG tablet TAKE 1 TABLET EVERY MORNING 90 tablet 0  metFORMIN (GLUCOPHAGE) 1000 MG tablet TAKE 1 TABLET TWICE DAILY WITH MEALS 180 tablet 1    JARDIANCE 10 MG tablet TAKE 1 TABLET EVERY DAY 90 tablet 1    glimepiride (AMARYL) 2 MG tablet TAKE 1 TABLET EVERY MORNING 90 tablet 1    FreeStyle Lancets MISC use to check blood sugar daily 100 each 0    metoprolol succinate (TOPROL XL) 50 MG extended release tablet Take 1 tablet by mouth at bedtime 90 tablet 1    simvastatin (ZOCOR) 40 MG tablet TAKE 1 TABLET EVERY NIGHT 90 tablet 1    blood glucose test strips (FREESTYLE LITE) strip 1 each by Other route daily As needed. 50 each 5    Multiple Vitamins-Minerals (CENTRUM SILVER 50+MEN PO) Take by mouth daily       lipase-protease-amylase (CREON) 67407-86273 units delayed release capsule Take 1 capsule by mouth 3 times daily (with meals) (Patient taking differently: Take 1 capsule by mouth as needed (usual after he goes out to eat) ) 250 capsule 5    aspirin 81 MG EC tablet Take 81 mg by mouth daily       fenofibrate micronized (LOFIBRA) 134 MG capsule TAKE 1 CAPSULE EVERY DAY 90 capsule 0    isosorbide mononitrate (IMDUR) 30 MG extended release tablet TAKE 1 TABLET EVERY DAY 90 tablet 0     No current facility-administered medications on file prior to visit. Review of Systems   Constitutional: Negative for malaise/fatigue. Respiratory: Negative for shortness of breath. Cardiovascular: Negative for chest pain and palpitations. Musculoskeletal: Negative for myalgias. Neurological: Negative for headaches. other review of systems reviewed and are negative    OBJECTIVE:  /80   Pulse 62   Temp 97 °F (36.1 °C)   Wt 155 lb (70.3 kg)   SpO2 98%   BMI 24.28 kg/m²      Physical Exam  Vitals reviewed. Eyes:      General: No scleral icterus. Conjunctiva/sclera: Conjunctivae normal.   Neck:      Thyroid: No thyromegaly. Vascular: No carotid bruit. Cardiovascular:      Rate and Rhythm: Normal rate and regular rhythm.       Heart sounds: No murmur heard. Pulmonary:      Effort: Pulmonary effort is normal.      Breath sounds: Normal breath sounds. No wheezing or rales. Abdominal:      General: Bowel sounds are normal.      Palpations: Abdomen is soft. There is no mass. Tenderness: There is no abdominal tenderness. There is no guarding or rebound. Musculoskeletal:         General: Normal range of motion. Cervical back: Neck supple. Lymphadenopathy:      Cervical: No cervical adenopathy. Skin:     General: Skin is warm and dry. Neurological:      Mental Status: He is alert and oriented to person, place, and time. Psychiatric:         Mood and Affect: Mood normal.         ASSESSMENT AND PLAN:    [facility] was seen today for established new doctor. Diagnoses and all orders for this visit:    Essential hypertension    Stage 3a chronic kidney disease ([facility] [address]    Hyperlipidemia, unspecified hyperlipidemia type    - bp stable continue meds  - low chol diet  - continue exercise. Return in about 3 months (around 8/20/2022) for medicare pe and ov, or for acute problem.     Lynnette Velázquez, DO

## 2022-05-23 ENCOUNTER — HOSPITAL ENCOUNTER (OUTPATIENT)
Dept: CARDIAC REHAB | Age: 82
Discharge: HOME | End: 2022-05-23

## 2022-06-13 PROCEDURE — 9900000038 HC CARDIAC REHAB PHASE 3 - MONTHLY

## 2022-06-22 ENCOUNTER — OFFICE VISIT (OUTPATIENT)
Dept: CARDIOLOGY CLINIC | Age: 82
End: 2022-06-22
Payer: MEDICARE

## 2022-06-22 VITALS
HEIGHT: 67 IN | RESPIRATION RATE: 14 BRPM | SYSTOLIC BLOOD PRESSURE: 138 MMHG | WEIGHT: 152 LBS | BODY MASS INDEX: 23.86 KG/M2 | HEART RATE: 69 BPM | DIASTOLIC BLOOD PRESSURE: 70 MMHG

## 2022-06-22 DIAGNOSIS — I25.119 CORONARY ARTERY DISEASE INVOLVING NATIVE CORONARY ARTERY OF NATIVE HEART WITH ANGINA PECTORIS (HCC): Primary | ICD-10-CM

## 2022-06-22 PROCEDURE — G8420 CALC BMI NORM PARAMETERS: HCPCS | Performed by: INTERNAL MEDICINE

## 2022-06-22 PROCEDURE — 1123F ACP DISCUSS/DSCN MKR DOCD: CPT | Performed by: INTERNAL MEDICINE

## 2022-06-22 PROCEDURE — 93000 ELECTROCARDIOGRAM COMPLETE: CPT | Performed by: INTERNAL MEDICINE

## 2022-06-22 PROCEDURE — 1036F TOBACCO NON-USER: CPT | Performed by: INTERNAL MEDICINE

## 2022-06-22 PROCEDURE — G8427 DOCREV CUR MEDS BY ELIG CLIN: HCPCS | Performed by: INTERNAL MEDICINE

## 2022-06-22 PROCEDURE — 99214 OFFICE O/P EST MOD 30 MIN: CPT | Performed by: INTERNAL MEDICINE

## 2022-06-22 RX ORDER — PHENAZOPYRIDINE HYDROCHLORIDE 97.5 MG/1
TABLET ORAL
COMMUNITY
End: 2022-08-23 | Stop reason: ALTCHOICE

## 2022-06-22 RX ORDER — OXYBUTYNIN CHLORIDE 5 MG/1
TABLET ORAL
COMMUNITY
Start: 2022-06-20

## 2022-06-22 NOTE — PROGRESS NOTES
OUTPATIENT CARDIOLOGY FOLLOW-UP    Name: Mi Mccrary    Age: 81 y.o. Primary Care Physician: Paula Narayan DO    Date of Service: 6/22/2022    Chief Complaint:   Chief Complaint   Patient presents with    Coronary Artery Disease       Interim History:   Mr. Chasity Sebastian is a 43-year-old gentleman with history of type II diabetes, hypertension, hyperlipidemia,  CAD and underwent coronary artery bypass surgery ×5 2002 and is here for follow-up visit. He was seen in the office in 6/2/2021. Since his last visit, he was admitted to the hospital for abdominal aortic aneurysm endovascular repair and also underwent femoral-femoral bypass on 2/14/2022. He also told me that he was diagnosed with prostate cancer and underwent radiation therapy which she completed recently. He is not taking any hormonal therapy at this time. He did not encounter any cardiovascular events in the perioperative period. He is complaint with his medications. He exercises on a regular basis at least 5 times a week. He regularly walks without any dyspnea or chest pain. No new cardiac complaints since last cardiology evaluation. He denies recent chest pain, SOB, palpitations, lightheadedness, dizziness, syncope, PND, or orthopnea. SR on EKG.     Review of Systems:   Cardiac: As per HPI  General: No fever, chills  Pulmonary: As per HPI  HEENT: No visual disturbances, difficult swallowing  GI: No nausea, vomiting  Endocrine: No thyroid disease or DM  Musculoskeletal: VALDES x 4, no focal motor deficits  Skin: Intact, no rashes  Neuro/Psych: No headache or seizures    Past Medical History:  Past Medical History:   Diagnosis Date    Arthritis     CAD (coronary artery disease) 2002    follows with Dr. Gianna Mcnamara yearly    Carotid artery stenosis     Dr. Moriah Monae per patient    GERD (gastroesophageal reflux disease)     Hyperlipidemia     Hypertension     Prostate cancer ([facility] [address]     Type 2 diabetes mellitus without complication ([facility] [address] Past Surgical History:  Past Surgical History:   Procedure Laterality Date    ABDOMINAL AORTIC ANEURYSM REPAIR, ENDOVASCULAR N/A 2022    ENDOVASCULAR REPAIR OF ABDOMINAL AORTIC ANEURYSM, WITH A FEMORAL TO FEMORAL BYPASS performed by Eliud Anna MD at [address] GRAFT      DIAGNOSTIC CARDIAC CATH LAB PROCEDURE      ERCP      HERNIA REPAIR      LITHOTRIPSY      OTHER SURGICAL HISTORY      pancreatic stent x2    ME LAP,CHOLECYSTECTOMY N/A 2018    CHOLECYSTECTOMY LAPAROSCOPIC ROBOTIC performed by Sergio Lou MD at [address] N/A 2022    ULTRASOUND GUIDED TRANSPERINEAL IMPLANT OF SPACEOAR performed by Sekou Carlos MD at [address] ENDOSCOPY         Family History:  Family History   Problem Relation Age of Onset    Heart Disease Mother     High Blood Pressure Mother     Heart Disease Father     High Blood Pressure Father     Cancer Maternal Grandfather        Social History:  Social History     Socioeconomic History    Marital status:       Spouse name: Not on file    Number of children: Not on file    Years of education: Not on file    Highest education level: Not on file   Occupational History    Not on file   Tobacco Use    Smoking status: Former Smoker     Packs/day: 0.25     Years: 0.00     Pack years: 0.00     Types: Cigars     Start date: 1960     Quit date: 1998     Years since quittin.4    Smokeless tobacco: Never Used   Vaping Use    Vaping Use: Never used   Substance and Sexual Activity    Alcohol use: No    Drug use: No    Sexual activity: Never   Other Topics Concern    Not on file   Social History Narrative    Not on file     Social Determinants of Health     Financial Resource Strain:     Difficulty of Paying Living Expenses: Not on file   Food Insecurity:     Worried About Running Out of Food in the Last Year: Not on file    Ran Out of Food in the Last Year: Not on file   Transportation Needs:     Lack of Transportation (Medical): Not on file    Lack of Transportation (Non-Medical): Not on file   Physical Activity:     Days of Exercise per Week: Not on file    Minutes of Exercise per Session: Not on file   Stress:     Feeling of Stress : Not on file   Social Connections:     Frequency of Communication with Friends and Family: Not on file    Frequency of Social Gatherings with Friends and Family: Not on file    Attends Orthodox Services: Not on file    Active Member of [address] [place] [address] [employer] or Organizations: Not on file    Attends Club or Organization Meetings: Not on file    Marital Status: Not on file   Intimate Partner Violence:     Fear of Current or Ex-Partner: Not on file    Emotionally Abused: Not on file    Physically Abused: Not on file    Sexually Abused: Not on file   Housing Stability:     Unable to Pay for Housing in the Last Year: Not on file    Number of [place] in the Last Year: Not on file    Unstable Housing in the Last Year: Not on file       Allergies: Allergies   Allergen Reactions    Pcn [Penicillins] Swelling     Hives, swelling when 10year old.     Grass Extracts [Gramineae Pollens]      roses    Percocet [Oxycodone-Acetaminophen] Other (See Comments)     dizzy       Current Medications:  Current Outpatient Medications   Medication Sig Dispense Refill    phenazopyridine (EQ URINARY PAIN RELIEF MAX ST) 97.5 MG TABS Take by mouth      fenofibrate micronized (LOFIBRA) 134 MG capsule TAKE 1 CAPSULE EVERY DAY 90 capsule 0    isosorbide mononitrate (IMDUR) 30 MG extended release tablet TAKE 1 TABLET EVERY DAY 90 tablet 0    losartan (COZAAR) 100 MG tablet TAKE 1 TABLET EVERY MORNING 90 tablet 0    metFORMIN (GLUCOPHAGE) 1000 MG tablet TAKE 1 TABLET TWICE DAILY WITH MEALS 180 tablet 1    JARDIANCE 10 MG tablet TAKE 1 TABLET EVERY DAY 90 tablet 1    glimepiride (AMARYL) 2 MG tablet TAKE 1 TABLET EVERY MORNING 90 tablet 1    FreeStyle Lancets MISC use to check blood sugar daily 100 each 0    metoprolol succinate (TOPROL XL) 50 MG extended release tablet Take 1 tablet by mouth at bedtime 90 tablet 1    simvastatin (ZOCOR) 40 MG tablet TAKE 1 TABLET EVERY NIGHT 90 tablet 1    blood glucose test strips (FREESTYLE LITE) strip 1 each by Other route daily As needed. 50 each 5    Multiple Vitamins-Minerals (CENTRUM SILVER 50+MEN PO) Take by mouth daily       aspirin 81 MG EC tablet Take 81 mg by mouth daily       oxybutynin (DITROPAN) 5 MG tablet       lipase-protease-amylase (CREON) 74955-44766 units delayed release capsule Take 1 capsule by mouth 3 times daily (with meals) (Patient not taking: Reported on 6/22/2022) 250 capsule 5     No current facility-administered medications for this visit. Physical Exam:  /70   Pulse 69   Resp 14   Ht 5' 7\" (1.702 m)   Wt 152 lb (68.9 kg)   BMI 23.81 kg/m²   Wt Readings from Last 3 Encounters:   06/22/22 152 lb (68.9 kg)   05/20/22 155 lb (70.3 kg)   04/20/22 149 lb (67.6 kg)     Appearance: Awake, alert and oriented x 3, no acute respiratory distress  Skin: Intact, no rash  Head: Normocephalic, atraumatic  Eyes: EOMI, no conjunctival erythema  ENMT: No pharyngeal erythema, MMM, no rhinorrhea  Neck: Supple, no elevated JVP, no carotid bruits  Lungs: Clear to auscultation bilaterally. No wheezes, rales, or rhonchi.   Cardiac: Regular rate and rhythm, +S1S2, no murmurs apparent  Abdomen: Soft, nontender, +bowel sounds  Extremities: Moves all extremities x 4, no lower extremity edema  Neurologic: No focal motor deficits apparent, normal mood and affect, alert and oriented x 3  Peripheral Pulses: Intact posterior tibial pulses bilaterally    Laboratory Tests:  Lab Results   Component Value Date    CREATININE 1.4 (H) 04/22/2022    BUN 48 (H) 04/22/2022     04/22/2022    K 4.7 04/22/2022     04/22/2022    CO2 22 04/22/2022     No results found for: MG  Lab Results   Component Value Date    WBC 7.6 02/15/2022    HGB 12.5 02/15/2022    HCT 39.2 02/15/2022    MCV 97.8 02/15/2022     02/15/2022     Lab Results   Component Value Date    ALT 21 04/22/2022    AST 24 04/22/2022    ALKPHOS 37 (L) 04/22/2022    BILITOT 0.3 04/22/2022     Lab Results   Component Value Date    CKTOTAL 143 05/25/2021    TROPONINI <0.01 10/25/2018     Lab Results   Component Value Date    INR 1.0 02/10/2022    PROTIME 11.3 02/10/2022     No results found for: TSHFT4, TSH  Lab Results   Component Value Date    LABA1C 7.3 (H) 04/22/2022     No results found for: EAG  Lab Results   Component Value Date    CHOL 102 01/12/2022    CHOL 113 05/25/2021    CHOL 125 01/29/2021     Lab Results   Component Value Date    TRIG 87 01/12/2022    TRIG 114 05/25/2021    TRIG 101 01/29/2021     Lab Results   Component Value Date    HDL 32 01/12/2022    HDL 30 05/25/2021    HDL 36 01/29/2021     Lab Results   Component Value Date    LDLCALC 53 01/12/2022    LDLCALC 60 05/25/2021    LDLCALC 69 01/29/2021     Lab Results   Component Value Date    LABVLDL 17 01/12/2022    LABVLDL 23 05/25/2021    LABVLDL 20 01/29/2021     No results found for: CHOLHDLRATIO    Cardiac Tests:  ECG: Normal sinus rhythm, normal ECG. No changes were noted. Echocardiogram: 2011-LVEF 65%, anteroseptal walls appear hypokinetic, stage I diastolic dysfunction, mild AI, trace MR and trace TR. Normal PSA systolic pressure. Stress test:  9/16/2011-abnormal myocardial perfusion scan with ischemia in apical and inferior apical walls. There is no evidence of transmural infarction. The ASCVD Risk score (Forest Erickson., et al., 2013) failed to calculate for the following reasons: The 2013 ASCVD risk score is only valid for ages 36 to 78    Lipid panel on 5/7/2018-total cholesterol 109, HDL 25, LDL 63, triglycerides 107, VLDL 21.     ASSESSMENT:  1. CAD, status post CABG ×5 in 2002>>stable  2. Hypertension well controlled  3. Hyperlipidemia on statin well controlled, LDL 53  4. Diastolic dysfunction stage I  5. Type II diabetes  6. Abdominal aortic aneurysm s/p EVAR February 2022  7. PAD status post femoral to femoral bypass surgery-February 2022  8. Prostate cancer s/p XRT      Plan:   1. Continue current medical regimen including aspirin, statin and fenofibrate  2. Continue Toprol-XL, isosorbide and Jardiance  3. Management of diabetes as per his primary care provider. 4. Continue regular exercise  5. Monitor blood pressures at home and cut down the salt intake to less than 2 g.     6. Recent lipid panel done on 1/12/2022 were reviewed, LDL is at goal level LDL 53, HDL 32, triglycerides 87 and cholesterol 102 (  7. Follow up in one year. The patient's current medication list, allergies, problem list and results of all previously ordered testing were reviewed at today's visit.   Iván Elder MD  [facility]) Cardiology

## 2022-06-22 NOTE — PATIENT INSTRUCTIONS
1. Continue current medical regimen including aspirin, statin and fenofibrate  2. Continue Toprol-XL, isosorbide and Jardiance  3. Management of diabetes as per his primary care provider. 4. Continue regular exercise  5. Monitor blood pressures at home and cut down the salt intake to less than 2 g.     6. Recent lipid panel done on 1/12/2022 were reviewed, LDL is at goal level LDL 53, HDL 32, triglycerides 87 and cholesterol 102 (  7. Follow up in one year.

## 2022-06-27 ENCOUNTER — HOSPITAL ENCOUNTER (OUTPATIENT)
Dept: CARDIAC REHAB | Age: 82
Discharge: HOME | End: 2022-06-27

## 2022-07-18 DIAGNOSIS — I25.119 CORONARY ARTERY DISEASE INVOLVING NATIVE CORONARY ARTERY OF NATIVE HEART WITH ANGINA PECTORIS (HCC): Chronic | ICD-10-CM

## 2022-07-18 DIAGNOSIS — I10 HYPERTENSION, UNSPECIFIED TYPE: ICD-10-CM

## 2022-07-18 RX ORDER — METOPROLOL SUCCINATE 50 MG/1
50 TABLET, EXTENDED RELEASE ORAL NIGHTLY
Qty: 90 TABLET | Refills: 0 | Status: SHIPPED
Start: 2022-07-18 | End: 2022-09-21

## 2022-07-25 ENCOUNTER — HOSPITAL ENCOUNTER (OUTPATIENT)
Dept: CARDIAC REHAB | Age: 82
Discharge: HOME | End: 2022-07-25

## 2022-07-25 PROCEDURE — 9900000038 HC CARDIAC REHAB PHASE 3 - MONTHLY

## 2022-08-20 PROCEDURE — 9900000038 HC CARDIAC REHAB PHASE 3 - MONTHLY

## 2022-08-23 ENCOUNTER — OFFICE VISIT (OUTPATIENT)
Dept: PRIMARY CARE CLINIC | Age: 82
End: 2022-08-23
Payer: MEDICARE

## 2022-08-23 VITALS
DIASTOLIC BLOOD PRESSURE: 78 MMHG | OXYGEN SATURATION: 99 % | HEART RATE: 59 BPM | HEIGHT: 67 IN | BODY MASS INDEX: 24.33 KG/M2 | WEIGHT: 155 LBS | SYSTOLIC BLOOD PRESSURE: 138 MMHG | TEMPERATURE: 97.2 F

## 2022-08-23 DIAGNOSIS — E11.9 TYPE 2 DIABETES MELLITUS WITHOUT COMPLICATION, WITHOUT LONG-TERM CURRENT USE OF INSULIN (HCC): ICD-10-CM

## 2022-08-23 DIAGNOSIS — Z00.00 MEDICARE ANNUAL WELLNESS VISIT, SUBSEQUENT: Primary | ICD-10-CM

## 2022-08-23 PROBLEM — E11.22 TYPE 2 DIABETES MELLITUS WITH CHRONIC KIDNEY DISEASE (HCC): Status: ACTIVE | Noted: 2022-08-23

## 2022-08-23 LAB
ALBUMIN SERPL-MCNC: 4.2 G/DL (ref 3.5–5.2)
ALP BLD-CCNC: 35 U/L (ref 40–129)
ALT SERPL-CCNC: 21 U/L (ref 0–40)
ANION GAP SERPL CALCULATED.3IONS-SCNC: 14 MMOL/L (ref 7–16)
AST SERPL-CCNC: 28 U/L (ref 0–39)
BILIRUB SERPL-MCNC: 0.4 MG/DL (ref 0–1.2)
BUN BLDV-MCNC: 33 MG/DL (ref 6–23)
CALCIUM SERPL-MCNC: 9.2 MG/DL (ref 8.6–10.2)
CHLORIDE BLD-SCNC: 108 MMOL/L (ref 98–107)
CHOLESTEROL, TOTAL: 141 MG/DL (ref 0–199)
CO2: 20 MMOL/L (ref 22–29)
CREAT SERPL-MCNC: 1.4 MG/DL (ref 0.7–1.2)
GFR AFRICAN AMERICAN: 59
GFR NON-AFRICAN AMERICAN: 49 ML/MIN/1.73
GLUCOSE BLD-MCNC: 197 MG/DL (ref 74–99)
HBA1C MFR BLD: 6.5 % (ref 4–5.6)
HDLC SERPL-MCNC: 32 MG/DL
LDL CHOLESTEROL CALCULATED: 79 MG/DL (ref 0–99)
POTASSIUM SERPL-SCNC: 4.6 MMOL/L (ref 3.5–5)
SODIUM BLD-SCNC: 142 MMOL/L (ref 132–146)
TOTAL PROTEIN: 6.3 G/DL (ref 6.4–8.3)
TRIGL SERPL-MCNC: 150 MG/DL (ref 0–149)
VLDLC SERPL CALC-MCNC: 30 MG/DL

## 2022-08-23 PROCEDURE — 1123F ACP DISCUSS/DSCN MKR DOCD: CPT | Performed by: FAMILY MEDICINE

## 2022-08-23 PROCEDURE — 3051F HG A1C>EQUAL 7.0%<8.0%: CPT | Performed by: FAMILY MEDICINE

## 2022-08-23 PROCEDURE — G0439 PPPS, SUBSEQ VISIT: HCPCS | Performed by: FAMILY MEDICINE

## 2022-08-23 RX ORDER — TAMSULOSIN HYDROCHLORIDE 0.4 MG/1
0.4 CAPSULE ORAL
COMMUNITY
Start: 2022-08-01

## 2022-08-23 RX ORDER — PHENAZOPYRIDINE HYDROCHLORIDE 99.5 MG/1
TABLET ORAL DAILY
COMMUNITY
End: 2022-08-23 | Stop reason: ALTCHOICE

## 2022-08-23 SDOH — ECONOMIC STABILITY: FOOD INSECURITY: WITHIN THE PAST 12 MONTHS, YOU WORRIED THAT YOUR FOOD WOULD RUN OUT BEFORE YOU GOT MONEY TO BUY MORE.: NEVER TRUE

## 2022-08-23 SDOH — ECONOMIC STABILITY: FOOD INSECURITY: WITHIN THE PAST 12 MONTHS, THE FOOD YOU BOUGHT JUST DIDN'T LAST AND YOU DIDN'T HAVE MONEY TO GET MORE.: NEVER TRUE

## 2022-08-23 ASSESSMENT — LIFESTYLE VARIABLES
HOW MANY STANDARD DRINKS CONTAINING ALCOHOL DO YOU HAVE ON A TYPICAL DAY: PATIENT DOES NOT DRINK
HOW OFTEN DO YOU HAVE A DRINK CONTAINING ALCOHOL: NEVER

## 2022-08-23 ASSESSMENT — PATIENT HEALTH QUESTIONNAIRE - PHQ9
1. LITTLE INTEREST OR PLEASURE IN DOING THINGS: 0
2. FEELING DOWN, DEPRESSED OR HOPELESS: 0
SUM OF ALL RESPONSES TO PHQ9 QUESTIONS 1 & 2: 0
SUM OF ALL RESPONSES TO PHQ QUESTIONS 1-9: 0

## 2022-08-23 ASSESSMENT — SOCIAL DETERMINANTS OF HEALTH (SDOH): HOW HARD IS IT FOR YOU TO PAY FOR THE VERY BASICS LIKE FOOD, HOUSING, MEDICAL CARE, AND HEATING?: NOT HARD AT ALL

## 2022-08-23 ASSESSMENT — ENCOUNTER SYMPTOMS
BLURRED VISION: 0
VISUAL CHANGE: 0

## 2022-08-23 NOTE — PROGRESS NOTES
Medicare Annual Wellness Visit    Nawaf Nova is here for Medicare AWV    Assessment & Plan   Medicare annual wellness visit, subsequent  Type 2 diabetes mellitus without complication, without long-term current use of insulin ([facility] [address]  -     Lipid Panel; Future  -     Comprehensive Metabolic Panel; Future  -     Hemoglobin A1C; Future    Recommendations for Preventive Services Due: see orders and patient instructions/AVS.  Recommended screening schedule for the next 5-10 years is provided to the patient in written form: see Patient Instructions/AVS.     Return in 4 months (on 12/23/2022) for Medicare Annual Wellness Visit in 1 year, or for acute problem. Subjective   The following acute and/or chronic problems were also addressed today:    Nawaf Nova, a male of 81 y.o. came to the office 8/23/2022. Patient Active Problem List   Diagnosis    Hypertension    Hyperlipidemia    CAD (coronary artery disease)    Esophageal stricture    GERD (gastroesophageal reflux disease)    Type 2 diabetes mellitus without complication, without long-term current use of insulin (HCC)    Gallstone pancreatitis    Pancreatitis, unspecified pancreatitis type    Hepatic steatosis    Bilateral renal cysts    Cystic disease of liver    History of ERCP    Pancreatic duct stones    Prostate cancer ([address]    AAA (abdominal aortic aneurysm) without rupture (HCC)    History of abdominal aortic aneurysm (AAA) repair    S/P femoral-femoral bypass surgery    Chronic renal disease, stage III ([address] [[id number]]    Type 2 diabetes mellitus with chronic kidney disease          Diabetes  He presents for his follow-up diabetic visit. He has type 2 diabetes mellitus. His disease course has been stable. Associated symptoms include polyuria. Pertinent negatives for diabetes include no blurred vision, no chest pain, no fatigue, no foot paresthesias, no foot ulcerations, no polydipsia, no visual change and no weight loss. Symptoms are stable.  Current diabetic treatment includes oral agent (triple therapy). He is following a generally healthy diet. He participates in exercise three times a week. An ACE inhibitor/angiotensin II receptor blocker is being taken. Allergies   Allergen Reactions    Pcn [Penicillins] Swelling     Hives, swelling when 10year old. Grass Extracts [Gramineae Pollens]      roses    Percocet [Oxycodone-Acetaminophen] Other (See Comments)     dizzy       Current Outpatient Medications on File Prior to Visit   Medication Sig Dispense Refill    tamsulosin (FLOMAX) 0.4 MG capsule 0.4 mg      Phenazopyridine HCl (AZO URINARY PAIN RELIEF) 99.5 MG TABS Take by mouth daily      metoprolol succinate (TOPROL XL) 50 MG extended release tablet Take 1 tablet by mouth at bedtime 90 tablet 0    oxybutynin (DITROPAN) 5 MG tablet       phenazopyridine (EQ URINARY PAIN RELIEF MAX ST) 97.5 MG TABS Take by mouth      fenofibrate micronized (LOFIBRA) 134 MG capsule TAKE 1 CAPSULE EVERY DAY 90 capsule 0    isosorbide mononitrate (IMDUR) 30 MG extended release tablet TAKE 1 TABLET EVERY DAY 90 tablet 0    losartan (COZAAR) 100 MG tablet TAKE 1 TABLET EVERY MORNING 90 tablet 0    metFORMIN (GLUCOPHAGE) 1000 MG tablet TAKE 1 TABLET TWICE DAILY WITH MEALS 180 tablet 1    JARDIANCE 10 MG tablet TAKE 1 TABLET EVERY DAY 90 tablet 1    glimepiride (AMARYL) 2 MG tablet TAKE 1 TABLET EVERY MORNING 90 tablet 1    FreeStyle Lancets MISC use to check blood sugar daily 100 each 0    simvastatin (ZOCOR) 40 MG tablet TAKE 1 TABLET EVERY NIGHT 90 tablet 1    blood glucose test strips (FREESTYLE LITE) strip 1 each by Other route daily As needed.  50 each 5    Multiple Vitamins-Minerals (CENTRUM SILVER 50+MEN PO) Take by mouth daily       lipase-protease-amylase (CREON) 57707-04645 units delayed release capsule Take 1 capsule by mouth 3 times daily (with meals) 250 capsule 5    aspirin 81 MG EC tablet Take 81 mg by mouth daily        No current facility-administered medications on file prior to visit. Review of Systems   Constitutional:  Negative for fatigue and weight loss. Eyes:  Negative for blurred vision. Cardiovascular:  Negative for chest pain. Endocrine: Positive for polyuria. Negative for polydipsia. other review of systems reviewed and are negative    OBJECTIVE:  /78   Pulse 59   Temp 97.2 °F (36.2 °C)   Ht 5' 7\" (1.702 m)   Wt 155 lb (70.3 kg)   SpO2 99%   BMI 24.28 kg/m²      Physical Exam see below    ASSESSMENT AND PLAN:    Darya Kelley was seen today for medicare awv. Diagnoses and all orders for this visit:    Medicare annual wellness visit, subsequent    Type 2 diabetes mellitus without complication, without long-term current use of insulin ([facility] [address]  -     Lipid Panel; Future  -     Comprehensive Metabolic Panel; Future  -     Hemoglobin A1C; Future    - continue exercise and diet    Return in 4 months (on 12/23/2022) for Medicare Annual Wellness Visit in 1 year, or for acute problem. Jonh Velázquez DO            Patient's complete Health Risk Assessment and screening values have been reviewed and are found in Flowsheets. The following problems were reviewed today and where indicated follow up appointments were made and/or referrals ordered. Positive Risk Factor Screenings with Interventions:                Safety:  Do you have working smoke detectors?: Yes  Do you have any tripping hazards - loose or unsecured carpets or rugs?: No  Do you have any tripping hazards - clutter in doorways, halls, or stairs?: No  Do you have either shower bars, grab bars, non-slip mats or non-slip surfaces in your shower or bathtub?: Yes  Do all of your stairways have a railing or banister?: Yes  Do you always fasten your seatbelt when you are in a car?: (!) No  Safety Interventions:  Does wear a seat belt.            Objective   Vitals:    08/23/22 0902   BP: 138/78   Pulse: 59   Temp: 97.2 °F (36.2 °C)   SpO2: 99%   Weight: 155 lb (70.3 kg) Height: 5' 7\" (1.702 m)      Body mass index is 24.28 kg/m². General Appearance: alert and oriented to person, place and time, well developed and well- nourished, in no acute distress  Skin: warm and dry, no rash or erythema  Head: normocephalic and atraumatic  Eyes: pupils equal, round, and reactive to light, extraocular eye movements intact, conjunctivae normal  ENT: tympanic membrane, external ear and ear canal normal bilaterally, nose without deformity, nasal mucosa and turbinates normal without polyps. Tongue : distal left hematoma. Neck: supple and non-tender without mass, no thyromegaly or thyroid nodules, no cervical lymphadenopathy  Pulmonary/Chest: clear to auscultation bilaterally- no wheezes, rales or rhonchi, normal air movement, no respiratory distress  Cardiovascular: normal rate, regular rhythm, normal S1 and S2, no murmurs, rubs, clicks, or gallops, distal pulses intact, no carotid bruits  Abdomen: soft, non-tender, non-distended, normal bowel sounds, no masses or organomegaly  Extremities: no cyanosis, clubbing or edema  Musculoskeletal: normal range of motion, no joint swelling, deformity or tenderness  Neurologic: reflexes normal and symmetric, no cranial nerve deficit, gait, coordination and speech normal       Allergies   Allergen Reactions    Pcn [Penicillins] Swelling     Hives, swelling when 10year old. Grass Extracts [Gramineae Pollens]      roses    Percocet [Oxycodone-Acetaminophen] Other (See Comments)     dizzy     Prior to Visit Medications    Medication Sig Taking?  Authorizing Provider   tamsulosin (FLOMAX) 0.4 MG capsule 0.4 mg Yes Historical Provider, MD   Phenazopyridine HCl (AZO URINARY PAIN RELIEF) 99.5 MG TABS Take by mouth daily Yes Historical Provider, MD   metoprolol succinate (TOPROL XL) 50 MG extended release tablet Take 1 tablet by mouth at bedtime Yes Rigo Velázquez,    oxybutynin (DITROPAN) 5 MG tablet  Yes Historical Provider, MD   phenazopyridine (EQ URINARY PAIN RELIEF MAX ST) 97.5 MG TABS Take by mouth Yes Historical Provider, MD   fenofibrate micronized (LOFIBRA) 134 MG capsule TAKE 1 CAPSULE EVERY DAY Yes Randi Montes De Oca PA-C   isosorbide mononitrate (IMDUR) 30 MG extended release tablet TAKE 1 TABLET EVERY DAY Yes Randi Montes De Oca PA-C   losartan (COZAAR) 100 MG tablet TAKE 1 TABLET EVERY MORNING Yes Randi Montes De Oca PA-C   metFORMIN (GLUCOPHAGE) 1000 MG tablet TAKE 1 TABLET TWICE DAILY WITH MEALS Yes Randi Montes De Oca PA-C   JARDIANCE 10 MG tablet TAKE 1 TABLET EVERY DAY Yes Randi Montes De Oca PA-C   glimepiride (AMARYL) 2 MG tablet TAKE 1 TABLET EVERY MORNING Yes Sridhar Edmond PA-C   FreeStyle Lancets MISC use to check blood sugar daily Yes Sridhar Edmond PA-C   simvastatin (ZOCOR) 40 MG tablet TAKE 1 TABLET EVERY NIGHT Yes Randi Montes De Oca PA-C   blood glucose test strips (FREESTYLE LITE) strip 1 each by Other route daily As needed.  Yes Sridhar Edmond PA-C   Multiple Vitamins-Minerals (CENTRUM SILVER 50+MEN PO) Take by mouth daily  Yes Historical Provider, MD   lipase-protease-amylase (CREON) 84764-50366 units delayed release capsule Take 1 capsule by mouth 3 times daily (with meals) Yes Carlos Garcia III, MD   aspirin 81 MG EC tablet Take 81 mg by mouth daily  Yes Historical Provider, MD Diana (Including outside providers/suppliers regularly involved in providing care):   Patient Care Team:  Sara Monroy DO as PCP - General (Family Medicine)  Sara Monroy DO as PCP - REHABILITATION [facility] Empaneled Provider  Elizabeth Ontiveros MD as Consulting Physician (Cardiology)     Reviewed and updated this visit:  Tobacco  Allergies  Meds  Problems  Med Hx  Surg Hx  Soc Hx  Fam Hx

## 2022-08-23 NOTE — PATIENT INSTRUCTIONS
Personalized Preventive Plan for Em Perez - 8/23/2022  Medicare offers a range of preventive health benefits. Some of the tests and screenings are paid in full while other may be subject to a deductible, co-insurance, and/or copay. Some of these benefits include a comprehensive review of your medical history including lifestyle, illnesses that may run in your family, and various assessments and screenings as appropriate. After reviewing your medical record and screening and assessments performed today your provider may have ordered immunizations, labs, imaging, and/or referrals for you. A list of these orders (if applicable) as well as your Preventive Care list are included within your After Visit Summary for your review. Other Preventive Recommendations:    A preventive eye exam performed by an eye specialist is recommended every 1-2 years to screen for glaucoma; cataracts, macular degeneration, and other eye disorders. A preventive dental visit is recommended every 6 months. Try to get at least 150 minutes of exercise per week or 10,000 steps per day on a pedometer . Order or download the FREE \"Exercise & Physical Activity: Your Everyday Guide\" from [employer]. Call 6-430.414.1131 or search [employer] online. You need 0166-5032 mg of calcium and 7028-9728 IU of vitamin D per day. It is possible to meet your calcium requirement with diet alone, but a vitamin D supplement is usually necessary to meet this goal.  When exposed to the sun, use a sunscreen that protects against both UVA and UVB radiation with an SPF of 30 or greater. Reapply every 2 to 3 hours or after sweating, drying off with a towel, or swimming. Always wear a seat belt when traveling in a car. Always wear a helmet when riding a bicycle or motorcycle.

## 2022-08-29 ENCOUNTER — HOSPITAL ENCOUNTER (OUTPATIENT)
Dept: CARDIAC REHAB | Age: 82
Discharge: HOME | End: 2022-08-29

## 2022-08-29 DIAGNOSIS — E11.9 TYPE 2 DIABETES MELLITUS WITHOUT COMPLICATION, WITHOUT LONG-TERM CURRENT USE OF INSULIN (HCC): ICD-10-CM

## 2022-08-29 RX ORDER — GLIMEPIRIDE 2 MG/1
TABLET ORAL
Qty: 90 TABLET | Refills: 1 | Status: SHIPPED | OUTPATIENT
Start: 2022-08-29

## 2022-08-29 RX ORDER — SIMVASTATIN 40 MG
TABLET ORAL
Qty: 90 TABLET | Refills: 1 | Status: SHIPPED | OUTPATIENT
Start: 2022-08-29

## 2022-09-06 LAB — DIABETIC RETINOPATHY: NEGATIVE

## 2022-09-07 PROCEDURE — 9900000038 HC CARDIAC REHAB PHASE 3 - MONTHLY

## 2022-09-12 ENCOUNTER — TELEPHONE (OUTPATIENT)
Dept: VASCULAR SURGERY | Age: 82
End: 2022-09-12

## 2022-09-13 ENCOUNTER — OFFICE VISIT (OUTPATIENT)
Dept: VASCULAR SURGERY | Age: 82
End: 2022-09-13
Payer: MEDICARE

## 2022-09-13 DIAGNOSIS — Z86.79 S/P AAA REPAIR USING BIFURCATION GRAFT: Primary | ICD-10-CM

## 2022-09-13 DIAGNOSIS — Z95.828 S/P AAA REPAIR USING BIFURCATION GRAFT: Primary | ICD-10-CM

## 2022-09-13 PROCEDURE — 1036F TOBACCO NON-USER: CPT | Performed by: SURGERY

## 2022-09-13 PROCEDURE — G8427 DOCREV CUR MEDS BY ELIG CLIN: HCPCS | Performed by: SURGERY

## 2022-09-13 PROCEDURE — 99213 OFFICE O/P EST LOW 20 MIN: CPT | Performed by: SURGERY

## 2022-09-13 PROCEDURE — 1123F ACP DISCUSS/DSCN MKR DOCD: CPT | Performed by: SURGERY

## 2022-09-13 PROCEDURE — G8420 CALC BMI NORM PARAMETERS: HCPCS | Performed by: SURGERY

## 2022-09-13 NOTE — PROGRESS NOTES
Vascular Surgery Outpatient Progress Note      Chief Complaint   Patient presents with    Circulatory Problem     Follow up AAA       HISTORY OF PRESENT ILLNESS:                The patient is a 81 y.o. male who returns for follow-up evaluation of previous endovascular aneurysm repair with femoral-femoral bypass. The patient states that he is fatigued from radiation therapy for his prostate cancer but otherwise is feeling well. Past Medical History:        Diagnosis Date    Arthritis     CAD (coronary artery disease) 2002    follows with Dr. Silvano Groves yearly    Carotid artery stenosis     Dr. Roman Weber per patient    GERD (gastroesophageal reflux disease)     Hyperlipidemia     Hypertension     Prostate cancer ([facility] [address]     Type 2 diabetes mellitus without complication [facility])      Past Surgical History:        Procedure Laterality Date    ABDOMINAL AORTIC ANEURYSM REPAIR, ENDOVASCULAR N/A 02/14/2022    ENDOVASCULAR REPAIR OF ABDOMINAL AORTIC ANEURYSM, WITH A FEMORAL TO FEMORAL BYPASS performed by Seferino aMrtines MD at [address]  2002    DIAGNOSTIC CARDIAC CATH LAB PROCEDURE  2002    ERCP      HERNIA REPAIR  2003    LITHOTRIPSY      OTHER SURGICAL HISTORY      pancreatic stent x2    AR LAP,CHOLECYSTECTOMY N/A 09/06/2018    CHOLECYSTECTOMY LAPAROSCOPIC ROBOTIC performed by Keisha Reno MD at [address] [facility] N/A 4/25/2022    ULTRASOUND GUIDED TRANSPERINEAL IMPLANT OF SPACEOAR performed by Kenneth Gaines MD at [facility] ENDOSCOPY       Current Medications:   Prior to Admission medications    Medication Sig Start Date End Date Taking?  Authorizing Provider   Phenazopyridine HCl (AZO TABS PO) Take by mouth   Yes Historical Provider, MD   glimepiride (AMARYL) 2 MG tablet TAKE 1 TABLET EVERY MORNING 8/29/22  Yes Rigo Velázquez,    empagliflozin (JARDIANCE) 10 MG tablet TAKE 1 years: 0.00     Types: Cigars, Cigarettes     Start date: 1960     Quit date: 1998     Years since quittin.7    Smokeless tobacco: Never   Vaping Use    Vaping Use: Never used   Substance and Sexual Activity    Alcohol use: No    Drug use: No    Sexual activity: Never   Other Topics Concern    Not on file   Social History Narrative    Not on file     Social Determinants of Health     Financial Resource Strain: Low Risk     Difficulty of Paying Living Expenses: Not hard at all   Food Insecurity: No Food Insecurity    Worried About Running Out of Food in the Last Year: Never true    Ran Out of Food in the Last Year: Never true   Transportation Needs: Not on file   Physical Activity: Sufficiently Active    Days of Exercise per Week: 3 days    Minutes of Exercise per Session: 60 min   Stress: Not on file   Social Connections: Not on file   Intimate Partner Violence: Not on file   Housing Stability: Not on file        Family History   Problem Relation Age of Onset    Heart Disease Mother     High Blood Pressure Mother     Heart Disease Father     High Blood Pressure Father     Cancer Maternal Grandfather        REVIEW OF SYSTEMS (New symptoms):    Eyes:      Blurred vision:  No [x]/Yes []               Diplopia:   No [x]/Yes []               Vision loss:       No [x]/Yes []   Ears, nose, throat:             Hearing loss:    No [x]/Yes []      Vertigo:   No [x]/Yes []                       Swallowing problem:  No [x]/Yes []               Nose bleeds:   No [x]/Yes []      Voice hoarseness:  No [x]/Yes []  Respiratory:             Cough:   No [x]/Yes []      Pleuritic chest pain:  No [x]/Yes []                        Dyspnea:   No [x]/Yes []      Wheezing:   No [x]/Yes []  Cardiovascular:             Angina:   No [x]/Yes []      Palpitations:   No [x]/Yes []          Claudication:    No [x]/Yes []      Leg swelling:   No [x]/Yes []  Gastrointestinal:             Nausea or vomiting:  No [x]/Yes [] Abdominal pain:  No [x]/Yes []                     Intestinal bleeding: No [x]/Yes []  Musculoskeletal:             Leg pain:   No [x]/Yes []      Back pain:   No [x]/Yes []                    Weakness:   No [x]/Yes []  Neurologic:             Numbness:   No [x]/Yes []      Paralysis:   No [x]/Yes []                       Headaches:   No [x]/Yes []  Hematologic, lymphatic:   Anemia:   No [x]/Yes []              Bleeding or bruising:  No [x]/Yes []              Fevers or chills: No [x]/Yes []  Endocrine:             Temp intolerance:   No [x]/Yes []                       Polydipsia, polyuria:  No [x]/Yes []  Skin:              Rash:    No [x]/Yes []      Ulcers:   No [x]/Yes []              Abnorm pigment: No [x]/Yes []  :              Frequency/urgency:  No []/Yes [x]      Hematuria:    No [x]/Yes []                      Incontinence:    No []/Yes [x]    PHYSICAL EXAM:  There were no vitals filed for this visit.   General Appearance: alert and oriented to person, place and time, in no acute distress, well developed and well- nourished  Neurologic: no cranial nerve deficit, speech normal  Head: normocephalic and atraumatic  Eyes: extraocular eye movements intact, conjunctivae normal  ENT: external ear and ear canal normal bilaterally, nose without deformity, no carotid bruits  Pulmonary/Chest: normal air movement, no respiratory distress  Cardiovascular: normal rate, regular rhythm, no murmur  Abdomen: non-distended, no masses  Musculoskeletal: no joint deformity or tenderness  Extremities: no leg edema bilaterally  Skin: warm and dry, no rash or erythema    PULSE EXAM      Right      Left   Brachial     Radial     Femoral     Popliteal     Dorsalis Pedis 3 3   Posterior Tibial     (3=normal, 2=diminished, 1=barely palpable, 4=widened)    RADIOLOGY: SA today    Problem List Items Addressed This Visit       S/P AAA repair using bifurcation graft - Primary       I reviewed with the patient that from my standpoint he can continue with his normal activities. I will plan to see him again in 6 months at which time we will repeat an ultrasound of the aorta. I asked him to call sooner with any changes or new problems. No follow-ups on file.

## 2022-09-21 DIAGNOSIS — I10 HYPERTENSION, UNSPECIFIED TYPE: ICD-10-CM

## 2022-09-21 DIAGNOSIS — I25.119 CORONARY ARTERY DISEASE INVOLVING NATIVE CORONARY ARTERY OF NATIVE HEART WITH ANGINA PECTORIS (HCC): Chronic | ICD-10-CM

## 2022-09-21 RX ORDER — METOPROLOL SUCCINATE 50 MG/1
TABLET, EXTENDED RELEASE ORAL
Qty: 90 TABLET | Refills: 1 | Status: SHIPPED | OUTPATIENT
Start: 2022-09-21

## 2022-09-26 ENCOUNTER — HOSPITAL ENCOUNTER (OUTPATIENT)
Dept: CARDIAC REHAB | Age: 82
Discharge: HOME | End: 2022-09-26

## 2022-09-29 ENCOUNTER — TELEPHONE (OUTPATIENT)
Dept: CASE MANAGEMENT | Age: 82
End: 2022-09-29

## 2022-09-29 NOTE — TELEPHONE ENCOUNTER
Met with patient after his follow up appointment with Dr Yonatan Irene today. Patient completed his active treatment June 2022 for his Prostate Cancer. Patient appears in good spirits. States he is doing good since finishing radiation. He denies any questions, concerns or needs at this time. Provided support and encouragement. Instructed patient in detail on his Cancer Treatment Summary and Survivorship Care Plan. Copies sent to patient's PCP and Urologist. Provided written copies of Treatment Summary/Survivorship Care Plan, Patient Resource: Cancer Survivorship: A Guide for Patients and Their Families booklet and [facility] [employer] handout. Patient will continue to follow with Urology. Instructed to call with any questions or concerns. Verbally agreed. Patient appreciative of visit and information.

## 2022-10-11 DIAGNOSIS — I10 ESSENTIAL HYPERTENSION: ICD-10-CM

## 2022-10-11 RX ORDER — LOSARTAN POTASSIUM 100 MG/1
TABLET ORAL
Qty: 90 TABLET | Refills: 1 | Status: SHIPPED | OUTPATIENT
Start: 2022-10-11

## 2022-10-20 PROCEDURE — 9900000038 HC CARDIAC REHAB PHASE 3 - MONTHLY

## 2022-10-26 RX ORDER — ISOSORBIDE MONONITRATE 30 MG/1
TABLET, EXTENDED RELEASE ORAL
Qty: 90 TABLET | Refills: 1 | Status: SHIPPED | OUTPATIENT
Start: 2022-10-26

## 2022-10-26 RX ORDER — FENOFIBRATE 134 MG/1
CAPSULE ORAL
Qty: 90 CAPSULE | Refills: 1 | Status: SHIPPED | OUTPATIENT
Start: 2022-10-26

## 2022-10-31 ENCOUNTER — HOSPITAL ENCOUNTER (OUTPATIENT)
Dept: CARDIAC REHAB | Age: 82
Discharge: HOME | End: 2022-10-31

## 2022-11-03 PROCEDURE — 9900000038 HC CARDIAC REHAB PHASE 3 - MONTHLY

## 2022-11-28 ENCOUNTER — HOSPITAL ENCOUNTER (OUTPATIENT)
Dept: CARDIAC REHAB | Age: 82
Discharge: HOME | End: 2022-11-28

## 2022-12-05 PROCEDURE — 9900000038 HC CARDIAC REHAB PHASE 3 - MONTHLY

## 2022-12-19 ENCOUNTER — HOSPITAL ENCOUNTER (OUTPATIENT)
Dept: CARDIAC REHAB | Age: 82
Discharge: HOME | End: 2022-12-19

## 2023-01-01 ENCOUNTER — APPOINTMENT (OUTPATIENT)
Dept: CT IMAGING | Age: 83
DRG: 871 | End: 2023-01-01
Payer: MEDICARE

## 2023-01-01 ENCOUNTER — HOSPITAL ENCOUNTER (INPATIENT)
Age: 83
LOS: 1 days | DRG: 871 | End: 2023-07-18
Attending: EMERGENCY MEDICINE | Admitting: INTERNAL MEDICINE
Payer: MEDICARE

## 2023-01-01 ENCOUNTER — APPOINTMENT (OUTPATIENT)
Dept: GENERAL RADIOLOGY | Age: 83
DRG: 871 | End: 2023-01-01
Payer: MEDICARE

## 2023-01-01 VITALS
BODY MASS INDEX: 22.76 KG/M2 | TEMPERATURE: 101.5 F | SYSTOLIC BLOOD PRESSURE: 111 MMHG | HEIGHT: 67 IN | OXYGEN SATURATION: 95 % | WEIGHT: 145 LBS | DIASTOLIC BLOOD PRESSURE: 70 MMHG | RESPIRATION RATE: 39 BRPM | HEART RATE: 99 BPM

## 2023-01-01 DIAGNOSIS — N17.9 AKI (ACUTE KIDNEY INJURY) (HCC): ICD-10-CM

## 2023-01-01 DIAGNOSIS — T79.6XXA TRAUMATIC RHABDOMYOLYSIS, INITIAL ENCOUNTER (HCC): Primary | ICD-10-CM

## 2023-01-01 DIAGNOSIS — R74.01 TRANSAMINITIS: ICD-10-CM

## 2023-01-01 DIAGNOSIS — D50.9 IRON DEFICIENCY ANEMIA, UNSPECIFIED IRON DEFICIENCY ANEMIA TYPE: ICD-10-CM
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
## 2023-01-01 DIAGNOSIS — E87.20 LACTIC ACIDOSIS: ICD-10-CM

## 2023-01-01 LAB
ALBUMIN SERPL-MCNC: 1.8 G/DL (ref 3.5–5.2)
ALBUMIN SERPL-MCNC: 2.6 G/DL (ref 3.5–5.2)
ALBUMIN SERPL-MCNC: 2.6 G/DL (ref 3.5–5.2)
ALBUMIN SERPL-MCNC: 3.2 G/DL (ref 3.5–5.2)
ALP SERPL-CCNC: 34 U/L (ref 40–129)
ALP SERPL-CCNC: 54 U/L (ref 40–129)
ALP SERPL-CCNC: 62 U/L (ref 40–129)
ALP SERPL-CCNC: 73 U/L (ref 40–129)
ALT SERPL-CCNC: 313 U/L (ref 0–40)
ALT SERPL-CCNC: 326 U/L (ref 0–40)
ALT SERPL-CCNC: 490 U/L (ref 0–40)
ALT SERPL-CCNC: 529 U/L (ref 0–40)
ANION GAP SERPL CALCULATED.3IONS-SCNC: 15 MMOL/L (ref 7–16)
ANION GAP SERPL CALCULATED.3IONS-SCNC: 20 MMOL/L (ref 7–16)
ANION GAP SERPL CALCULATED.3IONS-SCNC: 20 MMOL/L (ref 7–16)
ANION GAP SERPL CALCULATED.3IONS-SCNC: 25 MMOL/L (ref 7–16)
AST SERPL-CCNC: 1052 U/L (ref 0–39)
AST SERPL-CCNC: 730 U/L (ref 0–39)
AST SERPL-CCNC: 765 U/L (ref 0–39)
AST SERPL-CCNC: 993 U/L (ref 0–39)
B-OH-BUTYR SERPL-MCNC: 2.54 MMOL/L (ref 0.02–0.27)
B.E.: -14.3 MMOL/L (ref -3–3)
B.E.: -14.7 MMOL/L (ref -3–3)
B.E.: -16 MMOL/L (ref -3–3)
BASOPHILS # BLD: 0 K/UL (ref 0–0.2)
BASOPHILS # BLD: 0.03 E9/L (ref 0–0.2)
BASOPHILS NFR BLD: 0 % (ref 0–2)
BASOPHILS NFR BLD: 0.8 % (ref 0–2)
BILIRUB SERPL-MCNC: 0.7 MG/DL (ref 0–1.2)
BILIRUB SERPL-MCNC: 1 MG/DL (ref 0–1.2)
BILIRUB SERPL-MCNC: 1.1 MG/DL (ref 0–1.2)
BILIRUB SERPL-MCNC: 1.4 MG/DL (ref 0–1.2)
BUN SERPL-MCNC: 37 MG/DL (ref 6–23)
BUN SERPL-MCNC: 40 MG/DL (ref 6–23)
BUN SERPL-MCNC: 51 MG/DL (ref 6–23)
BUN SERPL-MCNC: 57 MG/DL (ref 6–23)
CALCIUM SERPL-MCNC: 5.2 MG/DL (ref 8.6–10.2)
CALCIUM SERPL-MCNC: 6.7 MG/DL (ref 8.6–10.2)
CALCIUM SERPL-MCNC: 7.2 MG/DL (ref 8.6–10.2)
CALCIUM SERPL-MCNC: 8.4 MG/DL (ref 8.6–10.2)
CHLORIDE SERPL-SCNC: 107 MMOL/L (ref 98–107)
CHLORIDE SERPL-SCNC: 111 MMOL/L (ref 98–107)
CHLORIDE SERPL-SCNC: 113 MMOL/L (ref 98–107)
CHLORIDE SERPL-SCNC: 124 MMOL/L (ref 98–107)
CHLORIDE UR-SCNC: 42 MMOL/L
CK SERPL-CCNC: 2649 U/L (ref 20–200)
CK SERPL-CCNC: 2659 U/L (ref 20–200)
CK SERPL-CCNC: 3628 U/L (ref 20–200)
CO2 SERPL-SCNC: 10 MMOL/L (ref 22–29)
CO2 SERPL-SCNC: 11 MMOL/L (ref 22–29)
CO2 SERPL-SCNC: 13 MMOL/L (ref 22–29)
CO2 SERPL-SCNC: 13 MMOL/L (ref 22–29)
COHB: 1.6 % (ref 0–1.5)
COHB: 2.9 % (ref 0–1.5)
CREAT SERPL-MCNC: 2.2 MG/DL (ref 0.7–1.2)
CREAT SERPL-MCNC: 2.7 MG/DL (ref 0.7–1.2)
CREAT SERPL-MCNC: 3.1 MG/DL (ref 0.7–1.2)
CREAT SERPL-MCNC: 3.3 MG/DL (ref 0.7–1.2)
CREAT UR-MCNC: 78.3 MG/DL (ref 40–278)
CRITICAL: ABNORMAL
DATE ANALYZED: ABNORMAL
DATE OF COLLECTION: ABNORMAL
EKG ATRIAL RATE: 90 BPM
EKG P AXIS: 40 DEGREES
EKG P-R INTERVAL: 214 MS
EKG Q-T INTERVAL: 396 MS
EKG QRS DURATION: 98 MS
EKG QTC CALCULATION (BAZETT): 484 MS
EKG R AXIS: 62 DEGREES
EKG T AXIS: -28 DEGREES
EKG VENTRICULAR RATE: 90 BPM
EOSINOPHIL # BLD: 0 K/UL (ref 0.05–0.5)
EOSINOPHIL # BLD: 0.15 E9/L (ref 0.05–0.5)
EOSINOPHIL NFR BLD: 3.8 % (ref 0–6)
EOSINOPHILS RELATIVE PERCENT: 0 % (ref 0–6)
ERYTHROCYTE [DISTWIDTH] IN BLOOD BY AUTOMATED COUNT: 14.1 FL (ref 11.5–15)
ERYTHROCYTE [DISTWIDTH] IN BLOOD BY AUTOMATED COUNT: 14.6 % (ref 11.5–15)
ERYTHROCYTE [DISTWIDTH] IN BLOOD BY AUTOMATED COUNT: 15 % (ref 11.5–15)
ERYTHROCYTE [DISTWIDTH] IN BLOOD BY AUTOMATED COUNT: 15 % (ref 11.5–15)
FERRITIN SERPL-MCNC: 38 NG/ML
GFR SERPL CREATININE-BSD FRML MDRD: 18 ML/MIN/1.73M2
GFR SERPL CREATININE-BSD FRML MDRD: 19 ML/MIN/1.73M2
GFR SERPL CREATININE-BSD FRML MDRD: 23 ML/MIN/1.73M2
GFR SERPL CREATININE-BSD FRML MDRD: 29 ML/MIN/1.73M2
GLUCOSE SERPL-MCNC: 103 MG/DL (ref 74–99)
GLUCOSE SERPL-MCNC: 124 MG/DL (ref 74–99)
GLUCOSE SERPL-MCNC: 206 MG/DL (ref 74–99)
GLUCOSE SERPL-MCNC: 82 MG/DL (ref 74–99)
HCO3: 12 MMOL/L (ref 22–26)
HCO3: 12.2 MMOL/L (ref 22–26)
HCO3: 13.8 MMOL/L (ref 22–26)
HCT VFR BLD AUTO: 34.5 % (ref 37–54)
HCT VFR BLD AUTO: 35.4 % (ref 37–54)
HCT VFR BLD AUTO: 40.2 % (ref 37–54)
HCT VFR BLD AUTO: 45.9 % (ref 37–54)
HGB BLD-MCNC: 10.7 G/DL (ref 12.5–16.5)
HGB BLD-MCNC: 11.9 G/DL (ref 12.5–16.5)
HGB BLD-MCNC: 13.9 G/DL (ref 12.5–16.5)
HGB BLD-MCNC: 9.9 G/DL (ref 12.5–16.5)
HHB: 32.3 % (ref 0–5)
HHB: 6 % (ref 0–5)
IMM GRANULOCYTES # BLD: 0.01 E9/L
IMM GRANULOCYTES NFR BLD: 0.3 % (ref 0–5)
INR PPP: 1.5
IRON SATN MFR SERPL: 13 % (ref 20–55)
IRON SERPL-MCNC: 48 MCG/DL (ref 59–158)
LAB: ABNORMAL
LACTATE BLDV-SCNC: 2.8 MMOL/L (ref 0.5–2.2)
LACTATE BLDV-SCNC: 4.2 MMOL/L (ref 0.5–2.2)
LACTATE BLDV-SCNC: 6.8 MMOL/L (ref 0.5–1.9)
LACTATE BLDV-SCNC: 9 MMOL/L (ref 0.5–1.9)
LYMPHOCYTES # BLD: 0.8 E9/L (ref 1.5–4)
LYMPHOCYTES # BLD: 2 % (ref 20–42)
LYMPHOCYTES NFR BLD: 0.23 K/UL (ref 1.5–4)
LYMPHOCYTES NFR BLD: 20.4 % (ref 20–42)
Lab: ABNORMAL
MCH RBC QN AUTO: 30.3 PG (ref 26–35)
MCH RBC QN AUTO: 30.5 PG (ref 26–35)
MCH RBC QN AUTO: 30.6 PG (ref 26–35)
MCH RBC QN AUTO: 31.1 PG (ref 26–35)
MCHC RBC AUTO-ENTMCNC: 28.7 G/DL (ref 32–34.5)
MCHC RBC AUTO-ENTMCNC: 29.6 G/DL (ref 32–34.5)
MCHC RBC AUTO-ENTMCNC: 30.2 % (ref 32–34.5)
MCHC RBC AUTO-ENTMCNC: 30.3 G/DL (ref 32–34.5)
MCV RBC AUTO: 100.7 FL (ref 80–99.9)
MCV RBC AUTO: 102.3 FL (ref 80–99.9)
MCV RBC AUTO: 102.9 FL (ref 80–99.9)
MCV RBC AUTO: 106.5 FL (ref 80–99.9)
METAMYELOCYTES ABSOLUTE COUNT: 1.29 K/UL (ref 0–0.12)
METAMYELOCYTES: 10 % (ref 0–1)
METER GLUCOSE: 188 MG/DL (ref 74–99)
METER GLUCOSE: 76 MG/DL (ref 74–99)
METHB: 0.1 % (ref 0–1.5)
METHB: 0.2 % (ref 0–1.5)
MODE: ABNORMAL
MONOCYTES # BLD: 0.43 E9/L (ref 0.1–0.95)
MONOCYTES NFR BLD: 0.35 K/UL (ref 0.1–0.95)
MONOCYTES NFR BLD: 10.9 % (ref 2–12)
MONOCYTES NFR BLD: 3 % (ref 2–12)
MYELOCYTES ABSOLUTE COUNT: 0.35 K/UL
MYELOCYTES: 3 %
NEUTROPHILS # BLD: 2.51 E9/L (ref 1.8–7.3)
NEUTROPHILS NFR BLD: 84 % (ref 43–80)
NEUTS SEG NFR BLD: 11.27 K/UL (ref 1.8–7.3)
NEUTS SEG NFR BLD: 63.8 % (ref 43–80)
NUCLEATED RED BLOOD CELLS: 2 PER 100 WBC
O2 CONTENT: 10.7 ML/DL
O2 CONTENT: 15.9 ML/DL
O2 SATURATION: 66.7 % (ref 92–98.5)
O2 SATURATION: 93.9 % (ref 92–98.5)
O2 SATURATION: 94.7 % (ref 92–98.5)
O2HB: 64.6 % (ref 94–97)
O2HB: 92.3 % (ref 94–97)
OPERATOR ID: 366
OPERATOR ID: 366
OPERATOR ID: 5123
PATIENT TEMP: 37 C
PCO2: 31.3 MMHG (ref 35–45)
PCO2: 36.7 MMHG (ref 35–45)
PCO2: 40.5 MMHG (ref 35–45)
PH BLOOD GAS: 7.14 (ref 7.35–7.45)
PH BLOOD GAS: 7.15 (ref 7.35–7.45)
PH BLOOD GAS: 7.2 (ref 7.35–7.45)
PLATELET # BLD AUTO: 101 K/UL (ref 130–450)
PLATELET # BLD AUTO: 104 K/UL (ref 130–450)
PLATELET # BLD AUTO: 225 E9/L (ref 130–450)
PLATELET # BLD AUTO: 78 K/UL (ref 130–450)
PLATELET CONFIRMATION: NORMAL
PMV BLD AUTO: 11.2 FL (ref 7–12)
PMV BLD AUTO: 11.3 FL (ref 7–12)
PMV BLD AUTO: 11.6 FL (ref 7–12)
PMV BLD AUTO: 9.5 FL (ref 7–12)
PO2: 42.1 MMHG (ref 75–100)
PO2: 91.3 MMHG (ref 75–100)
PO2: 93.1 MMHG (ref 75–100)
POTASSIUM SERPL-SCNC: 3 MMOL/L (ref 3.5–5)
POTASSIUM SERPL-SCNC: 3.7 MMOL/L (ref 3.5–5)
POTASSIUM SERPL-SCNC: 4.5 MMOL/L (ref 3.5–5)
POTASSIUM SERPL-SCNC: 5.1 MMOL/L (ref 3.5–5)
POTASSIUM, UR: 29.7 MMOL/L
PROT SERPL-MCNC: 3.6 G/DL (ref 6.4–8.3)
PROT SERPL-MCNC: 4.9 G/DL (ref 6.4–8.3)
PROT SERPL-MCNC: 5.1 G/DL (ref 6.4–8.3)
PROT SERPL-MCNC: 5.5 G/DL (ref 6.4–8.3)
PROTHROMBIN TIME: 16.6 SEC (ref 9.3–12.4)
RBC # BLD AUTO: 3.24 M/UL (ref 3.8–5.8)
RBC # BLD AUTO: 3.44 E12/L (ref 3.8–5.8)
RBC # BLD AUTO: 3.93 M/UL (ref 3.8–5.8)
RBC # BLD AUTO: 4.56 M/UL (ref 3.8–5.8)
SARS-COV-2 RDRP RESP QL NAA+PROBE: NOT DETECTED
SODIUM SERPL-SCNC: 144 MMOL/L (ref 132–146)
SODIUM SERPL-SCNC: 144 MMOL/L (ref 132–146)
SODIUM SERPL-SCNC: 145 MMOL/L (ref 132–146)
SODIUM SERPL-SCNC: 149 MMOL/L (ref 132–146)
SODIUM UR-SCNC: 46 MMOL/L
SOURCE, BLOOD GAS: ABNORMAL
SPECIMEN DESCRIPTION: NORMAL
THB: 11.8 G/DL (ref 11.5–16.5)
THB: 12.2 G/DL (ref 11.5–16.5)
TIBC SERPL-MCNC: 365 MCG/DL (ref 250–450)
TIME ANALYZED: 1307
TIME ANALYZED: 1709
TIME ANALYZED: 742
TRANSFERRIN SERPL-MCNC: 302 MG/DL (ref 200–360)
TROPONIN I SERPL HS-MCNC: 68 NG/L (ref 0–11)
TROPONIN I SERPL HS-MCNC: 86 NG/L (ref 0–11)
TROPONIN I SERPL HS-MCNC: 99 NG/L (ref 0–11)
TSH SERPL DL<=0.05 MIU/L-ACNC: 5.23 UIU/ML (ref 0.27–4.2)
UUN UR-MCNC: 134 MG/DL (ref 800–1666)
WBC # BLD: 3.9 E9/L (ref 4.5–11.5)
WBC OTHER # BLD: 10.3 K/UL (ref 4.5–11.5)
WBC OTHER # BLD: 13.5 K/UL (ref 4.5–11.5)
WBC OTHER # BLD: 14.2 K/UL (ref 4.5–11.5)

## 2023-01-01 PROCEDURE — 72125 CT NECK SPINE W/O DYE: CPT

## 2023-01-01 PROCEDURE — 99292 CRITICAL CARE ADDL 30 MIN: CPT | Performed by: INTERNAL MEDICINE

## 2023-01-01 PROCEDURE — 83605 ASSAY OF LACTIC ACID: CPT

## 2023-01-01 PROCEDURE — 82570 ASSAY OF URINE CREATININE: CPT

## 2023-01-01 PROCEDURE — 93010 ELECTROCARDIOGRAM REPORT: CPT | Performed by: INTERNAL MEDICINE

## 2023-01-01 PROCEDURE — 6370000000 HC RX 637 (ALT 250 FOR IP): Performed by: INTERNAL MEDICINE

## 2023-01-01 PROCEDURE — 96360 HYDRATION IV INFUSION INIT: CPT

## 2023-01-01 PROCEDURE — 2580000003 HC RX 258

## 2023-01-01 PROCEDURE — 71045 X-RAY EXAM CHEST 1 VIEW: CPT

## 2023-01-01 PROCEDURE — 84133 ASSAY OF URINE POTASSIUM: CPT

## 2023-01-01 PROCEDURE — 82803 BLOOD GASES ANY COMBINATION: CPT

## 2023-01-01 PROCEDURE — 2580000003 HC RX 258: Performed by: INTERNAL MEDICINE

## 2023-01-01 PROCEDURE — 6360000004 HC RX CONTRAST MEDICATION: Performed by: RADIOLOGY

## 2023-01-01 PROCEDURE — 82436 ASSAY OF URINE CHLORIDE: CPT

## 2023-01-01 PROCEDURE — 84484 ASSAY OF TROPONIN QUANT: CPT

## 2023-01-01 PROCEDURE — 87040 BLOOD CULTURE FOR BACTERIA: CPT

## 2023-01-01 PROCEDURE — 80053 COMPREHEN METABOLIC PANEL: CPT

## 2023-01-01 PROCEDURE — 82550 ASSAY OF CK (CPK): CPT

## 2023-01-01 PROCEDURE — 85610 PROTHROMBIN TIME: CPT

## 2023-01-01 PROCEDURE — 96361 HYDRATE IV INFUSION ADD-ON: CPT

## 2023-01-01 PROCEDURE — 85027 COMPLETE CBC AUTOMATED: CPT

## 2023-01-01 PROCEDURE — 2580000003 HC RX 258: Performed by: EMERGENCY MEDICINE

## 2023-01-01 PROCEDURE — 70450 CT HEAD/BRAIN W/O DYE: CPT

## 2023-01-01 PROCEDURE — 9900000038 HC CARDIAC REHAB PHASE 3 - MONTHLY

## 2023-01-01 PROCEDURE — 05HM33Z INSERTION OF INFUSION DEVICE INTO RIGHT INTERNAL JUGULAR VEIN, PERCUTANEOUS APPROACH: ICD-10-PCS | Performed by: INTERNAL MEDICINE

## 2023-01-01 PROCEDURE — 84443 ASSAY THYROID STIM HORMONE: CPT

## 2023-01-01 PROCEDURE — 84540 ASSAY OF URINE/UREA-N: CPT

## 2023-01-01 PROCEDURE — 36592 COLLECT BLOOD FROM PICC: CPT

## 2023-01-01 PROCEDURE — 2500000003 HC RX 250 WO HCPCS: Performed by: EMERGENCY MEDICINE

## 2023-01-01 PROCEDURE — 99285 EMERGENCY DEPT VISIT HI MDM: CPT

## 2023-01-01 PROCEDURE — 82805 BLOOD GASES W/O2 SATURATION: CPT

## 2023-01-01 PROCEDURE — 6360000002 HC RX W HCPCS: Performed by: INTERNAL MEDICINE

## 2023-01-01 PROCEDURE — 2500000003 HC RX 250 WO HCPCS: Performed by: INTERNAL MEDICINE

## 2023-01-01 PROCEDURE — 6360000002 HC RX W HCPCS

## 2023-01-01 PROCEDURE — 99291 CRITICAL CARE FIRST HOUR: CPT | Performed by: INTERNAL MEDICINE

## 2023-01-01 PROCEDURE — 87086 URINE CULTURE/COLONY COUNT: CPT

## 2023-01-01 PROCEDURE — 84300 ASSAY OF URINE SODIUM: CPT

## 2023-01-01 PROCEDURE — 6370000000 HC RX 637 (ALT 250 FOR IP)

## 2023-01-01 PROCEDURE — 84145 PROCALCITONIN (PCT): CPT

## 2023-01-01 PROCEDURE — 87077 CULTURE AEROBIC IDENTIFY: CPT

## 2023-01-01 PROCEDURE — 2700000000 HC OXYGEN THERAPY PER DAY

## 2023-01-01 PROCEDURE — 2000000000 HC ICU R&B

## 2023-01-01 PROCEDURE — 87635 SARS-COV-2 COVID-19 AMP PRB: CPT

## 2023-01-01 PROCEDURE — 82010 KETONE BODYS QUAN: CPT

## 2023-01-01 PROCEDURE — 87088 URINE BACTERIA CULTURE: CPT

## 2023-01-01 PROCEDURE — 93005 ELECTROCARDIOGRAM TRACING: CPT | Performed by: EMERGENCY MEDICINE

## 2023-01-01 PROCEDURE — 74177 CT ABD & PELVIS W/CONTRAST: CPT

## 2023-01-01 PROCEDURE — 71275 CT ANGIOGRAPHY CHEST: CPT

## 2023-01-01 PROCEDURE — 82947 ASSAY GLUCOSE BLOOD QUANT: CPT

## 2023-01-01 RX ORDER — POLYETHYLENE GLYCOL 3350 17 G/17G
17 POWDER, FOR SOLUTION ORAL DAILY PRN
Status: DISCONTINUED | OUTPATIENT
Start: 2023-01-01 | End: 2023-01-01

## 2023-01-01 RX ORDER — 0.9 % SODIUM CHLORIDE 0.9 %
1000 INTRAVENOUS SOLUTION INTRAVENOUS ONCE
Status: COMPLETED | OUTPATIENT
Start: 2023-01-01 | End: 2023-01-01

## 2023-01-01 RX ORDER — SODIUM CHLORIDE 0.9 % (FLUSH) 0.9 %
5-40 SYRINGE (ML) INJECTION PRN
Status: DISCONTINUED | OUTPATIENT
Start: 2023-01-01 | End: 2023-01-01

## 2023-01-01 RX ORDER — SODIUM CHLORIDE 0.9 % (FLUSH) 0.9 %
5-40 SYRINGE (ML) INJECTION EVERY 12 HOURS SCHEDULED
Status: DISCONTINUED | OUTPATIENT
Start: 2023-01-01 | End: 2023-01-01 | Stop reason: HOSPADM

## 2023-01-01 RX ORDER — SODIUM CHLORIDE 9 MG/ML
INJECTION, SOLUTION INTRAVENOUS PRN
Status: DISCONTINUED | OUTPATIENT
Start: 2023-01-01 | End: 2023-01-01

## 2023-01-01 RX ORDER — LORAZEPAM 2 MG/ML
1 INJECTION INTRAMUSCULAR EVERY 6 HOURS PRN
Status: DISCONTINUED | OUTPATIENT
Start: 2023-01-01 | End: 2023-01-01 | Stop reason: HOSPADM

## 2023-01-01 RX ORDER — INSULIN LISPRO 100 [IU]/ML
0-4 INJECTION, SOLUTION INTRAVENOUS; SUBCUTANEOUS
Status: DISCONTINUED | OUTPATIENT
Start: 2023-01-01 | End: 2023-01-01

## 2023-01-01 RX ORDER — ONDANSETRON 2 MG/ML
4 INJECTION INTRAMUSCULAR; INTRAVENOUS EVERY 6 HOURS PRN
Status: DISCONTINUED | OUTPATIENT
Start: 2023-01-01 | End: 2023-01-01 | Stop reason: HOSPADM

## 2023-01-01 RX ORDER — GLYCOPYRROLATE 0.2 MG/ML
0.2 INJECTION INTRAMUSCULAR; INTRAVENOUS EVERY 4 HOURS PRN
Status: DISCONTINUED | OUTPATIENT
Start: 2023-01-01 | End: 2023-01-01 | Stop reason: HOSPADM

## 2023-01-01 RX ORDER — SODIUM CHLORIDE 0.9 % (FLUSH) 0.9 %
5-40 SYRINGE (ML) INJECTION EVERY 12 HOURS SCHEDULED
Status: DISCONTINUED | OUTPATIENT
Start: 2023-01-01 | End: 2023-01-01

## 2023-01-01 RX ORDER — POTASSIUM CHLORIDE 29.8 MG/ML
40 INJECTION INTRAVENOUS ONCE
Status: COMPLETED | OUTPATIENT
Start: 2023-01-01 | End: 2023-01-01

## 2023-01-01 RX ORDER — MORPHINE SULFATE 20 MG/ML
5 SOLUTION ORAL
Status: DISCONTINUED | OUTPATIENT
Start: 2023-01-01 | End: 2023-01-01 | Stop reason: HOSPADM

## 2023-01-01 RX ORDER — ONDANSETRON 4 MG/1
4 TABLET, ORALLY DISINTEGRATING ORAL EVERY 8 HOURS PRN
Status: DISCONTINUED | OUTPATIENT
Start: 2023-01-01 | End: 2023-01-01 | Stop reason: HOSPADM

## 2023-01-01 RX ORDER — SODIUM CHLORIDE 9 MG/ML
INJECTION, SOLUTION INTRAVENOUS CONTINUOUS
Status: DISCONTINUED | OUTPATIENT
Start: 2023-01-01 | End: 2023-01-01

## 2023-01-01 RX ORDER — TAMSULOSIN HYDROCHLORIDE 0.4 MG/1
0.4 CAPSULE ORAL DAILY
Status: DISCONTINUED | OUTPATIENT
Start: 2023-01-01 | End: 2023-01-01 | Stop reason: HOSPADM

## 2023-01-01 RX ORDER — SODIUM CHLORIDE 0.9 % (FLUSH) 0.9 %
5-40 SYRINGE (ML) INJECTION PRN
Status: DISCONTINUED | OUTPATIENT
Start: 2023-01-01 | End: 2023-01-01 | Stop reason: HOSPADM

## 2023-01-01 RX ORDER — ONDANSETRON 4 MG/1
4 TABLET, ORALLY DISINTEGRATING ORAL EVERY 8 HOURS PRN
Status: DISCONTINUED | OUTPATIENT
Start: 2023-01-01 | End: 2023-01-01

## 2023-01-01 RX ORDER — PANTOPRAZOLE SODIUM 40 MG/1
40 TABLET, DELAYED RELEASE ORAL
Status: DISCONTINUED | OUTPATIENT
Start: 2023-07-19 | End: 2023-01-01

## 2023-01-01 RX ORDER — ONDANSETRON 2 MG/ML
4 INJECTION INTRAMUSCULAR; INTRAVENOUS EVERY 6 HOURS PRN
Status: DISCONTINUED | OUTPATIENT
Start: 2023-01-01 | End: 2023-01-01

## 2023-01-01 RX ORDER — TOBRAMYCIN 40 MG/ML
300 INJECTION INTRAMUSCULAR; INTRAVENOUS EVERY 12 HOURS
Status: CANCELLED | OUTPATIENT
Start: 2023-01-01

## 2023-01-01 RX ORDER — ENOXAPARIN SODIUM 100 MG/ML
30 INJECTION SUBCUTANEOUS DAILY
Status: DISCONTINUED | OUTPATIENT
Start: 2023-01-01 | End: 2023-01-01

## 2023-01-01 RX ORDER — INSULIN LISPRO 100 [IU]/ML
0-4 INJECTION, SOLUTION INTRAVENOUS; SUBCUTANEOUS NIGHTLY
Status: DISCONTINUED | OUTPATIENT
Start: 2023-01-01 | End: 2023-01-01

## 2023-01-01 RX ORDER — NOREPINEPHRINE BITARTRATE 0.06 MG/ML
1-100 INJECTION, SOLUTION INTRAVENOUS CONTINUOUS
Status: DISCONTINUED | OUTPATIENT
Start: 2023-01-01 | End: 2023-01-01

## 2023-01-01 RX ORDER — DEXTROSE MONOHYDRATE 100 MG/ML
INJECTION, SOLUTION INTRAVENOUS CONTINUOUS PRN
Status: DISCONTINUED | OUTPATIENT
Start: 2023-01-01 | End: 2023-01-01 | Stop reason: HOSPADM

## 2023-01-01 RX ORDER — 0.9 % SODIUM CHLORIDE 0.9 %
500 INTRAVENOUS SOLUTION INTRAVENOUS ONCE
Status: COMPLETED | OUTPATIENT
Start: 2023-01-01 | End: 2023-01-01

## 2023-01-01 RX ORDER — SODIUM CHLORIDE 9 MG/ML
INJECTION, SOLUTION INTRAVENOUS PRN
Status: DISCONTINUED | OUTPATIENT
Start: 2023-01-01 | End: 2023-01-01 | Stop reason: HOSPADM

## 2023-01-01 RX ORDER — ACETAMINOPHEN 325 MG/1
650 TABLET ORAL EVERY 4 HOURS PRN
Status: DISCONTINUED | OUTPATIENT
Start: 2023-01-01 | End: 2023-01-01 | Stop reason: HOSPADM

## 2023-01-01 RX ORDER — ASPIRIN 81 MG/1
81 TABLET ORAL DAILY
Status: DISCONTINUED | OUTPATIENT
Start: 2023-01-01 | End: 2023-01-01

## 2023-01-01 RX ORDER — 0.9 % SODIUM CHLORIDE 0.9 %
2000 INTRAVENOUS SOLUTION INTRAVENOUS ONCE
Status: COMPLETED | OUTPATIENT
Start: 2023-01-01 | End: 2023-01-01

## 2023-01-01 RX ADMIN — IOPAMIDOL 75 ML: 755 INJECTION, SOLUTION INTRAVENOUS at 20:44

## 2023-01-01 RX ADMIN — Medication 5 MCG/MIN: at 07:31

## 2023-01-01 RX ADMIN — ASPIRIN 81 MG: 81 TABLET, COATED ORAL at 11:19

## 2023-01-01 RX ADMIN — SODIUM CHLORIDE 500 ML: 9 INJECTION, SOLUTION INTRAVENOUS at 07:04

## 2023-01-01 RX ADMIN — POTASSIUM CHLORIDE 40 MEQ: 400 INJECTION, SOLUTION INTRAVENOUS at 12:52

## 2023-01-01 RX ADMIN — TAMSULOSIN HYDROCHLORIDE 0.4 MG: 0.4 CAPSULE ORAL at 11:19

## 2023-01-01 RX ADMIN — SODIUM CHLORIDE: 9 INJECTION, SOLUTION INTRAVENOUS at 22:02

## 2023-01-01 RX ADMIN — VANCOMYCIN HYDROCHLORIDE 1250 MG: 10 INJECTION, POWDER, LYOPHILIZED, FOR SOLUTION INTRAVENOUS at 13:51

## 2023-01-01 RX ADMIN — SODIUM CHLORIDE, PRESERVATIVE FREE 10 ML: 5 INJECTION INTRAVENOUS at 10:28

## 2023-01-01 RX ADMIN — SODIUM CHLORIDE 1000 ML: 9 INJECTION, SOLUTION INTRAVENOUS at 02:35

## 2023-01-01 RX ADMIN — MEROPENEM 1000 MG: 1 INJECTION, POWDER, FOR SOLUTION INTRAVENOUS at 15:54

## 2023-01-01 RX ADMIN — SODIUM CHLORIDE 1000 ML: 9 INJECTION, SOLUTION INTRAVENOUS at 22:06

## 2023-01-01 RX ADMIN — SODIUM BICARBONATE: 84 INJECTION, SOLUTION INTRAVENOUS at 10:58

## 2023-01-01 RX ADMIN — SODIUM CHLORIDE 2000 ML: 9 INJECTION, SOLUTION INTRAVENOUS at 19:28

## 2023-01-01 RX ADMIN — SODIUM CHLORIDE, PRESERVATIVE FREE 10 ML: 5 INJECTION INTRAVENOUS at 22:02

## 2023-01-01 RX ADMIN — Medication 10 ML: at 23:08

## 2023-01-01 RX ADMIN — HYDROCORTISONE SODIUM SUCCINATE 50 MG: 100 INJECTION, POWDER, FOR SOLUTION INTRAMUSCULAR; INTRAVENOUS at 16:31

## 2023-01-01 ASSESSMENT — PAIN - FUNCTIONAL ASSESSMENT: PAIN_FUNCTIONAL_ASSESSMENT: NONE - DENIES PAIN

## 2023-01-01 ASSESSMENT — ENCOUNTER SYMPTOMS
SHORTNESS OF BREATH: 0
ABDOMINAL PAIN: 0
BACK PAIN: 0
BLOOD IN STOOL: 0
DIARRHEA: 0
VOMITING: 0
COUGH: 0
NAUSEA: 0
CONSTIPATION: 0

## 2023-01-01 ASSESSMENT — PAIN SCALES - GENERAL
PAINLEVEL_OUTOF10: 0
PAINLEVEL_OUTOF10: 0

## 2023-01-05 ENCOUNTER — OFFICE VISIT (OUTPATIENT)
Dept: PRIMARY CARE CLINIC | Age: 83
End: 2023-01-05
Payer: MEDICARE

## 2023-01-05 VITALS
WEIGHT: 148 LBS | BODY MASS INDEX: 23.18 KG/M2 | DIASTOLIC BLOOD PRESSURE: 62 MMHG | TEMPERATURE: 97.5 F | OXYGEN SATURATION: 95 % | SYSTOLIC BLOOD PRESSURE: 120 MMHG | HEART RATE: 62 BPM

## 2023-01-05 DIAGNOSIS — I25.119 CORONARY ARTERY DISEASE INVOLVING NATIVE CORONARY ARTERY OF NATIVE HEART WITH ANGINA PECTORIS (HCC): ICD-10-CM

## 2023-01-05 DIAGNOSIS — E11.9 TYPE 2 DIABETES MELLITUS WITHOUT COMPLICATION, WITHOUT LONG-TERM CURRENT USE OF INSULIN (HCC): Chronic | ICD-10-CM

## 2023-01-05 DIAGNOSIS — E11.22 TYPE 2 DIABETES MELLITUS WITH STAGE 3A CHRONIC KIDNEY DISEASE, WITH LONG-TERM CURRENT USE OF INSULIN (HCC): ICD-10-CM

## 2023-01-05 DIAGNOSIS — N18.31 TYPE 2 DIABETES MELLITUS WITH STAGE 3A CHRONIC KIDNEY DISEASE, WITH LONG-TERM CURRENT USE OF INSULIN (HCC): ICD-10-CM

## 2023-01-05 DIAGNOSIS — N18.31 STAGE 3A CHRONIC KIDNEY DISEASE (HCC): ICD-10-CM

## 2023-01-05 DIAGNOSIS — Z79.4 TYPE 2 DIABETES MELLITUS WITH STAGE 3A CHRONIC KIDNEY DISEASE, WITH LONG-TERM CURRENT USE OF INSULIN (HCC): ICD-10-CM

## 2023-01-05 DIAGNOSIS — E11.9 TYPE 2 DIABETES MELLITUS WITHOUT COMPLICATION, WITHOUT LONG-TERM CURRENT USE OF INSULIN (HCC): Primary | Chronic | ICD-10-CM

## 2023-01-05 DIAGNOSIS — C61 PROSTATE CANCER (HCC): ICD-10-CM

## 2023-01-05 LAB
ALBUMIN SERPL-MCNC: 4.2 G/DL (ref 3.5–5.2)
ALP BLD-CCNC: 36 U/L (ref 40–129)
ALT SERPL-CCNC: 28 U/L (ref 0–40)
ANION GAP SERPL CALCULATED.3IONS-SCNC: 12 MMOL/L (ref 7–16)
AST SERPL-CCNC: 35 U/L (ref 0–39)
BILIRUB SERPL-MCNC: 0.6 MG/DL (ref 0–1.2)
BUN BLDV-MCNC: 44 MG/DL (ref 6–23)
CALCIUM SERPL-MCNC: 9.6 MG/DL (ref 8.6–10.2)
CHLORIDE BLD-SCNC: 110 MMOL/L (ref 98–107)
CHOLESTEROL, TOTAL: 136 MG/DL (ref 0–199)
CO2: 22 MMOL/L (ref 22–29)
CREAT SERPL-MCNC: 1.5 MG/DL (ref 0.7–1.2)
CREATININE URINE: 87 MG/DL (ref 40–278)
GFR SERPL CREATININE-BSD FRML MDRD: 46 ML/MIN/1.73
GLUCOSE BLD-MCNC: 107 MG/DL (ref 74–99)
HBA1C MFR BLD: 5.8 % (ref 4–5.6)
HCT VFR BLD CALC: 41.7 % (ref 37–54)
HDLC SERPL-MCNC: 33 MG/DL
HEMOGLOBIN: 12.8 G/DL (ref 12.5–16.5)
LDL CHOLESTEROL CALCULATED: 75 MG/DL (ref 0–99)
MCH RBC QN AUTO: 31.6 PG (ref 26–35)
MCHC RBC AUTO-ENTMCNC: 30.7 % (ref 32–34.5)
MCV RBC AUTO: 103 FL (ref 80–99.9)
MICROALBUMIN UR-MCNC: 15.6 MG/L
MICROALBUMIN/CREAT UR-RTO: 17.9 (ref 0–30)
PDW BLD-RTO: 14.6 FL (ref 11.5–15)
PLATELET # BLD: 246 E9/L (ref 130–450)
PMV BLD AUTO: 9.4 FL (ref 7–12)
POTASSIUM SERPL-SCNC: 5.2 MMOL/L (ref 3.5–5)
RBC # BLD: 4.05 E12/L (ref 3.8–5.8)
SODIUM BLD-SCNC: 144 MMOL/L (ref 132–146)
TOTAL PROTEIN: 6.7 G/DL (ref 6.4–8.3)
TRIGL SERPL-MCNC: 138 MG/DL (ref 0–149)
VLDLC SERPL CALC-MCNC: 28 MG/DL
WBC # BLD: 5.4 E9/L (ref 4.5–11.5)

## 2023-01-05 PROCEDURE — 3078F DIAST BP <80 MM HG: CPT | Performed by: FAMILY MEDICINE

## 2023-01-05 PROCEDURE — 1123F ACP DISCUSS/DSCN MKR DOCD: CPT | Performed by: FAMILY MEDICINE

## 2023-01-05 PROCEDURE — G8427 DOCREV CUR MEDS BY ELIG CLIN: HCPCS | Performed by: FAMILY MEDICINE

## 2023-01-05 PROCEDURE — G8420 CALC BMI NORM PARAMETERS: HCPCS | Performed by: FAMILY MEDICINE

## 2023-01-05 PROCEDURE — 3074F SYST BP LT 130 MM HG: CPT | Performed by: FAMILY MEDICINE

## 2023-01-05 PROCEDURE — 1036F TOBACCO NON-USER: CPT | Performed by: FAMILY MEDICINE

## 2023-01-05 PROCEDURE — G8484 FLU IMMUNIZE NO ADMIN: HCPCS | Performed by: FAMILY MEDICINE

## 2023-01-05 PROCEDURE — 99214 OFFICE O/P EST MOD 30 MIN: CPT | Performed by: FAMILY MEDICINE

## 2023-01-05 RX ORDER — BLOOD-GLUCOSE METER
1 KIT MISCELLANEOUS DAILY
Qty: 50 EACH | Refills: 5 | Status: SHIPPED | OUTPATIENT
Start: 2023-01-05

## 2023-01-05 ASSESSMENT — ENCOUNTER SYMPTOMS
BLURRED VISION: 0
CHEST TIGHTNESS: 0
VISUAL CHANGE: 0
SHORTNESS OF BREATH: 0

## 2023-01-05 ASSESSMENT — PATIENT HEALTH QUESTIONNAIRE - PHQ9
1. LITTLE INTEREST OR PLEASURE IN DOING THINGS: 0
SUM OF ALL RESPONSES TO PHQ QUESTIONS 1-9: 0
2. FEELING DOWN, DEPRESSED OR HOPELESS: 0
SUM OF ALL RESPONSES TO PHQ QUESTIONS 1-9: 0
SUM OF ALL RESPONSES TO PHQ9 QUESTIONS 1 & 2: 0
SUM OF ALL RESPONSES TO PHQ QUESTIONS 1-9: 0
SUM OF ALL RESPONSES TO PHQ QUESTIONS 1-9: 0

## 2023-01-05 NOTE — PROGRESS NOTES
Jonetta Angelucci, a male of 81 y.o. came to the office 1/5/2023. Patient Active Problem List   Diagnosis    Hypertension    Hyperlipidemia    CAD (coronary artery disease)    Esophageal stricture    GERD (gastroesophageal reflux disease)    Type 2 diabetes mellitus without complication, without long-term current use of insulin (HCC)    Gallstone pancreatitis    Pancreatitis, unspecified pancreatitis type    Hepatic steatosis    Bilateral renal cysts    Cystic disease of liver    History of ERCP    Pancreatic duct stones    Prostate cancer ([facility] [address]    AAA (abdominal aortic aneurysm) without rupture    S/P AAA repair using bifurcation graft    S/P femoral-femoral bypass surgery    Chronic renal disease, stage III ([facility] [address] [745991]    Type 2 diabetes mellitus with chronic kidney disease          Diabetes  He presents for his follow-up diabetic visit. He has type 2 diabetes mellitus. His disease course has been stable. Associated symptoms include fatigue. Pertinent negatives for diabetes include no blurred vision, no chest pain, no foot paresthesias, no foot ulcerations, no polydipsia, no polyuria, no visual change, no weakness and no weight loss. Symptoms are stable. Current diabetic treatment includes oral agent (triple therapy). He is compliant with treatment all of the time. His weight is stable. He is following a generally healthy diet. There is no change in his home blood glucose trend. His breakfast blood glucose range is generally  mg/dl. An ACE inhibitor/angiotensin II receptor blocker is being taken. Coronary Artery Disease  Presents for follow-up visit. Pertinent negatives include no chest pain, chest pressure, chest tightness, leg swelling, muscle weakness, palpitations, shortness of breath or weight gain. The symptoms have been resolved. Compliance with diet is good. Compliance with exercise is good. Compliance with medications is good.       Prostate ca: having effects of radiation with trouble monitoring urination. Gets urgency. On Azo. Ckd: on meds. Allergies   Allergen Reactions    Pcn [Penicillins] Swelling     Hives, swelling when 10year old. Grass Extracts [Gramineae Pollens]      roses    Percocet [Oxycodone-Acetaminophen] Other (See Comments)     dizzy       Current Outpatient Medications on File Prior to Visit   Medication Sig Dispense Refill    isosorbide mononitrate (IMDUR) 30 MG extended release tablet TAKE 1 TABLET EVERY DAY 90 tablet 1    fenofibrate micronized (LOFIBRA) 134 MG capsule TAKE 1 CAPSULE EVERY DAY 90 capsule 1    losartan (COZAAR) 100 MG tablet TAKE 1 TABLET EVERY MORNING 90 tablet 1    metoprolol succinate (TOPROL XL) 50 MG extended release tablet TAKE 1 TABLET AT BEDTIME 90 tablet 1    Phenazopyridine HCl (AZO TABS PO) Take by mouth      glimepiride (AMARYL) 2 MG tablet TAKE 1 TABLET EVERY MORNING 90 tablet 1    empagliflozin (JARDIANCE) 10 MG tablet TAKE 1 TABLET EVERY DAY 90 tablet 1    metFORMIN (GLUCOPHAGE) 1000 MG tablet TAKE 1 TABLET TWICE DAILY WITH MEALS 180 tablet 1    simvastatin (ZOCOR) 40 MG tablet TAKE 1 TABLET EVERY NIGHT 90 tablet 1    tamsulosin (FLOMAX) 0.4 MG capsule 0.4 mg      oxybutynin (DITROPAN) 5 MG tablet  (Patient not taking: Reported on 9/13/2022)      FreeStyle Lancets MISC use to check blood sugar daily 100 each 0    Multiple Vitamins-Minerals (CENTRUM SILVER 50+MEN PO) Take by mouth daily       lipase-protease-amylase (CREON) 60275-91107 units delayed release capsule Take 1 capsule by mouth 3 times daily (with meals) (Patient not taking: Reported on 9/13/2022) 250 capsule 5    aspirin 81 MG EC tablet Take 81 mg by mouth daily        No current facility-administered medications on file prior to visit. Review of Systems   Constitutional:  Positive for fatigue. Negative for weight gain and weight loss. Eyes:  Negative for blurred vision. Respiratory:  Negative for chest tightness and shortness of breath.     Cardiovascular: Negative for chest pain, palpitations and leg swelling. Endocrine: Negative for polydipsia and polyuria. Musculoskeletal:  Negative for muscle weakness. Neurological:  Negative for weakness. other review of systems reviewed and are negative    OBJECTIVE:  /62   Pulse 62   Temp 97.5 °F (36.4 °C)   Wt 148 lb (67.1 kg)   SpO2 95%   BMI 23.18 kg/m²      Physical Exam  Vitals reviewed. Eyes:      General: No scleral icterus. Comments: Pale conjunctiva   Neck:      Thyroid: No thyromegaly. Vascular: No carotid bruit. Cardiovascular:      Rate and Rhythm: Normal rate and regular rhythm. Heart sounds: No murmur heard. Pulmonary:      Effort: Pulmonary effort is normal.      Breath sounds: Normal breath sounds. No wheezing or rales. Abdominal:      General: Bowel sounds are normal.      Palpations: Abdomen is soft. There is no mass. Tenderness: There is no abdominal tenderness. There is no guarding or rebound. Musculoskeletal:         General: Normal range of motion. Cervical back: Neck supple. Lymphadenopathy:      Cervical: No cervical adenopathy. Skin:     General: Skin is warm and dry. Neurological:      Mental Status: He is alert and oriented to person, place, and time. Psychiatric:         Mood and Affect: Mood normal.       ASSESSMENT AND PLAN:    Camilo Young was seen today for diabetes. Diagnoses and all orders for this visit:    Type 2 diabetes mellitus without complication, without long-term current use of insulin ([facility])  -     blood glucose test strips (FREESTYLE LITE) strip; 1 each by Other route daily As needed. -     Comprehensive Metabolic Panel; Future  -     Hemoglobin A1C; Future  -     Microalbumin / Creatinine Urine Ratio; Future    Coronary artery disease involving native coronary artery of native heart with angina pectoris ([facility] [address]  -     CBC; Future  -     Lipid Panel;  Future    Prostate cancer ([facility] [address]    Stage 3a chronic kidney disease ([facility] [address]    Type 2 diabetes mellitus with stage 3a chronic kidney disease, with long-term current use of insulin ([address]    - continue cardiac rehab  - low carb and chol   - consider increasing zocor to 80 mg if ldl > 70 with eventual change to different statin. - follow up with urology/oncology as scheduled. Return in about 4 months (around 5/5/2023) for or for acute problem.     Jen Velázquez, DO

## 2023-01-16 PROCEDURE — 9900000038 HC CARDIAC REHAB PHASE 3 - MONTHLY

## 2023-01-30 ENCOUNTER — HOSPITAL ENCOUNTER (OUTPATIENT)
Dept: CARDIAC REHAB | Age: 83
Discharge: HOME | End: 2023-01-30

## 2023-02-20 DIAGNOSIS — E11.9 TYPE 2 DIABETES MELLITUS WITHOUT COMPLICATION, WITHOUT LONG-TERM CURRENT USE OF INSULIN (HCC): ICD-10-CM

## 2023-02-20 RX ORDER — SIMVASTATIN 40 MG
TABLET ORAL
Qty: 90 TABLET | Refills: 1 | Status: SHIPPED | OUTPATIENT
Start: 2023-02-20

## 2023-02-20 RX ORDER — GLIMEPIRIDE 2 MG/1
TABLET ORAL
Qty: 90 TABLET | Refills: 1 | Status: SHIPPED | OUTPATIENT
Start: 2023-02-20

## 2023-02-21 PROCEDURE — 9900000038 HC CARDIAC REHAB PHASE 3 - MONTHLY

## 2023-02-27 ENCOUNTER — HOSPITAL ENCOUNTER (OUTPATIENT)
Dept: CARDIAC REHAB | Age: 83
Discharge: HOME | End: 2023-02-27

## 2023-03-09 PROCEDURE — 9900000038 HC CARDIAC REHAB PHASE 3 - MONTHLY

## 2023-03-13 ENCOUNTER — TELEPHONE (OUTPATIENT)
Dept: VASCULAR SURGERY | Age: 83
End: 2023-03-13

## 2023-03-13 DIAGNOSIS — I10 ESSENTIAL HYPERTENSION: ICD-10-CM

## 2023-03-13 RX ORDER — LOSARTAN POTASSIUM 100 MG/1
TABLET ORAL
Qty: 90 TABLET | Refills: 0 | Status: SHIPPED | OUTPATIENT
Start: 2023-03-13

## 2023-03-14 ENCOUNTER — OFFICE VISIT (OUTPATIENT)
Dept: VASCULAR SURGERY | Age: 83
End: 2023-03-14
Payer: MEDICARE

## 2023-03-14 ENCOUNTER — HOSPITAL ENCOUNTER (OUTPATIENT)
Dept: CARDIOLOGY | Age: 83
Discharge: HOME | End: 2023-03-14
Payer: MEDICARE

## 2023-03-14 VITALS — WEIGHT: 148 LBS | BODY MASS INDEX: 23.23 KG/M2 | HEIGHT: 67 IN

## 2023-03-14 DIAGNOSIS — Z86.79 S/P AAA REPAIR USING BIFURCATION GRAFT: ICD-10-CM

## 2023-03-14 DIAGNOSIS — I71.43 INFRARENAL ABDOMINAL AORTIC ANEURYSM (AAA) WITHOUT RUPTURE: Primary | ICD-10-CM

## 2023-03-14 DIAGNOSIS — Z95.828 S/P AAA REPAIR USING BIFURCATION GRAFT: ICD-10-CM

## 2023-03-14 PROCEDURE — 1036F TOBACCO NON-USER: CPT

## 2023-03-14 PROCEDURE — 93978 VASCULAR STUDY: CPT

## 2023-03-14 PROCEDURE — G8420 CALC BMI NORM PARAMETERS: HCPCS

## 2023-03-14 PROCEDURE — 1123F ACP DISCUSS/DSCN MKR DOCD: CPT

## 2023-03-14 PROCEDURE — G8484 FLU IMMUNIZE NO ADMIN: HCPCS

## 2023-03-14 PROCEDURE — 99213 OFFICE O/P EST LOW 20 MIN: CPT

## 2023-03-14 PROCEDURE — G8427 DOCREV CUR MEDS BY ELIG CLIN: HCPCS

## 2023-03-14 NOTE — PROGRESS NOTES
Vascular Surgery Outpatient Progress Note      Chief Complaint   Patient presents with    Check-Up     S/p aaa       HISTORY OF PRESENT ILLNESS:                The patient is a 81 y.o. male who returns for follow-up evaluation of previous endovascular aneurysm repair with femoral-femoral bypass. He is complaining of trouble with ambulating stairs. His legs feel weak. He feels his gait is not as good as it used to be. He exercises at the gym 3x per week. His goal is to get back to walking 4 miles per day. He is in remission from prostate cancer and is undergoing hormone shots. Past Medical History:        Diagnosis Date    Arthritis     CAD (coronary artery disease) 2002    follows with Dr. Matthew Barboza yearly    Carotid artery stenosis     Dr. Haritha Morrison per patient    GERD (gastroesophageal reflux disease)     Hyperlipidemia     Hypertension     Prostate cancer ([address]     Type 2 diabetes mellitus without complication [facility])      Past Surgical History:        Procedure Laterality Date    ABDOMINAL AORTIC ANEURYSM REPAIR, ENDOVASCULAR N/A 02/14/2022    ENDOVASCULAR REPAIR OF ABDOMINAL AORTIC ANEURYSM, WITH A FEMORAL TO FEMORAL BYPASS performed by Clarnce Osler, MD at [facility]  2002    DIAGNOSTIC CARDIAC CATH LAB PROCEDURE  2002    ERCP      HERNIA REPAIR  2003    LITHOTRIPSY      OTHER SURGICAL HISTORY      pancreatic stent x2    [facility] LAPAROSCOPY SURG CHOLECYSTECTOMY N/A 09/06/2018    CHOLECYSTECTOMY LAPAROSCOPIC ROBOTIC performed by Radha Villalta MD at [address] N/A 4/25/2022    ULTRASOUND GUIDED TRANSPERINEAL IMPLANT OF SPACEOAR performed by Maynor Negro MD at [place] [address] ENDOSCOPY       Current Medications:   Prior to Admission medications    Medication Sig Start Date End Date Taking?  Authorizing Provider   losartan (COZAAR) 100 MG tablet TAKE 1 TABLET EVERY MORNING 3/13/23  Yes Rigo Velázquez DO   glimepiride (AMARYL) 2 MG tablet TAKE 1 TABLET EVERY MORNING 2/20/23  Yes Rigo Velázquez DO   simvastatin (ZOCOR) 40 MG tablet TAKE 1 TABLET EVERY NIGHT 2/20/23  Yes Rigo Velázquez DO   empagliflozin (JARDIANCE) 10 MG tablet TAKE 1 TABLET EVERY DAY 2/20/23  Yes Rigo Velázquez DO   metFORMIN (GLUCOPHAGE) 1000 MG tablet TAKE 1 TABLET TWICE DAILY WITH MEALS 2/20/23  Yes Rigo Velázquez DO   blood glucose test strips (FREESTYLE LITE) strip 1 each by Other route daily As needed. 1/5/23  Yes Rigo Velázquez DO   isosorbide mononitrate (IMDUR) 30 MG extended release tablet TAKE 1 TABLET EVERY DAY 10/26/22  Yes Rigo Velázquez DO   fenofibrate micronized (LOFIBRA) 134 MG capsule TAKE 1 CAPSULE EVERY DAY 10/26/22  Yes Rigo Velázquez DO   metoprolol succinate (TOPROL XL) 50 MG extended release tablet TAKE 1 TABLET AT BEDTIME 9/21/22  Yes Rigo Velázquez DO   Phenazopyridine HCl (AZO TABS PO) Take by mouth   Yes Historical Provider, MD   tamsulosin (FLOMAX) 0.4 MG capsule 0.4 mg 8/1/22  Yes Historical Provider, MD   oxybutynin (DITROPAN) 5 MG tablet  6/20/22  Yes Historical Provider, MD   FreeStyle Lancets MISC use to check blood sugar daily 3/3/22  Yes Jason Verdin PA-C   Multiple Vitamins-Minerals (CENTRUM SILVER 50+MEN PO) Take by mouth daily    Yes Historical Provider, MD   lipase-protease-amylase (CREON) 04106-33010 units delayed release capsule Take 1 capsule by mouth 3 times daily (with meals) 8/10/18  Yes Jennyfer Stephens III, MD   aspirin 81 MG EC tablet Take 81 mg by mouth daily    Yes Historical Provider, MD     Allergies:  Pcn [penicillins], Grass extracts [gramineae pollens], and Percocet [oxycodone-acetaminophen]    Social History     Socioeconomic History    Marital status:       Spouse name: Not on file    Number of children: Not on file    Years of education: Not on file    Highest education level: Not on file   Occupational History    Not on file   Tobacco Use    Smoking status: Former     Packs/day: 0.25     Years: 0.00     Pack years: 0.00     Types: Cigars, Cigarettes     Start date: 1960     Quit date: 1998     Years since quittin.2    Smokeless tobacco: Never   Vaping Use    Vaping Use: Never used   Substance and Sexual Activity    Alcohol use: No    Drug use: No    Sexual activity: Never   Other Topics Concern    Not on file   Social History Narrative    Not on file     Social Determinants of Health     Financial Resource Strain: Low Risk     Difficulty of Paying Living Expenses: Not hard at all   Food Insecurity: No Food Insecurity    Worried About Running Out of Food in the Last Year: Never true    Ran Out of Food in the Last Year: Never true   Transportation Needs: Not on file   Physical Activity: Sufficiently Active    Days of Exercise per Week: 3 days    Minutes of Exercise per Session: 60 min   Stress: Not on file   Social Connections: Not on file   Intimate Partner Violence: Not on file   Housing Stability: Not on file        Family History   Problem Relation Age of Onset    Heart Disease Mother     High Blood Pressure Mother     Heart Disease Father     High Blood Pressure Father     Cancer Maternal Grandfather        REVIEW OF SYSTEMS (New symptoms):    Eyes:      Blurred vision:  No [x]/Yes []               Diplopia:   No [x]/Yes []               Vision loss:       No [x]/Yes []   Ears, nose, throat:             Hearing loss:    No [x]/Yes []      Vertigo:   No [x]/Yes []                       Swallowing problem:  No [x]/Yes []               Nose bleeds:   No [x]/Yes []      Voice hoarseness:  No [x]/Yes []  Respiratory:             Cough:   No [x]/Yes []      Pleuritic chest pain:  No [x]/Yes []                        Dyspnea:   No [x]/Yes []      Wheezing:   No [x]/Yes []  Cardiovascular:             Angina:   No [x]/Yes []      Palpitations:   No [x]/Yes [] Claudication:    No [x]/Yes []      Leg swelling:   No [x]/Yes []  Gastrointestinal:             Nausea or vomiting:  No [x]/Yes []               Abdominal pain:  No [x]/Yes []                     Intestinal bleeding: No [x]/Yes []  Musculoskeletal:             Leg pain:   No [x]/Yes []      Back pain:   No [x]/Yes []                    Weakness:   No [x]/Yes []  Neurologic:             Numbness:   No [x]/Yes []      Paralysis:   No [x]/Yes []                       Headaches:   No [x]/Yes []  Hematologic, lymphatic:   Anemia:   No [x]/Yes []              Bleeding or bruising:  No [x]/Yes []              Fevers or chills: No [x]/Yes []  Endocrine:             Temp intolerance:   No [x]/Yes []                       Polydipsia, polyuria:  No [x]/Yes []  Skin:              Rash:    No [x]/Yes []      Ulcers:   No [x]/Yes []              Abnorm pigment: No [x]/Yes []  :              Frequency/urgency:  No []/Yes [x]      Hematuria:    No [x]/Yes []                      Incontinence:    No []/Yes [x]    PHYSICAL EXAM:  There were no vitals filed for this visit.   General Appearance: alert and oriented to person, place and time, in no acute distress, well developed and well- nourished  Neurologic: no cranial nerve deficit, speech normal  Head: normocephalic and atraumatic  Eyes: extraocular eye movements intact, conjunctivae normal  ENT: external ear and ear canal normal bilaterally, nose without deformity, no carotid bruits  Pulmonary/Chest: normal air movement, no respiratory distress  Cardiovascular: normal rate, regular rhythm, no murmur  Abdomen: non-distended, no masses  Musculoskeletal: no joint deformity or tenderness  Extremities: no leg edema bilaterally  Skin: warm and dry, no rash or erythema    PULSE EXAM      Right      Left   Brachial     Radial 3 3   Femoral     Popliteal     Dorsalis Pedis 3 3   Posterior Tibial     (3=normal, 2=diminished, 1=barely palpable, 4=widened)    RADIOLOGY: [facility] today    Problem List Items Addressed This Visit          Circulatory    AAA (abdominal aortic aneurysm) without rupture - Primary    Relevant Orders    US ABDOMINAL AORTA LIMITED       I reviewed with the patient that from my standpoint he can continue with his normal activities. I will plan to see him again in 12 months at which time we will repeat an ultrasound of the aorta. I asked him to call sooner if he is unable to get back to his goal of walking 4 miles per day because of leg weakness. If he is unable to walk as far as he would like, we can do arterial studies to assess his blood flow to his legs. Pt seen and plan reviewed with Dr. Lindsey Acevedo, APRN - CNP     No follow-ups on file.

## 2023-03-14 NOTE — PROGRESS NOTES
[facility] Heart & Vascular Lab - [facility]    This is a pre read worksheet - prior to official physician interpretation    Rosana Helms  [birth year]  Date of study: 3/14/23    Indication for study:  AAA  Study : Aortic Ultrasound    Diameter Aorta:     Proximal:   cm     Mid:   cm     Distal:  4.7 x 4.7 cm     Right iliac: 1.2 x 1.3 cm     Left iliac: 1.4 x 1.4 cm    Additional comments: limited d/t bowel gas.  No obvious leaks visualized        LAST STUDY   3/23/2022 CTA  5.4 cm

## 2023-03-27 ENCOUNTER — HOSPITAL ENCOUNTER (OUTPATIENT)
Dept: CARDIAC REHAB | Age: 83
Discharge: HOME | End: 2023-03-27

## 2023-03-27 RX ORDER — FENOFIBRATE 134 MG/1
CAPSULE ORAL
Qty: 90 CAPSULE | Refills: 1 | Status: SHIPPED | OUTPATIENT
Start: 2023-03-27

## 2023-03-27 RX ORDER — ISOSORBIDE MONONITRATE 30 MG/1
TABLET, EXTENDED RELEASE ORAL
Qty: 90 TABLET | Refills: 1 | Status: SHIPPED | OUTPATIENT
Start: 2023-03-27

## 2023-04-06 PROCEDURE — 9900000038 HC CARDIAC REHAB PHASE 3 - MONTHLY

## 2023-04-24 ENCOUNTER — HOSPITAL ENCOUNTER (OUTPATIENT)
Dept: CARDIAC REHAB | Age: 83
Discharge: HOME | End: 2023-04-24

## 2023-05-04 PROCEDURE — 9900000038 HC CARDIAC REHAB PHASE 3 - MONTHLY

## 2023-05-10 ENCOUNTER — OFFICE VISIT (OUTPATIENT)
Dept: PRIMARY CARE CLINIC | Age: 83
End: 2023-05-10

## 2023-05-10 VITALS
BODY MASS INDEX: 23.65 KG/M2 | WEIGHT: 151 LBS | DIASTOLIC BLOOD PRESSURE: 64 MMHG | OXYGEN SATURATION: 94 % | HEART RATE: 61 BPM | SYSTOLIC BLOOD PRESSURE: 118 MMHG | TEMPERATURE: 97.4 F

## 2023-05-10 DIAGNOSIS — E11.9 TYPE 2 DIABETES MELLITUS WITHOUT COMPLICATION, WITHOUT LONG-TERM CURRENT USE OF INSULIN (HCC): ICD-10-CM

## 2023-05-10 DIAGNOSIS — E11.9 TYPE 2 DIABETES MELLITUS WITHOUT COMPLICATION, WITHOUT LONG-TERM CURRENT USE OF INSULIN (HCC): Primary | ICD-10-CM

## 2023-05-10 DIAGNOSIS — I25.119 CORONARY ARTERY DISEASE INVOLVING NATIVE CORONARY ARTERY OF NATIVE HEART WITH ANGINA PECTORIS (HCC): ICD-10-CM

## 2023-05-10 DIAGNOSIS — R53.83 OTHER FATIGUE: ICD-10-CM

## 2023-05-10 DIAGNOSIS — N18.31 STAGE 3A CHRONIC KIDNEY DISEASE (HCC): ICD-10-CM

## 2023-05-10 LAB
ALBUMIN SERPL-MCNC: 3.6 G/DL (ref 3.5–5.2)
ALP SERPL-CCNC: 33 U/L (ref 40–129)
ALT SERPL-CCNC: 17 U/L (ref 0–40)
ANION GAP SERPL CALCULATED.3IONS-SCNC: 13 MMOL/L (ref 7–16)
AST SERPL-CCNC: 26 U/L (ref 0–39)
BILIRUB SERPL-MCNC: 0.4 MG/DL (ref 0–1.2)
BUN SERPL-MCNC: 38 MG/DL (ref 6–23)
CALCIUM SERPL-MCNC: 8.9 MG/DL (ref 8.6–10.2)
CHLORIDE SERPL-SCNC: 110 MMOL/L (ref 98–107)
CHOLESTEROL, TOTAL: 135 MG/DL (ref 0–199)
CO2 SERPL-SCNC: 20 MMOL/L (ref 22–29)
CREAT SERPL-MCNC: 1.3 MG/DL (ref 0.7–1.2)
ERYTHROCYTE [DISTWIDTH] IN BLOOD BY AUTOMATED COUNT: 14.4 FL (ref 11.5–15)
GLUCOSE SERPL-MCNC: 120 MG/DL (ref 74–99)
HBA1C MFR BLD: 5.5 % (ref 4–5.6)
HCT VFR BLD AUTO: 32.3 % (ref 37–54)
HDLC SERPL-MCNC: 34 MG/DL
HGB BLD-MCNC: 9.5 G/DL (ref 12.5–16.5)
LDLC SERPL CALC-MCNC: 78 MG/DL (ref 0–99)
MCH RBC QN AUTO: 31.4 PG (ref 26–35)
MCHC RBC AUTO-ENTMCNC: 29.4 % (ref 32–34.5)
MCV RBC AUTO: 106.6 FL (ref 80–99.9)
PLATELET # BLD AUTO: 237 E9/L (ref 130–450)
PMV BLD AUTO: 9.3 FL (ref 7–12)
POTASSIUM SERPL-SCNC: 5 MMOL/L (ref 3.5–5)
PROT SERPL-MCNC: 6 G/DL (ref 6.4–8.3)
RBC # BLD AUTO: 3.03 E12/L (ref 3.8–5.8)
SODIUM SERPL-SCNC: 143 MMOL/L (ref 132–146)
TRIGL SERPL-MCNC: 117 MG/DL (ref 0–149)
VLDLC SERPL CALC-MCNC: 23 MG/DL
WBC # BLD: 3.6 E9/L (ref 4.5–11.5)

## 2023-05-10 RX ORDER — CYANOCOBALAMIN 1000 UG/ML
1000 INJECTION, SOLUTION INTRAMUSCULAR; SUBCUTANEOUS ONCE
Status: COMPLETED | OUTPATIENT
Start: 2023-05-10 | End: 2023-05-10

## 2023-05-10 RX ORDER — OXYBUTYNIN CHLORIDE 10 MG/1
10 TABLET, EXTENDED RELEASE ORAL NIGHTLY
COMMUNITY
Start: 2023-02-06

## 2023-05-10 RX ADMIN — CYANOCOBALAMIN 1000 MCG: 1000 INJECTION, SOLUTION INTRAMUSCULAR; SUBCUTANEOUS at 08:45

## 2023-05-10 SDOH — ECONOMIC STABILITY: FOOD INSECURITY: WITHIN THE PAST 12 MONTHS, YOU WORRIED THAT YOUR FOOD WOULD RUN OUT BEFORE YOU GOT MONEY TO BUY MORE.: NEVER TRUE

## 2023-05-10 SDOH — ECONOMIC STABILITY: HOUSING INSECURITY
IN THE LAST 12 MONTHS, WAS THERE A TIME WHEN YOU DID NOT HAVE A STEADY PLACE TO SLEEP OR SLEPT IN A SHELTER (INCLUDING NOW)?: NO

## 2023-05-10 SDOH — ECONOMIC STABILITY: FOOD INSECURITY: WITHIN THE PAST 12 MONTHS, THE FOOD YOU BOUGHT JUST DIDN'T LAST AND YOU DIDN'T HAVE MONEY TO GET MORE.: NEVER TRUE

## 2023-05-10 SDOH — ECONOMIC STABILITY: INCOME INSECURITY: HOW HARD IS IT FOR YOU TO PAY FOR THE VERY BASICS LIKE FOOD, HOUSING, MEDICAL CARE, AND HEATING?: NOT HARD AT ALL

## 2023-05-10 ASSESSMENT — ENCOUNTER SYMPTOMS
DIARRHEA: 0
CONSTIPATION: 0
BLOOD IN STOOL: 0
VISUAL CHANGE: 0
SHORTNESS OF BREATH: 0
BLURRED VISION: 0

## 2023-05-10 NOTE — PROGRESS NOTES
Tj Lund, a male of 81 y.o. came to the office 5/10/2023. Patient Active Problem List   Diagnosis    Hypertension    Hyperlipidemia    CAD (coronary artery disease)    Esophageal stricture    GERD (gastroesophageal reflux disease)    Type 2 diabetes mellitus without complication, without long-term current use of insulin (HCC)    Gallstone pancreatitis    Pancreatitis, unspecified pancreatitis type    Hepatic steatosis    Bilateral renal cysts    Cystic disease of liver    History of ERCP    Pancreatic duct stones    Prostate cancer ([facility] [address]    AAA (abdominal aortic aneurysm) without rupture (HCC)    S/P AAA repair using bifurcation graft    S/P femoral-femoral bypass surgery    Chronic renal disease, stage III ([facility] [address] [826276]    Type 2 diabetes mellitus with chronic kidney disease          Diabetes  He presents for his follow-up diabetic visit. He has type 2 diabetes mellitus. His disease course has been stable. Associated symptoms include fatigue and weight loss. Pertinent negatives for diabetes include no blurred vision, no chest pain, no foot paresthesias, no foot ulcerations, no polydipsia, no polyuria and no visual change. Symptoms are stable. Current diabetic treatment includes oral agent (triple therapy). He is compliant with treatment all of the time. His weight is decreasing steadily. He is following a generally healthy diet. He participates in exercise three times a week. Coronary Artery Disease  Presents for follow-up visit. Pertinent negatives include no chest pain, leg swelling, palpitations, shortness of breath or weight gain. The symptoms have been stable. Compliance with diet is good. Compliance with exercise is good (goes to cardiac rehab 3 days a wk. ). Compliance with medications is good. Allergies   Allergen Reactions    Pcn [Penicillins] Swelling     Hives, swelling when 10year old.     Grass Extracts [Gramineae Pollens]      roses    Percocet [Oxycodone-Acetaminophen] Other (See

## 2023-05-12 ENCOUNTER — TELEPHONE (OUTPATIENT)
Dept: PRIMARY CARE CLINIC | Age: 83
End: 2023-05-12

## 2023-05-12 RX ORDER — FERROUS SULFATE 325(65) MG
325 TABLET ORAL 2 TIMES DAILY
Qty: 60 TABLET | Refills: 5 | Status: SHIPPED | OUTPATIENT
Start: 2023-05-12

## 2023-05-12 NOTE — TELEPHONE ENCOUNTER
----- Message from Gaby Mcdermott sent at 5/12/2023  1:20 PM EDT -----  Subject: Refill Request    QUESTIONS  Name of Medication? Other - iron pills  Patient-reported dosage and instructions? Pt unknown  How many days do you have left? 0  Preferred Pharmacy? [address] [facility] phone number (if available)? [phone]  Additional Information for Provider? Pt discussed prescribing iron pills   with PCP Melania over the phone today after recent bloodwork results. ---------------------------------------------------------------------------  --------------  Justice LATHAM  What is the best way for the office to contact you? OK to leave message on   voicemail  Preferred Call Back Phone Number? [phone]  ---------------------------------------------------------------------------  --------------  SCRIPT ANSWERS  Relationship to Patient?  Self

## 2023-05-12 NOTE — TELEPHONE ENCOUNTER
Spoke with patient A1c much improved down to 5.8 so we will stop his glimepiride. LDL 78 so increase simvastatin to 80 mg daily. Hemoglobin 9.8 with microcytic, hypochromic indices. He denies any blood loss in the stool such as black stools or blood. Therefore we will start him on ferrous sulfate 324 mg twice a day. Dosing  instructions given. Follow-up in 1 month.

## 2023-05-22 ENCOUNTER — HOSPITAL ENCOUNTER (OUTPATIENT)
Dept: CARDIAC REHAB | Age: 83
Discharge: HOME | End: 2023-05-22

## 2023-05-25 DIAGNOSIS — I25.119 CORONARY ARTERY DISEASE INVOLVING NATIVE CORONARY ARTERY OF NATIVE HEART WITH ANGINA PECTORIS (HCC): Chronic | ICD-10-CM

## 2023-05-25 DIAGNOSIS — I10 HYPERTENSION, UNSPECIFIED TYPE: ICD-10-CM

## 2023-05-25 RX ORDER — METOPROLOL SUCCINATE 50 MG/1
TABLET, EXTENDED RELEASE ORAL
Qty: 90 TABLET | Refills: 1 | Status: SHIPPED | OUTPATIENT
Start: 2023-05-25

## 2023-06-03 PROCEDURE — 9900000038 HC CARDIAC REHAB PHASE 3 - MONTHLY

## 2023-06-06 LAB — DIABETIC RETINOPATHY: NEGATIVE

## 2023-06-15 PROBLEM — D50.9 IRON DEFICIENCY ANEMIA: Status: ACTIVE | Noted: 2023-01-01

## 2023-06-26 ENCOUNTER — HOSPITAL ENCOUNTER (OUTPATIENT)
Dept: CARDIAC REHAB | Age: 83
Discharge: HOME | End: 2023-06-26

## 2023-07-17 PROBLEM — M62.82 RHABDOMYOLYSIS: Status: ACTIVE | Noted: 2023-01-01

## 2023-07-17 NOTE — ED NOTES
Radiology Procedure Waiver   Name: Rosa Linsdey  : [id number]  MRN: [id number]    Date:  23    Time: 6:58 PM EDT    Benefits of immediately proceeding with Radiology exam(s) without pre-testing outweigh the risks or are not indicated as specified below and therefore the following is/are being waived:    [] Pregnancy test   [] Patients LMP on-time and regular.   [] Patient had Tubal Ligation or has other Contraception Device. [] Patient  is Menopausal or Premenarcheal.    [] Patient had Full or Partial Hysterectomy. [] Protocol for Iodine allergy    [] MRI Questionnaire     [x] BUN/Creatinine   [] Patient age w/no hx of renal dysfunction. [] Patient on Dialysis. [] Recent Normal Labs.   Electronically signed by Nya Tsai MD on 23 at 6:58 PM EDT          EMERGENT     Nya Tsai MD  [id number]

## 2023-07-17 NOTE — ED PROVIDER NOTES
Disease Father     High Blood Pressure Father     Cancer Maternal Grandfather         SOCIAL HISTORY       Social History     Tobacco Use    Smoking status: Former     Packs/day: 0.25     Years: 0.00     Pack years: 0.00     Types: Cigars, Cigarettes     Start date: 1960     Quit date: 1998     Years since quittin.5    Smokeless tobacco: Never   Vaping Use    Vaping Use: Never used   Substance Use Topics    Alcohol use: Not Currently    Drug use: No       SCREENINGS        [place] Coma Scale  Eye Opening: Spontaneous  Best Verbal Response: Oriented  Best Motor Response: Obeys commands  Brad Coma Scale Score: 15                CIWA Assessment  BP: (!) 132/54  Pulse: 88           PHYSICAL EXAM  1 or more Elements     ED Triage Vitals [23 1850]   BP Temp Temp src Pulse Resp SpO2 Height Weight - Scale   81/69 -- -- 92 -- 90 % 5' 7\" (1.702 m) 145 lb (65.8 kg)           Constitutional/General: Alert and oriented x4, frail appearing  Head: Normocephalic and atraumatic  Eyes: PERRL, EOMI, conjunctiva normal, sclera non icteric  ENT:  Oropharynx clear, handling secretions, no trismus, no asymmetry of the posterior oropharynx or uvular edema  Neck: No midline or paraspinal cervical spine tenderness noted on exam supple, full ROM, no stridor, no meningeal signs  Respiratory: Lungs clear to auscultation bilaterally, no wheezes, rales, or rhonchi. Not in respiratory distress  Cardiovascular:  Regular rate. Regular rhythm. No murmurs, no gallops, no rubs. 2+ distal pulses. Equal extremity pulses. Chest: No chest wall tenderness  GI:  Abdomen Soft, Non tender, Non distended. No rebound, guarding, or rigidity. No pulsatile masses. Musculoskeletal: No midline or paraspinal tenderness noted on exam.  Moves all extremities x 4. Warm and well perfused, no clubbing, no cyanosis, no edema. Capillary refill <3 seconds  Integument: There is bruising noted around the patient's body.   Neurologic: GCS 15, no focal over to CAT scan for further evaluation. CBC shows leukocytosis of 13,500. CMP shows bicarb of 13, blood glucose of 124, BUN of 40, creatinine of 2.7 up from baseline of 1.3. Elevated liver enzymes , , alk phos is normal at 54. Total bilirubin is 1.4 and only mildly elevated. Lactic acid elevated at 9.0. Initial troponin is 86. Repeat troponin delta is -18 after several 6 L of fluid. Patient was reevaluated multiple times in the emergency room. His blood pressure has improved with fluid bolus administration. Patient is afebrile with no acute findings of infection seen on CAT scan imaging. Patient's low blood pressure more likely due to dehydration and rhabdomyolysis rather than infectious pathology. Case was discussed with Dr. [place], internal medicine, who agrees to admit the patient. At the time of admission, the patient demonstrated good understanding of his condition, no questions, was hemodynamically stable. CONSULTS: (Who and What was discussed)  IP CONSULT TO INTERNAL MEDICINE        I am the Primary Clinician of Record. FINAL IMPRESSION      1. Traumatic rhabdomyolysis, initial encounter ([address])    2. Fall, initial encounter    3. KYLE (acute kidney injury) ([address])    4. Lactic acidosis          DISPOSITION/PLAN     DISPOSITION Admitted 07/17/2023 10:33:20 PM      PATIENT REFERRED TO:  No follow-up provider specified. DISCHARGE MEDICATIONS:  New Prescriptions    No medications on file       DISCONTINUED MEDICATIONS:  Discontinued Medications    No medications on file              Patient was seen and evaluated by myself and my attending Cyn Dorsey DO. Assessment and Plan discussed with attending provider, please see attestation for final plan of care.      (Please note that portions of this note were completed with a voice recognition program.  Efforts were made to edit the dictations but occasionally words are mis-transcribed.)    Jenifer Stauffer MD (electronically

## 2023-07-18 NOTE — DEATH NOTES
Death Pronouncement Note  Patient's Name: Kathie Patel   Patient's YOB: 1940  MRN Number: [id number]    Admitting Provider: Nicolas Valentino DO  Attending Provider: Nicolas Valentino DO    Patient was examined and the following were absent: Pulses, Blood Pressure, and Respiratory effort    I declared the patient dead on  at [address] [place]    Preliminary Cause of Death: respiratory arrest    Electronically signed by Isabela Muhammad MD on 7/18/23 at 6:01 PM EDT

## 2023-07-18 NOTE — ED PROVIDER NOTES
PROCEDURE  7/18/23       Time: 7:50 AM EDT      CENTRAL LINE INSERTION  Risks, benefits and alternatives (for applicable procedures below) described. Performed By: Ova Nurse, MD.    Indication: centrally administered medications. Informed consent: Verbal consent obtained. The family and patient and counseled regarding the procedure in person, it's indications, risks, potential complications and alternatives and any questions were answered. Verbal consent was obtained. Procedure: After routine sterile preparation, local anesthesia obtained by infiltration using 1% Lidocaine without epinephrine. A right 3-Lumen 7F Central Venous Catheter was placed by internal jugular vein approach and secured by standard fashion. Ultrasound Guidance:   used. Number of Attempts: 1  Post-procedure Findings: A post procedural chest x-ray  was ordered and showed no evidence of pneumothorax and line in good position  Patient tolerated the procedure well.           Ousmane Agarwal MD  07/18/23 [id number]

## 2023-07-18 NOTE — CARE COORDINATION
Social Work/ Case Management Transition of Care Planning (1 Jey Meneses, [address]): Pt presented to the hospital with acute onset fall, moderate in severity and Rhabdomyolysis. Pt had altered mental status upon arrival to ED. Pt was found lying naked on bedroom floor for possibly over 48hrs. Pt is on 4/L NC. IV Vanc, IV Merrem q12, IV Solu-cortef q6, Levophed drip, bicarb drip. Nephrology following. Discharge plan unknown at this time. SW/CM to follow.      1 J[place], [place]  7/18/2023

## 2023-07-18 NOTE — CONSULTS
Nephrology Consult Note  Patient's Name: Cosmo Moreno  8:39 AM  7/18/2023      Reason for Consult:  Acute kidney injury  Requesting Physician:  Gema Wong DO    Chief Complaint:    Fall, altered mental status  History Obtained From:  relative( son and s/o ); EHR    History of Present Ilness:    Cosmo Moreno is a 81 y.o. male with a history of coronary artery disease s/p remote CABG, carotid stenosis, hyperlipidemia, type 2 diabetes mellitus and hypertension. He presented to ED with complaints of fall and altered mental status. According to patient's son they have visited his home on the day of admission and observed that he was on the floor with streak of blood around his nostrils. He wasa awake and responding to questions. They indicate that last contact with him may have been about 16 to 18 hours previously. Patient himself indicates that he may have lost his footing. He denies any dizziness or lightheadedness. No chest pain or shortness of breath. On arrival to ED patient was observed to be altered. Vital signs revealed patient to be in shock with systolic blood pressure in the 60s. Rest of his vitals showed temperature 98.3 respirations 18. Initial laboratory data showed TSH 5.23, high-sensitivity troponin 86, sodium 145, potassium 3.7, chloride 107, CO2 13, glucose 124, BUN 40, creatinine 2.7 (recent recent baseline 1.3-1.5) WBC 13.5 hemoglobin 13.9 lactic acid 9, and total CK 2649.;  ABG showed marked acidemia with a pH of 7.1. Chest x-ray showed no acute process,and CTA of A/P showed stable arterial endograft with no hydronephrosis  He received aggressive fluid resuscitation of about 4.5 L normal saline. Blood pressure still noted to be low hence Levophed was started. Renal is consulted for KYLE  Patient's been admitted to MICU for further management.       Past Surgical History:   Procedure Laterality Date    ABDOMINAL AORTIC ANEURYSM REPAIR, ENDOVASCULAR N/A 02/14/2022 tablet 4 mg, Q8H PRN   Or  ondansetron (ZOFRAN) injection 4 mg, Q6H PRN        Review of Systems:   Pertinent items are noted in HPI. Physical exam:  Vitals:    07/18/23 0813   BP: (!) 95/52   Pulse:    Resp:    Temp:    SpO2:            General: drowsy, arouses easily, follows simple commands; respond to simple questions  Eyes: PERRL. No sclera icterus. No conjunctival injection. ENT: No discharge. Pharynx clear. Neck: Trachea midline. Normal thyroid. Lungs: No accessory muscle use. No crackles. No wheezing. No rhonchi. CV: Regular rate. Regular rhythm. No murmur or rub. .   Abd: Non-tender. Non-distended. No masses. No organmegaly. Normal bowel sounds.; fullness in suprapubic area, no discrete mass   Skin: Warm and dry. No nodule on exposed extremities. No rash on exposed extremities. Ext: No cyanosis, clubbing, edema ; mottled toes, cool to touch  Neuro: drowsy, arouses easily and follows some simple commands; moving all extremities      Data:   Labs:  Lab Results   Component Value Date     07/17/2023     05/10/2023     01/05/2023    K 3.7 07/17/2023    K 5.0 05/10/2023    K 5.2 (H) 01/05/2023     07/17/2023    CO2 13 (L) 07/17/2023    CO2 20 (L) 05/10/2023    CO2 22 01/05/2023    CREATININE 2.7 (H) 07/17/2023    CREATININE 1.3 (H) 05/10/2023    CREATININE 1.5 (H) 01/05/2023    BUN 40 (H) 07/17/2023    BUN 38 (H) 05/10/2023    BUN 44 (H) 01/05/2023    GLUCOSE 124 (H) 07/17/2023    GLUCOSE 120 (H) 05/10/2023    GLUCOSE 107 (H) 01/05/2023    WBC 10.3 07/18/2023    WBC 13.5 (H) 07/17/2023    WBC 3.9 (L) 06/12/2023    HGB 9.9 (L) 07/18/2023    HGB 13.9 07/17/2023    HGB 10.7 (L) 06/12/2023    HCT 34.5 (L) 07/18/2023    HCT 45.9 07/17/2023    HCT 35.4 (L) 06/12/2023    .5 (H) 07/18/2023    PLT 78 (L) 07/18/2023         Imaging:  No results found. Assessment/Plans  1. KYLE stage II,multifactorial: rhabdo, shock with ATN, IV contrast nephrotoxicity, nonoliguric   Aggressive fluid

## 2023-07-18 NOTE — SIGNIFICANT EVENT
Attention was called to this patient for worsening mentation with hypoxia desaturating down to the 60s. Per RN, patient became to desaturate after taking a bite of Jell-O. He was actively suctioned with lots of Jell-O material suctioned out. However he continued to desaturate with worsening mentation. Patient was evaluated at the bedside and was started airVo with stat ABG, chest x-ray ordered. ABG showed worsening hypoxemia and no significant chest noted on chest x-ray. I had an extensive discussion with family with RN present. Family at this time decides to change CODE STATUS to DNR CC as he preferred to make patient comfortable. CODE STATUS was therefore changed to DNR CC and comfort orders placed.     Electronically signed by Marilynn Marsh MD on 7/18/2023 at 5:53 PM

## 2023-07-18 NOTE — PROGRESS NOTES
4 Eyes Skin Assessment     NAME:  Herrera Smyth  YOB: 1940  MEDICAL RECORD NUMBER:  [id number]    The patient is being assessed for  Admission    I agree that at least one RN has performed a thorough Head to Toe Skin Assessment on the patient. ALL assessment sites listed below have been assessed. Areas assessed by both nurses:    Head, Face, Ears, Shoulders, Back, Chest, Arms, Elbows, Hands, Sacrum. Buttock, Coccyx, Ischium, Legs. Feet and Heels, and Under Medical Devices         Does the Patient have a Wound?  No noted wound(s)       Cameron Prevention initiated by RN: Yes  Wound Care Orders initiated by RN: No    Pressure Injury (Stage 3,4, Unstageable, DTI, NWPT, and Complex wounds) if present, place Wound referral order by RN under : No    New Ostomies, if present place, Ostomy referral order under : No     Nurse 1 eSignature: Electronically signed by Antonio Hawkins RN on 7/18/23 at 9:27 AM EDT    **SHARE this note so that the co-signing nurse can place an eSignature**    Nurse 2 eSignature: Electronically signed by Renee Peña RN on 7/18/23 at 9:38 AM EDT

## 2023-07-18 NOTE — H&P
[address], [address] [place] [address]                              HISTORY AND PHYSICAL    PATIENT NAME: Karol Dominguez                     :        1940  MED REC NO:   [id number]                            ROOM:       [id number]  ACCOUNT NO:   [de-identified]                           ADMIT DATE: 2023  PROVIDER:     Sanda Closs, DO    PRIMARY CARE PHYSICIAN:  Gray Amador DO    CHIEF COMPLAINT:  Altered mental status. HISTORY OF PRESENT ILLNESS:  The patient is an 81-year-old   male who presented to the emergency room from home with altered mental  status. He was found lying in his bedroom naked. He had altered mental  status. Diagnostic evaluation in the emergency room revealed  significant abnormalities, elevated BUN and creatinine, elevated CK,  elevated lactic acid, and hypotension. The patient was admitted. PAST MEDICAL HISTORY:  Prostate cancer, aortic aneurysm, pancreatitis,  pancreatic stone, diabetes mellitus type 2, hyperlipidemia,  hypertension, arthritis, coronary artery disease, cerebrovascular  disease, and GERD. PAST SURGICAL HISTORY:  Abdominal aortic aneurysm repair endovascular  with femoral to femoral bypass, cholecystectomy, coronary artery bypass  graft, ERCP, hernia repair, lithotripsy, pancreatic stent x2, prostate  surgery, implant of SpaceOAR, and tonsillectomy. MEDICATIONS:  Prior to admission; Amaryl, Toprol XL, iron, Imdur,  fenofibrate, losartan, Zocor, Jardiance, Glucophage, phenazopyridine,  Flomax, multivitamin, Creon, and aspirin. REVIEW OF SYSTEMS:  Remarkable for above-stated chief complaint. ALLERGIES:  To PENICILLIN, GRASS EXTRACTS, PERCOCET. SOCIAL HISTORY:  Quit tobacco.  No alcohol. PHYSICAL EXAMINATION:  GENERAL APPEARANCE:  Reveals an 81-year-old  male who is  lethargic, but responsive to verbal stimuli.   He is seen in the  emergency

## 2023-07-18 NOTE — PROGRESS NOTES
Patient admitted to MICU with the following belongings:  Darius Chiang. The following belongings admitted with the patient, None, were sent home with the patient's family.

## 2023-07-19 ENCOUNTER — TELEPHONE (OUTPATIENT)
Dept: PRIMARY CARE CLINIC | Age: 83
End: 2023-07-19

## 2023-07-19 LAB — PROCALCITONIN SERPL-MCNC: >100 NG/ML (ref 0–0.08)

## 2023-07-19 NOTE — TELEPHONE ENCOUNTER
Pt's son,John, called today. Wanted to be sure CE knew pt passed away yesterday.     Adell Sicard  [phone]

## 2023-07-20 LAB
MICROORGANISM SPEC CULT: ABNORMAL
SPECIMEN DESCRIPTION: ABNORMAL

## 2023-07-20 NOTE — PROGRESS NOTES
Physician Progress Note      PATIENT:               Eugene Baltazar  CSN #:                  [id number]  :                       1940  ADMIT DATE:       2023 6:45 PM  [address] [facility] DATE:        2023 8:52 PM  RESPONDING  PROVIDER #: Juliana [facility] DO          QUERY TEXT:    Pt admitted with traumatic rhabdo and has shock documented. If possible,   please document in progress notes and discharge summary further specificity   regarding the type of shock: The medical record reflects the following:  Risk Factors: Rhabdo, Shock  Clinical Indicators: per H&P  Acute rhabdomyolysis shock; per ICU  requiring   pressors- levophed , continue to wean as tolerated , has received over 4 L IVF   in ED; per renal Shock,  septic versus cardiogenic  Treatment: IVF, Pressor support, transition to comfort measures    Thank you  Danielle Coburn BSN, RN, CCDS  Clinical Documentation Improvement  Options provided:  -- Cardiogenic Shock  -- Septic Shock  -- Hypovolemic Shock  -- Other - I will add my own diagnosis  -- Disagree - Not applicable / Not valid  -- Disagree - Clinically unable to determine / Unknown  -- Refer to Clinical Documentation Reviewer    PROVIDER RESPONSE TEXT:    This patient is in septic shock.     Query created by: Danielle Coburn on  9:09 PM      Electronically signed by:  Juliana [facility] DO 2023 5:21 PM

## 2023-07-21 LAB
MICROORGANISM SPEC CULT: ABNORMAL
MICROORGANISM SPEC CULT: ABNORMAL
MICROORGANISM/AGENT SPEC: ABNORMAL
SERVICE CMNT-IMP: ABNORMAL
SERVICE CMNT-IMP: ABNORMAL
SPECIMEN DESCRIPTION: ABNORMAL
SPECIMEN DESCRIPTION: ABNORMAL

## 2023-07-25 NOTE — PROGRESS NOTES
Physician Progress Note      PATIENT:               Roxane Rice  CSN #:                  [id number]  :                       1940  ADMIT DATE:       2023 6:45 PM  [facility] DATE:        2023 8:52 PM  RESPONDING  PROVIDER #: Cyn Dorsey DO          QUERY TEXT:    Pt admitted with rhabdo. Pt noted to have septic shock. If possible, please   document in progress notes and discharge summary the present on admission   status of Sepsis and septic shock:     The medical record reflects the following:  Risk Factors: AMS, Shock  Clinical Indicators: Per H&P Acute rhabdo shock; Lab findings on admission WBC   13.5  14.2  Lactic 9.0--6.8; VS findings ----, 92, 81/69, 90%RA 98.3, 88, 26,   102/5; per prior query response This patient is in septic shock  Treatment: Pressor support, tranfser to ICU, IV vanc, transition to comfort   measures    Thank you  Mendy LEHMAN, RN, CCDS  Clinical Documentation Improvement  Options provided:  -- Yes, Sepsis was present at the time of the order to admit to the hospital  -- No, sepsis was not present on admission and developed during the inpatient   stay  -- Other - I will add my own diagnosis  -- Disagree - Not applicable / Not valid  -- Disagree - Clinically unable to determine / Unknown  -- Refer to Clinical Documentation Reviewer    PROVIDER RESPONSE TEXT:    Yes, sepsis was present at the time of the order to admit to the hospital.    Query created by: Mendy Queen on 9485 59:56 AM      Electronically signed by:  Cyn Dorsey DO 2023 7:32 AM

## 2023-07-31 ENCOUNTER — HOSPITAL ENCOUNTER (OUTPATIENT)
Dept: CARDIAC REHAB | Age: 83
Discharge: HOME | End: 2023-07-31

## (undated) DEVICE — BEACON TIP TORCON NB ADVANTAGE CATHETER: Brand: BEACON TIP TORCON NB

## (undated) DEVICE — Device

## (undated) DEVICE — LABEL MED 4 IN SURG PANEL W/ PEN STRL

## (undated) DEVICE — SOLUTION IV IRRIG WATER 1000ML POUR BRL 2F7114

## (undated) DEVICE — INFLATION DEVICE KIT: Brand: ENCORE™ 26 ADVANTAGE KIT

## (undated) DEVICE — MEDIA CONTRAST RX ISOVUE-300 61% 30ML VIALS

## (undated) DEVICE — OBTURATOR ROBOTIC DIA8MM BLDELSS ENDOSCP DISP DA VINCI SI

## (undated) DEVICE — FLEXOR, CHECK-FLO, INTRODUCER SET: Brand: FLEXOR

## (undated) DEVICE — ADVANCE, 35LP LOW PROFILE PTA BALLOON DILATATION CATHETER: Brand: ADVANCE

## (undated) DEVICE — TISSUE RETRIEVAL SYSTEM: Brand: INZII RETRIEVAL SYSTEM

## (undated) DEVICE — NEEDLE HYPO 22GA L1.5IN BLK POLYPR HUB S STL REG BVL STR

## (undated) DEVICE — GOWN,SIRUS,FABRNF,XL,20/CS: Brand: MEDLINE

## (undated) DEVICE — SET SURG INSTR ART III

## (undated) DEVICE — DOUBLE BASIN SET: Brand: MEDLINE INDUSTRIES, INC.

## (undated) DEVICE — [HIGH FLOW INSUFFLATOR,  DO NOT USE IF PACKAGE IS DAMAGED,  KEEP DRY,  KEEP AWAY FROM SUNLIGHT,  PROTECT FROM HEAT AND RADIOACTIVE SOURCES.]: Brand: PNEUMOSURE

## (undated) DEVICE — SODIUM CHL 09% SOL EXCEL

## (undated) DEVICE — CHECK-FLO PERFORMER INTRODUCER: Brand: PERFORMER

## (undated) DEVICE — CUP MEDICINE ST

## (undated) DEVICE — PATIENT RETURN ELECTRODE, SINGLE-USE, CONTACT QUALITY MONITORING, ADULT, WITH 9FT CORD, FOR PATIENTS WEIGING OVER 33LBS. (15KG): Brand: MEGADYNE

## (undated) DEVICE — KIT SURG PREP POVIDONE IOD PRESATURATED PAINT WET FOR UNIV

## (undated) DEVICE — DRAPE, MULTI FENESTRATED, HYBRID OR, STE: Brand: MEDLINE

## (undated) DEVICE — MARKER,SKIN,WI/RULER AND LABELS: Brand: MEDLINE

## (undated) DEVICE — 3M™ STERI-DRAPE™ INCISE DRAPE 1050 (60CM X 45CM): Brand: STERI-DRAPE™

## (undated) DEVICE — ENDOVASCULAR: Brand: MEDLINE INDUSTRIES, INC.

## (undated) DEVICE — PERCLOSE PROGLIDE™ SUTURE-MEDIATED CLOSURE SYSTEM: Brand: PERCLOSE PROGLIDE™

## (undated) DEVICE — CHLORAPREP 26ML ORANGE

## (undated) DEVICE — RADIFOCUS GLIDEWIRE ADVANTAGE GUIDEWIRE: Brand: GLIDEWIRE ADVANTAGE

## (undated) DEVICE — GOWN,AURORA,NONREINF,RAGLAN,L,STERILE: Brand: MEDLINE

## (undated) DEVICE — SHEATH INTRO 16FR L33CM OD6.1MM ID5.3MM HYDRPHLC KINK

## (undated) DEVICE — RADIFOCUS GLIDEWIRE: Brand: GLIDEWIRE

## (undated) DEVICE — TORCON NB ADVANTAGE CATHETER: Brand: TORCON NB

## (undated) DEVICE — KIT MED IMAG CNTRST AGNT W/ IOPAMIDOL REUSE

## (undated) DEVICE — ATRIEVE™ VASCULAR SNARE KIT 7F: Brand: ATRIEVE™ VASCULAR SNARE KIT

## (undated) DEVICE — CATHETER ANGIO MOD HK 2 0.035 IN 5 FRX80 CM PERFORMA

## (undated) DEVICE — SET SURG BASIN MAYO REUSABLE

## (undated) DEVICE — TFE COATED AMPLATZ ULTRA STIFF WIRE GUIDE - CATHETER EXCHANGE: Brand: AMPLATZ

## (undated) DEVICE — SKIN AFFIX SURG ADHESIVE 72/CS 0.55ML: Brand: MEDLINE

## (undated) DEVICE — CATHETER PTCA BLLN L4CM INFL 10-37MM CATH L90CM MOLDING

## (undated) DEVICE — 18GA (1.3MM OD: 1.0MM ID) X 7CM INTRODUCER18GA X 7CM NEEDLENEEDLE: Brand: INTRODUCER NEEDLEINTRODUCER NEEDLE

## (undated) DEVICE — ROBOTIC DAVINCI 8.5MM 30DEG SCOPE

## (undated) DEVICE — GLOVE ORANGE PI 8   MSG9080

## (undated) DEVICE — FIXED CORE WIRE GUIDE SAFE-T-J, CURVED: Brand: COOK

## (undated) DEVICE — SHEATH INTRO 12FR L33CM OD4.7MM ID4MM HYDRPHLC KINK

## (undated) DEVICE — Z INACTIVE USE 2641839 CLIP INT M L POLYMER LOK LIG HEM O LOK

## (undated) DEVICE — TUBING ANGIO L48IN POLYUR HI PRSS 1200PSI AIRLESS ROT ADPT

## (undated) DEVICE — PACK PROCEDURE SURG GEN CUST

## (undated) DEVICE — CATHETER ANGIO AD 5FR L65CM DIA0.046IN GWIRE 0.035IN BERN

## (undated) DEVICE — KIT,ANTI FOG,W/SPONGE & FLUID,SOFT PACK: Brand: MEDLINE

## (undated) DEVICE — MEDIA CONTRAST ISOVUE 370 100ML

## (undated) DEVICE — BASIN NEURO

## (undated) DEVICE — SURGICAL PROCEDURE PACK VASC MAJ CUST

## (undated) DEVICE — TOWEL,OR,DSP,ST,BLUE,STD,6/PK,12PK/CS: Brand: MEDLINE

## (undated) DEVICE — CORD,CAUTERY,BIPOLAR,STERILE: Brand: MEDLINE

## (undated) DEVICE — GAUZE,SPONGE,4"X4",8PLY,STRL,LF,10/TRAY: Brand: MEDLINE

## (undated) DEVICE — KIT DRP 4 ARM ACC DISP DA VINCI SI ENDOWRIST

## (undated) DEVICE — SEAL CANN DIA8.5-13MM ENDOWRIST DA VINCI SI

## (undated) DEVICE — MICROPUNCTURE INTRODUCER SET SILHOUETTE TRANSITIONLESS PUSH-PLUS DESIGN - STIFFENED CANNULA WITH STAINLESS STEEL WIRE GUIDE: Brand: MICROPUNCTURE

## (undated) DEVICE — NEEDLE INSUF L120MM DIA2MM DISP FOR PNEUMOPERI ENDOPATH

## (undated) DEVICE — INTENDED FOR TISSUE SEPARATION, AND OTHER PROCEDURES THAT REQUIRE A SHARP SURGICAL BLADE TO PUNCTURE OR CUT.: Brand: BARD-PARKER ® STAINLESS STEEL BLADES

## (undated) DEVICE — TROCAR ENDOSCP L100MM DIA5MM BLDELSS STBL SL THRD OPT VW

## (undated) DEVICE — LOOP VES W25MM THK1MM MAXI RED SIL FLD REPELLENT 100 PER

## (undated) DEVICE — BLADE CLIPPER GEN PURP NS

## (undated) DEVICE — CODA, LP BALLOON CATHETER: Brand: CODA

## (undated) DEVICE — BENTSON WIRE GUIDE 20CM DISTAL FLEXIBILITY WITH SOFTENED TIP: Brand: BENTSON

## (undated) DEVICE — DRAPE EQUIP BANDED BG 36X28 IN W/ROUNDED CORNER SNAPKOVER

## (undated) DEVICE — Z DISCONTINUED PER MEDLINE USE 2741942 DRESSING AQUACEL 6 IN ALG W9XL15CM SIL CVR WTRPRF VIR BACT BARR ANTIMIC

## (undated) DEVICE — DRAPE,LAP,CHOLE,W/TROUGHS,STERILE: Brand: MEDLINE

## (undated) DEVICE — RADIFOCUS GLIDECATH: Brand: GLIDECATH

## (undated) DEVICE — GARMENT,MEDLINE,DVT,INT,CALF,MED, GEN2: Brand: MEDLINE

## (undated) DEVICE — KIT MFLD ISOLATN NACL CNTRST PRT TBNG SPIK W/ PRSS TRNSDUC

## (undated) DEVICE — COVER PRB L11.9CM TAPR L3.8X61CM TRNSPAR SFT PLIABLE

## (undated) DEVICE — KIT HND CTRL 3 W STPCOCK ROT END 54IN PREM HI PRSS TBNG AT

## (undated) DEVICE — SYRINGE MED 20ML STD CLR PLAS LUERLOCK TIP N CTRL DISP

## (undated) DEVICE — SET INSTR ART 1

## (undated) DEVICE — DRAPE THER FLUID WARMING 66X44 IN FLAT SLUSH DBL DISC ORS

## (undated) DEVICE — TIP COVER ACCESSORY

## (undated) DEVICE — CATHETER VASC DIAG MOD PERIPH W/O HYDRPHLC COAT AD

## (undated) DEVICE — GOWN,SIRUS,FABRNF,2XL,18/CS: Brand: MEDLINE

## (undated) DEVICE — SYRINGE, LUER LOCK, 10ML: Brand: MEDLINE

## (undated) DEVICE — DRAPE,REIN 53X77,STERILE: Brand: MEDLINE

## (undated) DEVICE — GLOVE SURG SZ 75 L12IN FNGR THK79MIL GRN LTX FREE

## (undated) DEVICE — GOWN,SIRUS,FABRNF,L,20/CS: Brand: MEDLINE

## (undated) DEVICE — GEL US 20GM NONIRRITATING OVERWRAPPED FILE PCH TRNSMIT

## (undated) DEVICE — MEDIUM-LARGE CLIP APPLIER: Brand: ENDOWRIST;DAVINCI SI